# Patient Record
Sex: MALE | Race: WHITE | NOT HISPANIC OR LATINO | Employment: OTHER | URBAN - METROPOLITAN AREA
[De-identification: names, ages, dates, MRNs, and addresses within clinical notes are randomized per-mention and may not be internally consistent; named-entity substitution may affect disease eponyms.]

---

## 2017-01-28 DIAGNOSIS — I10 BENIGN ESSENTIAL HTN: ICD-10-CM

## 2017-01-30 RX ORDER — LOSARTAN POTASSIUM 50 MG/1
TABLET ORAL
Qty: 90 TABLET | Refills: 3 | Status: SHIPPED | OUTPATIENT
Start: 2017-01-30 | End: 2017-05-16 | Stop reason: SDUPTHER

## 2017-02-21 ENCOUNTER — OFFICE VISIT (OUTPATIENT)
Dept: INTERNAL MEDICINE | Facility: CLINIC | Age: 82
End: 2017-02-21
Payer: MEDICARE

## 2017-02-21 ENCOUNTER — TELEPHONE (OUTPATIENT)
Dept: PRIMARY CARE CLINIC | Facility: CLINIC | Age: 82
End: 2017-02-21

## 2017-02-21 VITALS
SYSTOLIC BLOOD PRESSURE: 141 MMHG | TEMPERATURE: 98 F | HEIGHT: 71 IN | BODY MASS INDEX: 31.02 KG/M2 | OXYGEN SATURATION: 95 % | WEIGHT: 221.56 LBS | DIASTOLIC BLOOD PRESSURE: 81 MMHG | HEART RATE: 83 BPM

## 2017-02-21 DIAGNOSIS — I50.22 CHRONIC SYSTOLIC CHF (CONGESTIVE HEART FAILURE): ICD-10-CM

## 2017-02-21 DIAGNOSIS — J45.40 ASTHMA IN ADULT, MODERATE PERSISTENT, UNCOMPLICATED: Primary | ICD-10-CM

## 2017-02-21 PROCEDURE — 99214 OFFICE O/P EST MOD 30 MIN: CPT | Mod: PBBFAC | Performed by: NURSE PRACTITIONER

## 2017-02-21 PROCEDURE — 99213 OFFICE O/P EST LOW 20 MIN: CPT | Mod: S$PBB,,, | Performed by: NURSE PRACTITIONER

## 2017-02-21 PROCEDURE — 99999 PR PBB SHADOW E&M-EST. PATIENT-LVL IV: CPT | Mod: PBBFAC,,, | Performed by: NURSE PRACTITIONER

## 2017-02-21 RX ORDER — LORATADINE 10 MG/1
10 TABLET ORAL DAILY
COMMUNITY
End: 2018-05-03

## 2017-02-21 NOTE — TELEPHONE ENCOUNTER
""Patient is wheezing and his grandson has the flu, his wife called. He has asthma, as of 4-5 years ago, from acid reflux. Yesterday evening he is coughing, with wheezing at night.  Taking Qvar inhaler, proair, Claritin, No Mucinex, no temperature, Offered her an appt time for her  on today.  "

## 2017-02-21 NOTE — MR AVS SNAPSHOT
[facility]  [address]  [facility] [address]  Phone: [phone]  Fax: [phone]                  Dennis Rees   2017 5:00 PM   Office Visit    Description:  Male : 1935   Provider:  Clarisse Metcalf NP   Department:  [facility]           Reason for Visit     Nasal Congestion           Diagnoses this Visit        Comments    Asthma in adult, moderate persistent, uncomplicated    -  Primary     Chronic systolic CHF (congestive heart failure)                To Do List           Future Appointments        Provider Department Dept Phone    3/16/2017 9:30 AM ROSINA Collado MD [place] [facility]mology [phone]      Goals (5 Years of Data)              Today    16    Blood Pressure < 140/90   141/81  161/101  173/85    HDL > 40           LDL CHOLESTEROL < 130             [facility]     [facility] On Call Nurse Care Line - 24/7 Assistance  Registered nurses in the [facility] On Call Center provide clinical advisement, health education, appointment booking, and other advisory services.  Call for this free service at [phone].             Medications           Message regarding Medications     Verify the changes and/or additions to your medication regime listed below are the same as discussed with your clinician today.  If any of these changes or additions are incorrect, please notify your healthcare provider.        STOP taking these medications     predniSONE (DELTASONE) 20 MG tablet Take 3 tabs daily for 3 days, then 2 tabs daily for 3 days, then 1 tabs daily for 3 days    nystatin (MYCOSTATIN) powder Apply topically 3 (three) times daily.           Verify that the below list of medications is an accurate representation of the medications you are currently taking.  If none reported, the list may be blank. If incorrect, please contact your healthcare provider. Carry this list with you in case of emergency.          "  Current Medications     albuterol 90 mcg/actuation inhaler Inhale 2 puffs into the lungs every 6 (six) hours as needed for Wheezing.    aspirin (ECOTRIN) 81 MG EC tablet Take 81 mg by mouth once daily.     atorvastatin (LIPITOR) 40 MG tablet Take 1 tablet (40 mg total) by mouth once daily.    beclomethasone (QVAR) 40 mcg/actuation Aero Inhale 2 puffs into the lungs 2 (two) times daily.    carvedilol (COREG) 3.125 MG tablet Take 1 tablet (3.125 mg total) by mouth 2 (two) times daily with meals.    diphenhydrAMINE (BENADRYL) 25 mg capsule Take 25 mg by mouth every 6 (six) hours as needed.    dutasteride (AVODART) 0.5 mg capsule Take 1 capsule (0.5 mg total) by mouth once daily.    fluticasone (FLONASE) 50 mcg/actuation nasal spray 1 spray by Each Nare route once daily.    FLUZONE HIGH-DOSE 2016-17, PF, 180 mcg/0.5 mL Syrg Use as directed    loratadine (CLARITIN) 10 mg tablet Take 10 mg by mouth once daily.    losartan (COZAAR) 50 MG tablet Take 1 tablet (50 mg total) by mouth once daily.    omeprazole (PRILOSEC) 40 MG capsule Take 1 capsule (40 mg total) by mouth 2 (two) times daily before meals.           Clinical Reference Information           Your Vitals Were     BP Pulse Temp Height Weight SpO2    141/81 83 97.8 °F (36.6 °C) (Oral) 5' 11" (1.803 m) 100.5 kg (221 lb 9 oz) 95%    BMI                30.9 kg/m2          Blood Pressure          Most Recent Value    BP  (!)  141/81      Allergies as of 2/21/2017     Pcn [Penicillins]    Sulfa (Sulfonamide Antibiotics)      Immunizations Administered on Date of Encounter - 2/21/2017     None      Language Assistance Services     ATTENTION: Language assistance services are available, free of charge. Please call [phone].      ATENCIÓN: Si habla milaisaiah, tiene a small disposición servicios gratuitos de asistencia lingüística. Llame al [phone].     KOKO Ý: N?u b?n nói Ti?ng Vi?t, có các d?ch v? h? tr? ngôn ng? mi?n phí dành cho b?n. G?i s? [phone].      "    [facility] complies with applicable Federal civil rights laws and does not discriminate on the basis of race, color, national origin, age, disability, or sex.

## 2017-02-21 NOTE — TELEPHONE ENCOUNTER
"Aliya,  Thanks. I see he has an appt today at 5PM  Agree the best thing is same day UC  hsarri    ====  "Patient is wheezing and his grandson has the flu, his wife called. He has asthma, as of 4-5 years ago, from acid reflux. Yesterday evening he is coughing, with wheezing at night. Taking Qvar inhaler, proair, Claritin, No Mucinex, no temperature, Offered her an appt time for her  on today.      "

## 2017-02-22 NOTE — PROGRESS NOTES
Subjective:       Patient ID: Dennis Rees is a 82 y.o. male.    Chief Complaint: Nasal Congestion (cough)  Mr. Rees presents today for cold like symptoms and night time wheezing.  He is accompanied by his wife, who gave most of the history.  Mrs. Rees reports that Mr. Rees has had a cold for 3 day and yesterday his cough got quite bad.  Today, he is doing much better, but they came in to be checked out.   URI    This is a new problem. The current episode started in the past 7 days. The problem has been gradually improving. There has been no fever. Associated symptoms include congestion, coughing, rhinorrhea, a sore throat and wheezing. Pertinent negatives include no abdominal pain, chest pain, diarrhea, dysuria, ear pain, headaches, joint swelling, nausea, plugged ear sensation, rash, sinus pain, sneezing, swollen glands or vomiting. He has tried antihistamine (restarted QVAR Saturday, once daily, increased to BID on Monday. ) for the symptoms. The treatment provided significant relief.     Review of Systems   Constitutional: Negative for fever.   HENT: Positive for congestion, rhinorrhea and sore throat. Negative for ear pain and sneezing.    Eyes: Negative for visual disturbance.   Respiratory: Positive for cough and wheezing.    Cardiovascular: Negative for chest pain.   Gastrointestinal: Negative for abdominal pain, diarrhea, nausea and vomiting.   Genitourinary: Negative for dysuria.   Musculoskeletal: Negative for myalgias.   Skin: Negative for rash.   Neurological: Negative for headaches.   Psychiatric/Behavioral: Negative for confusion.       Objective:      Physical Exam   Constitutional: He appears well-developed and well-nourished. No distress.   HENT:   Head: Atraumatic.   Right Ear: Tympanic membrane and ear canal normal.   Left Ear: Tympanic membrane and ear canal normal.   Nose: Mucosal edema and rhinorrhea present. No sinus tenderness. Right sinus exhibits no maxillary sinus tenderness and no  frontal sinus tenderness. Left sinus exhibits no maxillary sinus tenderness and no frontal sinus tenderness.   Mouth/Throat: Oropharyngeal exudate present. No posterior oropharyngeal edema, posterior oropharyngeal erythema or tonsillar abscesses.   Eyes: No scleral icterus.   Neck: Neck supple. No hepatojugular reflux and no JVD present.   Cardiovascular: Normal rate, regular rhythm and normal heart sounds.    Abdominal: Soft. Bowel sounds are normal. He exhibits no distension and no ascites.   Musculoskeletal:   Slow, shuffling gait.  Mild, non-pitting dependent edema   Lymphadenopathy:     He has no cervical adenopathy.   Neurological: He is alert.   Skin: Skin is warm and dry. He is not diaphoretic.   Psychiatric: His behavior is normal.   Nursing note and vitals reviewed.      Assessment:       1. Asthma in adult, moderate persistent, uncomplicated    2. Chronic systolic CHF (congestive heart failure)        Plan:   1. Asthma in adult, moderate persistent, uncomplicated  - Continue QVAR BUD, flonase, and zyrtec for at least 1 week     2. Chronic systolic CHF (congestive heart failure)  - No evidence of acute exaccerbation      Pt has been given instructions populated from [employer] database and has verbalized understanding of the after visit summary and information contained wherein.    Follow up with a primary care provider. May go to ER for acute shortness of breath, lightheadedness, fever, or any other emergent complaints or changes in condition.

## 2017-03-30 ENCOUNTER — OFFICE VISIT (OUTPATIENT)
Dept: OPHTHALMOLOGY | Facility: CLINIC | Age: 82
End: 2017-03-30
Payer: MEDICARE

## 2017-03-30 DIAGNOSIS — H34.8110 CENTRAL RETINAL VEIN OCCLUSION, RIGHT EYE, WITH MACULAR EDEMA: Primary | ICD-10-CM

## 2017-03-30 DIAGNOSIS — H35.033 HYPERTENSIVE RETINOPATHY OF BOTH EYES: ICD-10-CM

## 2017-03-30 DIAGNOSIS — H35.351 CYSTOID MACULAR EDEMA, RIGHT EYE: ICD-10-CM

## 2017-03-30 PROCEDURE — 92226 PR SPECIAL EYE EXAM, SUBSEQUENT: CPT | Mod: 50,S$PBB,, | Performed by: OPHTHALMOLOGY

## 2017-03-30 PROCEDURE — 99999 PR PBB SHADOW E&M-EST. PATIENT-LVL III: CPT | Mod: PBBFAC,,, | Performed by: OPHTHALMOLOGY

## 2017-03-30 PROCEDURE — 92014 COMPRE OPH EXAM EST PT 1/>: CPT | Mod: S$PBB,,, | Performed by: OPHTHALMOLOGY

## 2017-03-30 PROCEDURE — 99213 OFFICE O/P EST LOW 20 MIN: CPT | Mod: PBBFAC,PO | Performed by: OPHTHALMOLOGY

## 2017-03-30 PROCEDURE — 92226 PR SPECIAL EYE EXAM, SUBSEQUENT: CPT | Mod: 50,PBBFAC,PO | Performed by: OPHTHALMOLOGY

## 2017-03-30 PROCEDURE — 92134 CPTRZ OPH DX IMG PST SGM RTA: CPT | Mod: PBBFAC,PO | Performed by: OPHTHALMOLOGY

## 2017-03-30 NOTE — PROGRESS NOTES
Prior FP - OD - swollen ONH with heme  OS - ONH normal - with small cup    OCT - CME OD with VMT - minimal  OS No ME      A/P    1. CRVO OD with CME  VMT component  S/p Avastin OD x 11    12/15 - increase fluid at 8 weeks - resumed 5-6 week tx    8/16 - no CME, try observation  11/16 only small CME - continue to watch    2.. AION OD  HTN with disc at risk  May get some vision improvement as disc swelling subsides, but pt aware of poor prognosis      3. HTN Ret OU  BP control    4. NS OU  Pt to see Dr. Baez for MRx update, if NI, then send for CE      12 months OCT

## 2017-03-30 NOTE — MR AVS SNAPSHOT
[place] [facility]   [facility]  [place] [address]  Phone: [phone]  Fax: [phone]                  Dennis Rees   3/30/2017 10:30 AM   Office Visit    Description:  Male : 1935   Provider:  ROSINA Collado MD   Department:  [place] [facility]           Reason for Visit     Concerns About Ocular Health           Diagnoses this Visit        Comments    Central retinal vein occlusion, right eye, with macular edema    -  Primary     Cystoid macular edema, right eye         Hypertensive retinopathy of both eyes                To Do List           Goals (5 Years of Data)              16    Blood Pressure < 140/90   141/81  141/81  141/81    HDL > 40           LDL CHOLESTEROL < 130             Follow-Up and Disposition     Return in about 1 year (around 3/30/2018).      [facility] On Call     [facility] On Call Nurse Care Line -  Assistance  Unless otherwise directed by your provider, please contact [facility], our nurse care line that is available for  assistance.     Registered nurses in the [facility] On Call Center provide: appointment scheduling, clinical advisement, health education, and other advisory services.  Call: [phone] (toll free)               Medications           Message regarding Medications     Verify the changes and/or additions to your medication regime listed below are the same as discussed with your clinician today.  If any of these changes or additions are incorrect, please notify your healthcare provider.             Verify that the below list of medications is an accurate representation of the medications you are currently taking.  If none reported, the list may be blank. If incorrect, please contact your healthcare provider. Carry this list with you in case of emergency.           Current Medications     albuterol 90 mcg/actuation inhaler Inhale 2 puffs into the lungs every 6 (six) hours as needed  for Wheezing.    aspirin (ECOTRIN) 81 MG EC tablet Take 81 mg by mouth once daily.     atorvastatin (LIPITOR) 40 MG tablet Take 1 tablet (40 mg total) by mouth once daily.    beclomethasone (QVAR) 40 mcg/actuation Aero Inhale 2 puffs into the lungs 2 (two) times daily.    carvedilol (COREG) 3.125 MG tablet Take 1 tablet (3.125 mg total) by mouth 2 (two) times daily with meals.    dutasteride (AVODART) 0.5 mg capsule Take 1 capsule (0.5 mg total) by mouth once daily.    fluticasone (FLONASE) 50 mcg/actuation nasal spray 1 spray by Each Nare route once daily.    FLUZONE HIGH-DOSE 2016-17, PF, 180 mcg/0.5 mL Syrg Use as directed    loratadine (CLARITIN) 10 mg tablet Take 10 mg by mouth once daily.    losartan (COZAAR) 50 MG tablet Take 1 tablet (50 mg total) by mouth once daily.    omeprazole (PRILOSEC) 40 MG capsule Take 1 capsule (40 mg total) by mouth 2 (two) times daily before meals.    diphenhydrAMINE (BENADRYL) 25 mg capsule Take 25 mg by mouth every 6 (six) hours as needed.           Clinical Reference Information           Allergies as of 3/30/2017     Pcn [Penicillins]    Sulfa (Sulfonamide Antibiotics)      Immunizations Administered on Date of Encounter - 3/30/2017     None      Orders Placed During Today's Visit      Normal Orders This Visit    Posterior Segment OCT Retina-Both eyes       Language Assistance Services     ATTENTION: Language assistance services are available, free of charge. Please call [phone].      ATENCIÓN: Si habla milaañol, tiene a small disposición servicios gratuitos de asistencia lingüística. Llame al [phone].     [facility] Ý: N?u b?n nói Ti?ng Vi?t, có các d?ch v? h? tr? ngôn ng? mi?n phí dành cho b?n. G?i s? [phone].         [facility] complies with applicable Federal civil rights laws and does not discriminate on the basis of race, color, national origin, age, disability, or sex.

## 2017-03-31 ENCOUNTER — PATIENT MESSAGE (OUTPATIENT)
Dept: RESEARCH | Facility: HOSPITAL | Age: 82
End: 2017-03-31

## 2017-04-19 ENCOUNTER — OFFICE VISIT (OUTPATIENT)
Dept: OPTOMETRY | Facility: CLINIC | Age: 82
End: 2017-04-19
Payer: MEDICARE

## 2017-04-19 DIAGNOSIS — H35.351 CYSTOID MACULAR EDEMA, RIGHT EYE: ICD-10-CM

## 2017-04-19 DIAGNOSIS — H52.203 HYPEROPIA WITH ASTIGMATISM, BILATERAL: ICD-10-CM

## 2017-04-19 DIAGNOSIS — H52.4 PRESBYOPIA OU: ICD-10-CM

## 2017-04-19 DIAGNOSIS — H52.03 HYPEROPIA WITH ASTIGMATISM, BILATERAL: ICD-10-CM

## 2017-04-19 DIAGNOSIS — H25.13 NUCLEAR SCLEROSIS, BILATERAL: Primary | ICD-10-CM

## 2017-04-19 DIAGNOSIS — H34.8112 CENTRAL RETINAL VEIN OCCLUSION, RIGHT EYE: ICD-10-CM

## 2017-04-19 DIAGNOSIS — H53.8 BLURRED VISION, BILATERAL: ICD-10-CM

## 2017-04-19 PROCEDURE — 92014 COMPRE OPH EXAM EST PT 1/>: CPT | Mod: S$PBB,,, | Performed by: OPTOMETRIST

## 2017-04-19 PROCEDURE — 92015 DETERMINE REFRACTIVE STATE: CPT | Mod: ,,, | Performed by: OPTOMETRIST

## 2017-04-19 PROCEDURE — 99213 OFFICE O/P EST LOW 20 MIN: CPT | Mod: PBBFAC | Performed by: OPTOMETRIST

## 2017-04-19 PROCEDURE — 99999 PR PBB SHADOW E&M-EST. PATIENT-LVL III: CPT | Mod: PBBFAC,,, | Performed by: OPTOMETRIST

## 2017-04-19 NOTE — MR AVS SNAPSHOT
[facility]  [address]  [facility] [address]  Phone: [phone]  Fax: [phone]                  Dennis Rees   2017 2:30 PM   Office Visit    Description:  Male : 1935   Provider:  Jose Antonio Baez OD   Department:  [facility]           Reason for Visit     Concerns About Ocular Health                To Do List           Future Appointments        Provider Department Dept Phone    2017 3:00 PM Mandeep Ag MD [facility] - Cardiology [phone]    2017 11:20 AM Fiona Aguirre MD [facility] - Urology 4th Floor [phone]      Goals (5 Years of Data)              16    Blood Pressure < 140/90   141/81  141/81  141/81    HDL > 40           LDL CHOLESTEROL < 130             [facility]     [facility] On Call Nurse Care Line -  Assistance  Unless otherwise directed by your provider, please contact [facility], our nurse care line that is available for  assistance.     Registered nurses in the [facility] On Call Center provide: appointment scheduling, clinical advisement, health education, and other advisory services.  Call: [phone] (toll free)               Medications           Message regarding Medications     Verify the changes and/or additions to your medication regime listed below are the same as discussed with your clinician today.  If any of these changes or additions are incorrect, please notify your healthcare provider.             Verify that the below list of medications is an accurate representation of the medications you are currently taking.  If none reported, the list may be blank. If incorrect, please contact your healthcare provider. Carry this list with you in case of emergency.           Current Medications     albuterol 90 mcg/actuation inhaler Inhale 2 puffs into the lungs every 6 (six) hours as needed for Wheezing.    aspirin (ECOTRIN) 81 MG EC tablet Take 81 mg by mouth once  daily.     atorvastatin (LIPITOR) 40 MG tablet Take 1 tablet (40 mg total) by mouth once daily.    beclomethasone (QVAR) 40 mcg/actuation Aero Inhale 2 puffs into the lungs 2 (two) times daily.    carvedilol (COREG) 3.125 MG tablet Take 1 tablet (3.125 mg total) by mouth 2 (two) times daily with meals.    diphenhydrAMINE (BENADRYL) 25 mg capsule Take 25 mg by mouth every 6 (six) hours as needed.    dutasteride (AVODART) 0.5 mg capsule Take 1 capsule (0.5 mg total) by mouth once daily.    fluticasone (FLONASE) 50 mcg/actuation nasal spray 1 spray by Each Nare route once daily.    FLUZONE HIGH-DOSE 2016-17, PF, 180 mcg/0.5 mL Syrg Use as directed    loratadine (CLARITIN) 10 mg tablet Take 10 mg by mouth once daily.    losartan (COZAAR) 50 MG tablet Take 1 tablet (50 mg total) by mouth once daily.    omeprazole (PRILOSEC) 40 MG capsule Take 1 capsule (40 mg total) by mouth 2 (two) times daily before meals.           Clinical Reference Information           Allergies as of 4/19/2017     Pcn [Penicillins]    Sulfa (Sulfonamide Antibiotics)      Immunizations Administered on Date of Encounter - 4/19/2017     None      Instructions    History of central retinal vein occlusion in the right eye, with s/p multiple intravitreal injections in the right eye.  Followed by Dr. Collado in retina clinic.  Last visit with Dr. Collado on 03/31/2017.  Bilateral nuclear sclerosis.  No compelling need for cataract surgery in either eye at this time.  Monitor.  Hyperopia with astigmatism in each eye.  Minimal change (0.25 D) in refractive error in the right eye, and no change in refractive error in the left eye.  Presbyopia consistent with age.  New spectacle lens Rx issued for use as desired.  Follow up with Dr. Collado in retina clinic.   Recheck refraction and status of cataract development in one year - or prior if notes any problems or changes in [facility] in the interim.        Language Assistance Services     ATTENTION: Language  assistance services are available, free of charge. Please call [phone].      ATENCIÓN: Si habla shanna, tiene a small disposición servicios gratuitos de asistencia lingüística. Llame al [phone].     CHÚ Ý: N?u b?n nói Ti?ng Vi?t, có các d?ch v? h? tr? ngôn ng? mi?n phí dành cho b?n. G?i s? [phone].         [facility] complies with applicable Federal civil rights laws and does not discriminate on the basis of race, color, national origin, age, disability, or sex.

## 2017-04-19 NOTE — PROGRESS NOTES
"HPI     Concerns About Ocular Health    Additional comments: Eye exam            Comments   Approximate date of last eye examination: 11/30/2015  Name of last eye doctor seen:   Dr. Collado for follow up on central vein   occlusion in right eye, with macular edema.     Last visit with Dr. Baez in 11/2015  Wears glasses? yes     If yes, wears full-time or part-time?  Full time    Present glasses are bifocal / S V Distance / S V Reading:  progressives  Approximate age of present glasses:  3 yrs old   Got new glasses following last exam, or subsequently?:  no   Any problem with [facility] with glasses?  no  Wears CLs?:  no  Headaches? no  Eye pain/discomfort?  no                                                                                       Flashes?  no  Floaters?  no  Diplopia/Double vision?  no  Patient's Ocular History:         Any eye surgeries?  no         Any eye injury?  no         Any treatment for eye disease?  no  Family history of eye disease?  none  Significant patient medical history:         1. Diabetes?  No                   If yes, IDDM or NIDDM?  n/a   2. HBP?  yes              3. Other (describe):  Heart attack, high cholesterol, not sure   if he still has blockages   ! OTC eyedrops currently using:  no   ! Prescription eye meds currently using:  no   ! Any history of allergy/adverse reaction to any eye meds used   previously?  no    ! Any history of allergy/adverse reaction to eyedrops used during prior   eye exam(s)? no    ! Any history of allergy/adverse reaction to Novacaine or similar meds?   no   ! Any history of allergy/adverse reaction to Epinephrine or similar meds?   no    ! Patient okay with use of anesthetic eyedrops to check eye pressure? yes            ! Patient okay with use of eyedrops to dilate pupils today? yes    !  Allergies/Medications/Medical History/Family History reviewed today?    yes    PD =   61/57  Desired reading distance =  17.75"                                      "                               Last edited by Jose Antonio Baez, OD on 4/19/2017  3:30 PM. (History)            Assessment /Plan     For exam results, see Encounter Report.    1. Nuclear sclerosis, bilateral     2. Central retinal vein occlusion, right eye     3. Cystoid macular edema, right eye     4. Blurred vision, bilateral     5. Hyperopia with astigmatism, bilateral     6. Presbyopia OU                History of central retinal vein occlusion in the right eye, with s/p multiple intravitreal injections in the right eye.  Followed by Dr. Collado in retina clinic.  Last visit with Dr. Collado on 03/31/2017.  Bilateral nuclear sclerosis.  No compelling need for cataract surgery in either eye at this time.  Monitor.  Hyperopia with astigmatism in each eye.  Minimal change (0.25 D) in refractive error in the right eye, and no change in refractive error in the left eye.  Presbyopia consistent with age.  New spectacle lens Rx issued for use as desired.  Follow up with Dr. Collado in retina clinic.   Recheck refraction and status of cataract development in one year - or prior if notes any problems or changes in [facility] in the interim.

## 2017-04-19 NOTE — PATIENT INSTRUCTIONS
History of central retinal vein occlusion in the right eye, with s/p multiple intravitreal injections in the right eye.  Followed by Dr. Collado in retina clinic.  Last visit with Dr. Collado on 03/31/2017.  Bilateral nuclear sclerosis.  No compelling need for cataract surgery in either eye at this time.  Monitor.  Hyperopia with astigmatism in each eye.  Minimal change (0.25 D) in refractive error in the right eye, and no change in refractive error in the left eye.  Presbyopia consistent with age.  New spectacle lens Rx issued for use as desired.  Follow up with Dr. Collado in retina clinic.   Recheck refraction and status of cataract development in one year - or prior if notes any problems or changes in [facility] in the interim.

## 2017-04-21 DIAGNOSIS — N40.0 BENIGN NON-NODULAR PROSTATIC HYPERPLASIA WITHOUT LOWER URINARY TRACT SYMPTOMS: ICD-10-CM
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
## 2017-04-24 RX ORDER — DUTASTERIDE 0.5 MG/1
CAPSULE, LIQUID FILLED ORAL
Qty: 30 CAPSULE | Refills: 8 | Status: SHIPPED | OUTPATIENT
Start: 2017-04-24 | End: 2017-05-17 | Stop reason: SDUPTHER

## 2017-05-16 ENCOUNTER — OFFICE VISIT (OUTPATIENT)
Dept: CARDIOLOGY | Facility: CLINIC | Age: 82
End: 2017-05-16
Payer: MEDICARE

## 2017-05-16 VITALS
HEART RATE: 68 BPM | BODY MASS INDEX: 30.37 KG/M2 | WEIGHT: 216.94 LBS | SYSTOLIC BLOOD PRESSURE: 158 MMHG | DIASTOLIC BLOOD PRESSURE: 72 MMHG | HEIGHT: 71 IN

## 2017-05-16 DIAGNOSIS — I10 BENIGN ESSENTIAL HTN: ICD-10-CM

## 2017-05-16 DIAGNOSIS — J30.89 ENVIRONMENTAL AND SEASONAL ALLERGIES: ICD-10-CM

## 2017-05-16 DIAGNOSIS — N04.5 KIDNEY DISEASE (NEPHROTIC SYNDROME WITH MEMBRANOPROLIFERATIVE GLOMERULONEPHRITIS): ICD-10-CM

## 2017-05-16 DIAGNOSIS — E78.5 HYPERLIPIDEMIA, UNSPECIFIED HYPERLIPIDEMIA TYPE: ICD-10-CM

## 2017-05-16 DIAGNOSIS — I95.1 DELAYED ORTHOSTATIC HYPOTENSION: ICD-10-CM

## 2017-05-16 DIAGNOSIS — K21.9 GASTROESOPHAGEAL REFLUX DISEASE, ESOPHAGITIS PRESENCE NOT SPECIFIED: ICD-10-CM

## 2017-05-16 DIAGNOSIS — I25.10 CORONARY ARTERY DISEASE INVOLVING NATIVE CORONARY ARTERY OF NATIVE HEART WITHOUT ANGINA PECTORIS: Primary | ICD-10-CM

## 2017-05-16 DIAGNOSIS — J45.901 ASTHMA EXACERBATION: ICD-10-CM

## 2017-05-16 DIAGNOSIS — J45.40 ASTHMA IN ADULT, MODERATE PERSISTENT, UNCOMPLICATED: ICD-10-CM

## 2017-05-16 PROCEDURE — 99213 OFFICE O/P EST LOW 20 MIN: CPT | Mod: PBBFAC | Performed by: INTERNAL MEDICINE

## 2017-05-16 PROCEDURE — 99214 OFFICE O/P EST MOD 30 MIN: CPT | Mod: S$PBB,,, | Performed by: INTERNAL MEDICINE

## 2017-05-16 PROCEDURE — 99999 PR PBB SHADOW E&M-EST. PATIENT-LVL III: CPT | Mod: PBBFAC,,, | Performed by: INTERNAL MEDICINE

## 2017-05-16 RX ORDER — ATORVASTATIN CALCIUM 40 MG/1
40 TABLET, FILM COATED ORAL DAILY
Qty: 90 TABLET | Refills: 3 | Status: SHIPPED | OUTPATIENT
Start: 2017-05-16 | End: 2018-06-21 | Stop reason: SDUPTHER

## 2017-05-16 RX ORDER — OMEPRAZOLE 40 MG/1
40 CAPSULE, DELAYED RELEASE ORAL
Qty: 180 CAPSULE | Refills: 3 | Status: SHIPPED | OUTPATIENT
Start: 2017-05-16 | End: 2018-06-21 | Stop reason: SDUPTHER

## 2017-05-16 RX ORDER — FLUTICASONE PROPIONATE 50 MCG
1 SPRAY, SUSPENSION (ML) NASAL DAILY
Qty: 16 G | Refills: 5 | Status: SHIPPED | OUTPATIENT
Start: 2017-05-16 | End: 2018-06-21 | Stop reason: SDUPTHER

## 2017-05-16 RX ORDER — LOSARTAN POTASSIUM 50 MG/1
50 TABLET ORAL DAILY
Qty: 90 TABLET | Refills: 3 | Status: SHIPPED | OUTPATIENT
Start: 2017-05-16 | End: 2018-06-19 | Stop reason: SDUPTHER

## 2017-05-16 RX ORDER — CARVEDILOL 3.12 MG/1
TABLET ORAL
Qty: 180 TABLET | Refills: 3 | Status: SHIPPED | OUTPATIENT
Start: 2017-05-16 | End: 2018-06-21 | Stop reason: SDUPTHER

## 2017-05-16 RX ORDER — ALBUTEROL SULFATE 90 UG/1
2 AEROSOL, METERED RESPIRATORY (INHALATION) EVERY 6 HOURS PRN
Qty: 8.5 G | Refills: 12 | Status: SHIPPED | OUTPATIENT
Start: 2017-05-16 | End: 2018-06-01 | Stop reason: SDUPTHER

## 2017-05-16 NOTE — MR AVS SNAPSHOT
[facility] - Cardiology  [address]  [facility] [address]  Phone: [phone]                  Dennis Rees   2017 3:00 PM   Office Visit    Description:  Male : 1935   Provider:  Mandeep Ag MD   Department:  [facility]           Reason for Visit     Benign non-nodular prostatic hyperplasia without lower urina     Medication Refill     Chest Pain           Diagnoses this Visit        Comments    Coronary artery disease involving native coronary artery of native heart without angina pectoris    -  Primary     Asthma in adult, moderate persistent, uncomplicated         Asthma exacerbation         Hyperlipidemia, unspecified hyperlipidemia type         Delayed orthostatic hypotension         Environmental and seasonal allergies         Benign essential HTN         Gastroesophageal reflux disease, esophagitis presence not specified         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    Follow-up and Disposition History       These Medications        Disp Refills Start End    albuterol 90 mcg/actuation inhaler 8.5 g 12 2017     Inhale 2 puffs into the lungs every 6 (six) hours as needed for Wheezing. - Inhalation    Pharmacy: [facility] [address] [place] [address] [place] - [facility] [address] [facility] [address] Ph #: [phone]       atorvastatin (LIPITOR) 40 MG tablet 90  tablet 3 5/16/2017     Take 1 tablet (40 mg total) by mouth once daily. - Oral    Pharmacy: [address] [place] [address] [address] AT [facility] Ph #: [phone]       beclomethasone (QVAR) 40 mcg/actuation Aero 8.7 each 3 5/16/2017     Inhale 2 puffs into the lungs 2 (two) times daily. - Inhalation    Pharmacy: [facility] [employer] [address] [facility] [address] AT [facility] Ph #: [phone]       carvedilol (COREG) 3.125 MG tablet 180 tablet 3 5/16/2017     Take 1 tablet (3.125 mg total) by mouth 2 (two) times daily with meals.    Pharmacy: [facility] [employer] [address] [place] [address] [address] AT [facility] Ph #: [phone]       Notes to Pharmacy: This prescription was filled today(10/31/2016). Any refills authorized will be placed on file.    fluticasone (FLONASE) 50 mcg/actuation nasal spray 16 g 5 5/16/2017     1 spray by Each Nare route once daily. - Each Nare    Pharmacy: [facility] [employer] [address] [place] [facility] [address] AT [facility] Ph #: [phone]       losartan (COZAAR) 50 MG tablet 90 tablet 3 5/16/2017     Take 1 tablet (50 mg total) by mouth once daily. - Oral    Pharmacy: [facility] [employer] [address] [place] [address] [address] AT [facility] Ph #: [phone]       Notes to Pharmacy: This prescription was filled today(1/28/2017). Any refills authorized will be placed on file.    omeprazole (PRILOSEC) 40 MG capsule 180 capsule 3 5/16/2017 5/16/2018    Take 1 capsule (40 mg total) by mouth 2 (two) times daily before meals. - Oral    Pharmacy: [facility] [employer] [address] [address] [facility] [address] AT [facility] Ph #: [phone]       Notes to Pharmacy: This prescription was filled today. Any refills authorized will be placed on file.      [facility] On Call     [facility] Nurse Care Line - 24/7  Assistance  Unless otherwise directed by your provider, please contact [facility], our nurse care line that is available for 24/7 assistance.     Registered nurses in the [facility] provide: appointment scheduling, clinical advisement, health education, and other advisory services.  Call: [phone] (toll free)               Medications           Message regarding Medications     Verify the changes and/or additions to your medication regime listed below are the same as discussed with your clinician today.  If any of these changes or additions are incorrect, please notify your healthcare provider.        CHANGE how you are taking these medications     Start Taking Instead of    losartan (COZAAR) 50 MG tablet losartan (COZAAR) 50 MG tablet    Dosage:  Take 1 tablet (50 mg total) by mouth once daily. Dosage:  Take 1 tablet (50 mg total) by mouth once daily.    Reason for Change:  Reorder            Verify that the below list of medications is an accurate representation of the medications you are currently taking.  If none reported, the list may be blank. If incorrect, please contact your healthcare provider. Carry this list with you in case of emergency.           Current Medications     albuterol 90 mcg/actuation inhaler Inhale 2 puffs into the lungs every 6 (six) hours as needed for Wheezing.    aspirin (ECOTRIN) 81 MG EC tablet Take 81 mg by mouth once daily.     atorvastatin (LIPITOR) 40 MG tablet Take 1 tablet (40 mg total) by mouth once daily.    beclomethasone (QVAR) 40 mcg/actuation Aero Inhale 2 puffs into the lungs 2 (two) times daily.    carvedilol (COREG) 3.125 MG tablet Take 1 tablet (3.125 mg total) by mouth 2 (two) times daily with meals.    diphenhydrAMINE (BENADRYL) 25 mg capsule Take 25 mg by mouth every 6 (six) hours as needed.    dutasteride (AVODART) 0.5 mg capsule Take 1 capsule (0.5 mg total) by mouth once daily.    fluticasone (FLONASE) 50 mcg/actuation nasal spray 1 spray  "by Each Nare route once daily.    FLUZONE HIGH-DOSE 2016-17, PF, 180 mcg/0.5 mL Syrg Use as directed    loratadine (CLARITIN) 10 mg tablet Take 10 mg by mouth once daily.    losartan (COZAAR) 50 MG tablet Take 1 tablet (50 mg total) by mouth once daily.    omeprazole (PRILOSEC) 40 MG capsule Take 1 capsule (40 mg total) by mouth 2 (two) times daily before meals.           Clinical Reference Information           Your Vitals Were     BP Pulse Height Weight BMI    158/72 (BP Location: Left arm, Patient Position: Sitting, BP Method: Automatic) 68 5' 11" (1.803 m) 98.4 kg (216 lb 14.9 oz) 30.26 kg/m2      Blood Pressure          Most Recent Value    Right Arm BP - Sitting  143/62    Left Arm BP - Sitting  158/72    BP  (!)  158/72      Allergies as of 5/16/2017     Pcn [Penicillins]    Sulfa (Sulfonamide Antibiotics)      Immunizations Administered on Date of Encounter - 5/16/2017     None      Orders Placed During Today's Visit     Future Labs/Procedures Expected by Expires    CBC auto differential  5/16/2017 (Approximate) 5/16/2018    Comprehensive metabolic panel  5/16/2017 (Approximate) 5/16/2018    Lipid panel  5/16/2017 (Approximate) 5/16/2018      Language Assistance Services     ATTENTION: Language assistance services are available, free of charge. Please call [phone].      ATENCIÓN: Si habla español, tiene a small disposición servicios gratuitos de asistencia lingüística. Llame al [phone].     KOKO Ý: N?u b?n nói Ti?ng Vi?t, có các d?ch v? h? tr? ngôn ng? mi?n phí dành cho b?n. G?i s? [phone].         [facility] complies with applicable Federal civil rights laws and does not discriminate on the basis of race, color, national origin, age, disability, or sex.        "

## 2017-05-16 NOTE — PROGRESS NOTES
Subjective:    Patient ID:  Dennis Rees is a 82 y.o. male who presents for follow-up of CAD, hypertension and hyperlipidemia    HPI  82 year old male is followed with non critical CAD, hypertension , hyperlipidemia and orthostatic light headedness. His speech is slow and halting. He denies chest pain or increased SALDANA. He reports occasional palpitation. A prior Holter revealed frequent PACs. He has a slow gait is slow according  to his wife. 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 nose and back (NMSC), excised by     Squamous cell carcinoma 11/2014    right mid cheek, MOHS    Squamous cell carcinoma 11/2014    left mid cheek, MOHS     Current Outpatient Prescriptions   Medication Sig    albuterol 90 mcg/actuation inhaler Inhale 2 puffs into the lungs every 6 (six) hours as needed for Wheezing.    aspirin (ECOTRIN) 81 MG EC tablet Take 81 mg by mouth once daily.     atorvastatin (LIPITOR) 40 MG tablet Take 1 tablet (40 mg total) by mouth once daily.    beclomethasone (QVAR) 40 mcg/actuation Aero Inhale 2 puffs into the lungs 2 (two) times daily.    carvedilol (COREG) 3.125 MG tablet Take 1 tablet (3.125 mg total) by mouth 2 (two) times daily with meals.    diphenhydrAMINE (BENADRYL) 25 mg  capsule Take 25 mg by mouth every 6 (six) hours as needed.    dutasteride (AVODART) 0.5 mg capsule Take 1 capsule (0.5 mg total) by mouth once daily.    fluticasone (FLONASE) 50 mcg/actuation nasal spray 1 spray by Each Nare route once daily.    FLUZONE HIGH-DOSE 2016-17, PF, 180 mcg/0.5 mL Syrg Use as directed    loratadine (CLARITIN) 10 mg tablet Take 10 mg by mouth once daily.    losartan (COZAAR) 50 MG tablet Take 1 tablet (50 mg total) by mouth once daily.    omeprazole (PRILOSEC) 40 MG capsule Take 1 capsule (40 mg total) by mouth 2 (two) times daily before meals.     No current facility-administered medications for this visit.                Review of Systems   Constitution: Negative for decreased appetite, diaphoresis, fever, weakness, malaise/fatigue, weight gain and weight loss.   HENT: Negative for congestion, ear discharge, ear pain, headaches and nosebleeds.    Eyes: Negative for blurred vision, double vision and visual disturbance.   Cardiovascular: Positive for palpitations. Negative for chest pain, claudication, cyanosis, dyspnea on exertion, irregular heartbeat, leg swelling, near-syncope, orthopnea, paroxysmal nocturnal dyspnea and syncope.   Respiratory: Negative for cough, hemoptysis, shortness of breath, sleep disturbances due to breathing, snoring, sputum production and wheezing.    Endocrine: Negative for polydipsia, polyphagia and polyuria.   Hematologic/Lymphatic: Negative for adenopathy and bleeding problem. Does not bruise/bleed easily.   Skin: Negative for color change, nail changes, poor wound healing and rash.   Musculoskeletal: Negative for muscle cramps and muscle weakness.   Gastrointestinal: Negative for abdominal pain, anorexia, change in bowel habit, hematochezia, nausea and vomiting.   Genitourinary: Negative for dysuria, frequency and hematuria.   Neurological: Negative for brief paralysis, difficulty with concentration, excessive daytime sleepiness, dizziness, focal  "weakness, light-headedness, seizures and vertigo.        Bradykinesis   Psychiatric/Behavioral: Negative for altered mental status and depression.   Allergic/Immunologic: Negative for persistent infections.        Objective:BP (!) 158/72 (BP Location: Left arm, Patient Position: Sitting, BP Method: Automatic)  Pulse 68  Ht 5' 11" (1.803 m)  Wt 98.4 kg (216 lb 14.9 oz)  BMI 30.26 kg/m2    Physical Exam   Constitutional: He is oriented to person, place, and time. He appears well-developed and well-nourished.   HENT:   Head: Normocephalic.   Right Ear: External ear normal.   Left Ear: External ear normal.   Nose: Nose normal.   Inspection of lips, teeth and gums normal   Eyes: EOM are normal. Pupils are equal, round, and reactive to light. No scleral icterus.   Neck: Normal range of motion. Neck supple. No JVD present. No tracheal deviation present. No thyromegaly present.   Cardiovascular: Normal rate, regular rhythm and intact distal pulses.  Exam reveals no gallop and no friction rub.    No murmur heard.  Pulses:       Dorsalis pedis pulses are 2+ on the right side, and 2+ on the left side.        Posterior tibial pulses are 2+ on the right side, and 2+ on the left side.   Pulmonary/Chest: Effort normal and breath sounds normal.   Abdominal: Bowel sounds are normal. He exhibits no distension. There is no hepatosplenomegaly. There is no tenderness. There is no guarding.   Musculoskeletal: Normal range of motion. He exhibits no edema or tenderness.   Lymphadenopathy:   Palpation of neck and groin lymph nodes normal   Neurological: He is alert and oriented to person, place, and time. No cranial nerve deficit. He exhibits normal muscle tone. Coordination normal.   bradykinesis   Skin: Skin is dry.   Palpation of skin normal   Psychiatric: His behavior is normal. Judgment and thought content normal.         Assessment:       1. Coronary artery disease involving native coronary artery of native heart without angina " pectoris    2. Asthma in adult, moderate persistent, uncomplicated    3. Asthma exacerbation    4. Hyperlipidemia, unspecified hyperlipidemia type    5. Delayed orthostatic hypotension    6. Environmental and seasonal allergies    7. Benign essential HTN    8. Gastroesophageal reflux disease, esophagitis presence not specified    9. 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    Diagnoses and all orders for this visit:    Coronary artery disease involving native coronary artery of native heart without angina pectoris    Asthma in adult, moderate persistent, uncomplicated  -     albuterol 90 mcg/actuation inhaler; Inhale 2 puffs into the lungs every 6 (six) hours as needed for Wheezing.  -     beclomethasone (QVAR) 40 mcg/actuation Aero; Inhale 2 puffs into the lungs 2 (two) times daily.    Asthma exacerbation  -     albuterol 90 mcg/actuation inhaler; Inhale 2 puffs into the lungs every 6 (six) hours as needed for Wheezing.  -     beclomethasone (QVAR) 40 mcg/actuation Aero; Inhale 2 puffs into the lungs 2 (two) times daily.    Hyperlipidemia, unspecified hyperlipidemia type  -     atorvastatin (LIPITOR) 40 MG tablet; Take 1 tablet (40 mg total) by mouth once daily.    Delayed orthostatic hypotension  -     carvedilol (COREG) 3.125 MG tablet;     Environmental and seasonal allergies  -     fluticasone (FLONASE) 50 mcg/actuation nasal spray; 1 spray by Each Nare route once daily.    Benign essential HTN  -     losartan (COZAAR) 50 MG tablet; Take 1 tablet (50 mg total) by mouth once daily.    Gastroesophageal reflux disease, esophagitis presence not specified  -     omeprazole (PRILOSEC) 40 MG capsule; Take 1 capsule (40 mg total) by mouth 2 (two) times daily before meals.    Kidney disease (nephrotic syndrome with membranoproliferative  glomerulonephritis)

## 2017-05-17 ENCOUNTER — OFFICE VISIT (OUTPATIENT)
Dept: UROLOGY | Facility: CLINIC | Age: 82
End: 2017-05-17
Payer: MEDICARE

## 2017-05-17 ENCOUNTER — LAB VISIT (OUTPATIENT)
Dept: LAB | Facility: HOSPITAL | Age: 82
End: 2017-05-17
Attending: INTERNAL MEDICINE
Payer: MEDICARE

## 2017-05-17 VITALS
WEIGHT: 213 LBS | HEART RATE: 66 BPM | DIASTOLIC BLOOD PRESSURE: 85 MMHG | BODY MASS INDEX: 29.71 KG/M2 | SYSTOLIC BLOOD PRESSURE: 168 MMHG

## 2017-05-17 DIAGNOSIS — N04.5 KIDNEY DISEASE (NEPHROTIC SYNDROME WITH MEMBRANOPROLIFERATIVE GLOMERULONEPHRITIS): ICD-10-CM

## 2017-05-17 DIAGNOSIS — L28.0 LICHENOID DERMATITIS: ICD-10-CM

## 2017-05-17 DIAGNOSIS — E78.5 HYPERLIPIDEMIA, UNSPECIFIED HYPERLIPIDEMIA TYPE: ICD-10-CM

## 2017-05-17 DIAGNOSIS — I10 BENIGN ESSENTIAL HTN: ICD-10-CM

## 2017-05-17 DIAGNOSIS — N40.0 BENIGN NON-NODULAR PROSTATIC HYPERPLASIA WITHOUT LOWER URINARY TRACT SYMPTOMS: Primary | ICD-10-CM

## 2017-05-17 LAB
ALBUMIN SERPL BCP-MCNC: 3.6 G/DL
ALP SERPL-CCNC: 67 U/L
ALT SERPL W/O P-5'-P-CCNC: 12 U/L
ANION GAP SERPL CALC-SCNC: 4 MMOL/L
AST SERPL-CCNC: 16 U/L
BASOPHILS # BLD AUTO: 0.02 K/UL
BASOPHILS NFR BLD: 0.3 %
BILIRUB SERPL-MCNC: 0.8 MG/DL
BILIRUB SERPL-MCNC: NORMAL MG/DL
BLOOD, POC UA: NORMAL
BUN SERPL-MCNC: 16 MG/DL
CALCIUM SERPL-MCNC: 9.3 MG/DL
CHLORIDE SERPL-SCNC: 106 MMOL/L
CHOLEST/HDLC SERPL: 3 {RATIO}
CO2 SERPL-SCNC: 29 MMOL/L
CREAT SERPL-MCNC: 1 MG/DL
DIFFERENTIAL METHOD: ABNORMAL
EOSINOPHIL # BLD AUTO: 0.4 K/UL
EOSINOPHIL NFR BLD: 6.6 %
ERYTHROCYTE [DISTWIDTH] IN BLOOD BY AUTOMATED COUNT: 13.3 %
EST. GFR  (AFRICAN AMERICAN): >60 ML/MIN/1.73 M^2
EST. GFR  (NON AFRICAN AMERICAN): >60 ML/MIN/1.73 M^2
GLUCOSE SERPL-MCNC: 93 MG/DL
GLUCOSE UR QL STRIP: NORMAL
HCT VFR BLD AUTO: 38.9 %
HDL/CHOLESTEROL RATIO: 33.1 %
HDLC SERPL-MCNC: 127 MG/DL
HDLC SERPL-MCNC: 42 MG/DL
HGB BLD-MCNC: 14.1 G/DL
KETONES UR QL STRIP: NORMAL
LDLC SERPL CALC-MCNC: 73.2 MG/DL
LEUKOCYTE ESTERASE URINE, POC: NORMAL
LYMPHOCYTES # BLD AUTO: 2.3 K/UL
LYMPHOCYTES NFR BLD: 38.1 %
MCH RBC QN AUTO: 32.9 PG
MCHC RBC AUTO-ENTMCNC: 36.2 %
MCV RBC AUTO: 91 FL
MONOCYTES # BLD AUTO: 0.5 K/UL
MONOCYTES NFR BLD: 7.6 %
NEUTROPHILS # BLD AUTO: 2.8 K/UL
NEUTROPHILS NFR BLD: 47.2 %
NITRITE, POC UA: NORMAL
NONHDLC SERPL-MCNC: 85 MG/DL
PH, POC UA: 6
PLATELET # BLD AUTO: 154 K/UL
PMV BLD AUTO: 9.7 FL
POTASSIUM SERPL-SCNC: 4.6 MMOL/L
PROT SERPL-MCNC: 6.8 G/DL
PROTEIN, POC: NORMAL
RBC # BLD AUTO: 4.29 M/UL
SODIUM SERPL-SCNC: 139 MMOL/L
SPECIFIC GRAVITY, POC UA: 1
TRIGL SERPL-MCNC: 59 MG/DL
UROBILINOGEN, POC UA: NORMAL
WBC # BLD AUTO: 5.93 K/UL

## 2017-05-17 PROCEDURE — 99999 PR PBB SHADOW E&M-EST. PATIENT-LVL III: CPT | Mod: PBBFAC,,, | Performed by: UROLOGY

## 2017-05-17 PROCEDURE — 99213 OFFICE O/P EST LOW 20 MIN: CPT | Mod: S$PBB,,, | Performed by: UROLOGY

## 2017-05-17 PROCEDURE — 81000 URINALYSIS NONAUTO W/SCOPE: CPT | Mod: PBBFAC | Performed by: UROLOGY

## 2017-05-17 PROCEDURE — 99213 OFFICE O/P EST LOW 20 MIN: CPT | Mod: PBBFAC | Performed by: UROLOGY

## 2017-05-17 PROCEDURE — 81002 URINALYSIS NONAUTO W/O SCOPE: CPT | Mod: PBBFAC | Performed by: UROLOGY

## 2017-05-17 RX ORDER — DUTASTERIDE 0.5 MG/1
CAPSULE, LIQUID FILLED ORAL
Qty: 30 CAPSULE | Refills: 11 | Status: SHIPPED | OUTPATIENT
Start: 2017-05-17 | End: 2018-06-01 | Stop reason: SDUPTHER

## 2017-05-17 NOTE — MR AVS SNAPSHOT
[facility] - Urology 4th Floor  [address]  [facility] [address]  Phone: [phone]                  Dennis Rees   2017 11:20 AM   Office Visit    Description:  Male : 1935   Provider:  Fiona Aguirre MD   Department:  [facility] - Urology 4th Floor           Diagnoses this Visit        Comments    Benign non-nodular prostatic hyperplasia without lower urinary tract symptoms    -  Primary     Lichenoid dermatitis                To Do List           Future Appointments        Provider Department Dept Phone    7/10/2017 2:45 PM Jen Ware MD [facility] - Dermatology [phone]      Goals (5 Years of Data)              Today    17    Blood Pressure < 140/90   168/85  168/85  168/85    HDL > 40   42        LDL CHOLESTEROL < 130   73.2           These Medications        Disp Refills Start End    dutasteride (AVODART) 0.5 mg capsule 30 capsule 11 2017     Take 1 capsule (0.5 mg total) by mouth once daily.    Pharmacy: [email or web address] [address] [place] [address] [address] AT [facility] [place] [place] [facility] [address] Ph #: [phone]         [facility]     [facility] On Call Nurse Care Line -  Assistance  Unless otherwise directed by your provider, please contact [facility], our nurse care line that is available for  assistance.     Registered nurses in the [facility] provide: appointment scheduling, clinical advisement, health education, and other advisory services.  Call: [phone] (toll free)               Medications           Message regarding Medications     Verify the changes and/or additions to your medication regime listed below are the same as discussed with your clinician today.  If any of these changes or additions are incorrect, please notify your healthcare provider.             Verify that the below list of medications is an accurate representation of the medications you are currently taking.  If none reported,  the list may be blank. If incorrect, please contact your healthcare provider. Carry this list with you in case of emergency.           Current Medications     albuterol 90 mcg/actuation inhaler Inhale 2 puffs into the lungs every 6 (six) hours as needed for Wheezing.    aspirin (ECOTRIN) 81 MG EC tablet Take 81 mg by mouth once daily.     atorvastatin (LIPITOR) 40 MG tablet Take 1 tablet (40 mg total) by mouth once daily.    beclomethasone (QVAR) 40 mcg/actuation Aero Inhale 2 puffs into the lungs 2 (two) times daily.    carvedilol (COREG) 3.125 MG tablet Take 1 tablet (3.125 mg total) by mouth 2 (two) times daily with meals.    diphenhydrAMINE (BENADRYL) 25 mg capsule Take 25 mg by mouth every 6 (six) hours as needed.    dutasteride (AVODART) 0.5 mg capsule Take 1 capsule (0.5 mg total) by mouth once daily.    fluticasone (FLONASE) 50 mcg/actuation nasal spray 1 spray by Each Nare route once daily.    FLUZONE HIGH-DOSE 2016-17, PF, 180 mcg/0.5 mL Syrg Use as directed    loratadine (CLARITIN) 10 mg tablet Take 10 mg by mouth once daily.    losartan (COZAAR) 50 MG tablet Take 1 tablet (50 mg total) by mouth once daily.    omeprazole (PRILOSEC) 40 MG capsule Take 1 capsule (40 mg total) by mouth 2 (two) times daily before meals.           Clinical Reference Information           Your Vitals Were     BP Pulse Weight BMI       168/85 66 96.6 kg (213 lb) 29.71 kg/m2       Blood Pressure          Most Recent Value    BP  (!)  168/85      Allergies as of 5/17/2017     Pcn [Penicillins]    Sulfa (Sulfonamide Antibiotics)      Immunizations Administered on Date of Encounter - 5/17/2017     None      Language Assistance Services     ATTENTION: Language assistance services are available, free of charge. Please call [phone].      ATENCIÓN: Si habla milaañol, tiene a small disposición servicios gratuitos de asistencia lingüística. Llame al [phone].     CHÚ Ý: N?u b?n nói Ti?ng Vi?t, có các d?ch v? h? tr? ngjose a ng? mi?n  [facility] dành cho b?n. G?i s? [phone].         [facility] 4th Floor complies with applicable Federal civil rights laws and does not discriminate on the basis of race, color, national origin, age, disability, or sex.

## 2017-05-17 NOTE — PROGRESS NOTES
CHIEF COMPLAINT:    Mr. Rees is a 82 y.o. male presenting for a follow up on penile lesion.      PRESENTING ILLNESS:    Dennis Rees is a 82 y.o. male who returns for follow up.  He states he is doing well, however, he is bothered by the appearance of the penile lesion.  He saw his dermatologist but since it was not bothering him from discomfort she recommended observation.  With regards to how he urinates, he has nocturia x 1-2 most nights.  He feels like he empties to completion and has not had any UTIs.  He is accompanied by his wife who provides much of the history as the patient has increased word searching.     REVIEW OF SYSTEMS:    Review of Systems   Constitutional: Negative.    HENT: Negative.    Eyes: Negative.    Respiratory: Negative.    Cardiovascular: Negative.    Gastrointestinal: Negative for constipation.   Genitourinary:        Nocturia   Musculoskeletal: Negative.    Skin: Negative.    Neurological:        Increased word searching   Endo/Heme/Allergies: Negative.    Psychiatric/Behavioral: Positive for memory loss.     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 nose and back (NMSC), excised by     Squamous cell carcinoma 11/2014    right mid cheek, MOHS    Squamous cell carcinoma 11/2014     left mid cheek, MOHS       Past Surgical History:   Procedure Laterality Date    CARDIAC CATHETERIZATION  12/1/11    non obstructive    CIRCUMCISION, PRIMARY      HERNIA REPAIR      TONSILLECTOMY, ADENOIDECTOMY      TOTAL KNEE ARTHROPLASTY         Family History   Problem Relation Age of Onset    Stroke Mother     Diabetes Maternal Aunt      Social History    Marital status:      Social History Main Topics    Smoking status: Never Smoker    Smokeless tobacco: Never Used    Alcohol use No    Drug use: Not on file    Sexual activity: Not on file       Allergies:  Pcn [penicillins] and Sulfa (sulfonamide antibiotics)    Medications:  Outpatient Encounter Prescriptions as of 5/17/2017   Medication Sig Dispense Refill    albuterol 90 mcg/actuation inhaler Inhale 2 puffs into the lungs every 6 (six) hours as needed for Wheezing. 8.5 g 12    aspirin (ECOTRIN) 81 MG EC tablet Take 81 mg by mouth once daily.       atorvastatin (LIPITOR) 40 MG tablet Take 1 tablet (40 mg total) by mouth once daily. 90 tablet 3    beclomethasone (QVAR) 40 mcg/actuation Aero Inhale 2 puffs into the lungs 2 (two) times daily. 8.7 each 3    carvedilol (COREG) 3.125 MG tablet Take 1 tablet (3.125 mg total) by mouth 2 (two) times daily with meals. 180 tablet 3    diphenhydrAMINE (BENADRYL) 25 mg capsule Take 25 mg by mouth every 6 (six) hours as needed.      dutasteride (AVODART) 0.5 mg capsule Take 1 capsule (0.5 mg total) by mouth once daily. 30 capsule 11    fluticasone (FLONASE) 50 mcg/actuation nasal spray 1 spray by Each Nare route once daily. 16 g 5    FLUZONE HIGH-DOSE 2016-17, PF, 180 mcg/0.5 mL Syrg Use as directed  0    loratadine (CLARITIN) 10 mg tablet Take 10 mg by mouth once daily.      losartan (COZAAR) 50 MG tablet Take 1 tablet (50 mg total) by mouth once daily. 90 tablet 3    omeprazole (PRILOSEC) 40 MG capsule Take 1 capsule (40 mg total) by mouth 2 (two) times daily before meals. 180 capsule 3     [DISCONTINUED] dutasteride (AVODART) 0.5 mg capsule Take 1 capsule (0.5 mg total) by mouth once daily. 30 capsule 8     No facility-administered encounter medications on file as of 5/17/2017.          PHYSICAL EXAMINATION:    The patient generally appears in good health, is appropriately interactive, and is in no apparent distress.    Skin: No lesions.    Mental: Cooperative with normal affect.    Neuro: Grossly intact.    HEENT: Normal. No evidence of lymphadenopathy.    Chest: normal inspiratory effort.    Extremities: No clubbing, cyanosis, or edema    Phallus is circumcised, there is an area of erythema, dry at the 2 o'clock position of the glans, just above the coronal sulcus.    LABS:    UA 1.005, pH 5, otherwise, negative    IMPRESSION:    Encounter Diagnoses   Name Primary?    Benign non-nodular prostatic hyperplasia without lower urinary tract symptoms Yes    Lichenoid dermatitis        PLAN:    1. Spoke at length about the fact that the lesion is not infectious nor is it a neoplastic process.  I will defer to Dr. Ware for further therapy.  He expresses that the aspect that bothers him the most about it is the red appearance of the lesion on the penis.  Discussed that it is OK to use a corn starch based powder if he would like.    2.  Refilled the dutesteride.    3.  Follow up in 6 months

## 2017-07-10 ENCOUNTER — OFFICE VISIT (OUTPATIENT)
Dept: DERMATOLOGY | Facility: CLINIC | Age: 82
End: 2017-07-10
Payer: MEDICARE

## 2017-07-10 DIAGNOSIS — D18.00 ANGIOMA: ICD-10-CM

## 2017-07-10 DIAGNOSIS — L81.4 LENTIGINES: ICD-10-CM

## 2017-07-10 DIAGNOSIS — Z12.83 SKIN CANCER SCREENING: ICD-10-CM

## 2017-07-10 DIAGNOSIS — L57.0 AK (ACTINIC KERATOSIS): Primary | ICD-10-CM

## 2017-07-10 DIAGNOSIS — L82.1 SK (SEBORRHEIC KERATOSIS): ICD-10-CM

## 2017-07-10 PROCEDURE — 99212 OFFICE O/P EST SF 10 MIN: CPT | Mod: PBBFAC | Performed by: DERMATOLOGY

## 2017-07-10 PROCEDURE — 1159F MED LIST DOCD IN RCRD: CPT | Mod: ,,, | Performed by: DERMATOLOGY

## 2017-07-10 PROCEDURE — 99999 PR PBB SHADOW E&M-EST. PATIENT-LVL II: CPT | Mod: PBBFAC,,, | Performed by: DERMATOLOGY

## 2017-07-10 PROCEDURE — 17003 DESTRUCT PREMALG LES 2-14: CPT | Mod: PBBFAC | Performed by: DERMATOLOGY

## 2017-07-10 PROCEDURE — 17000 DESTRUCT PREMALG LESION: CPT | Mod: S$PBB,,, | Performed by: DERMATOLOGY

## 2017-07-10 PROCEDURE — 17000 DESTRUCT PREMALG LESION: CPT | Mod: PBBFAC | Performed by: DERMATOLOGY

## 2017-07-10 PROCEDURE — 17003 DESTRUCT PREMALG LES 2-14: CPT | Mod: S$PBB,,, | Performed by: DERMATOLOGY

## 2017-07-10 PROCEDURE — 99213 OFFICE O/P EST LOW 20 MIN: CPT | Mod: 25,S$PBB,, | Performed by: DERMATOLOGY

## 2017-07-10 NOTE — PROGRESS NOTES
Subjective:       Patient ID:  Dennis Rees is a 82 y.o. male who presents for   Chief Complaint   Patient presents with    Skin Check     UBSE     HPI  Pt has a hx of recurrent erythema on his glans penis which was biopsied by urology (11/14/16).  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  Has been prescribed many creams for this, including mycolog, lotrisone, clotrimazole, and nystatin powder.  Pt currently states it is asymptomatic and doesn't bother him at all. No similar lesions elsewhere on skin. No new medications prior to onset of rash. Not currently interested in any more treatment for it.     Interested in upper body skin check today.  Patient has a history of non-melanoma skin cancer and AK's treated with cryo and PDT.  He has noticed a few scaly, tender areas on the face and hands since last visit. No prior tx.    Review of Systems   Constitutional: Negative for fever, chills, weight loss, weight gain, fatigue, night sweats and malaise.   Skin: Negative for sun sensitivity and recent sunburn.   Hematologic/Lymphatic: Bruises/bleeds easily.        Objective:    Physical Exam   Constitutional: He appears well-developed and well-nourished. No distress.   Neurological: He is alert and oriented to person, place, and time. He is not disoriented.   Psychiatric: He has a normal mood and affect.   Skin:   Areas Examined (abnormalities noted in diagram):   Head / Face Inspection Performed  Neck Inspection Performed  Chest / Axilla Inspection Performed  Abdomen Inspection Performed  Genitals / Buttocks / Groin Inspection Performed  Back Inspection Performed  RUE Inspected  LUE Inspection Performed  RLE Inspected                   Diagram Legend     Erythematous scaling macule/papule c/w actinic keratosis       Vascular papule c/w angioma      Pigmented verrucoid papule/plaque c/w seborrheic keratosis      Yellow umbilicated papule c/w sebaceous hyperplasia      Irregularly shaped tan macule c/w  lentigo     1-2 mm smooth white papules consistent with Milia      Movable subcutaneous cyst with punctum c/w epidermal inclusion cyst      Subcutaneous movable cyst c/w pilar cyst      Firm pink to brown papule c/w dermatofibroma      Pedunculated fleshy papule(s) c/w skin tag(s)      Evenly pigmented macule c/w junctional nevus     Mildly variegated pigmented, slightly irregular-bordered macule c/w mildly atypical nevus      Flesh colored to evenly pigmented papule c/w intradermal nevus       Pink pearly papule/plaque c/w basal cell carcinoma      Erythematous hyperkeratotic cursted plaque c/w SCC      Surgical scar with no sign of skin cancer recurrence      Open and closed comedones      Inflammatory papules and pustules      Verrucoid papule consistent consistent with wart     Erythematous eczematous patches and plaques     Dystrophic onycholytic nail with subungual debris c/w onychomycosis     Umbilicated papule    Erythematous-base heme-crusted tan verrucoid plaque consistent with inflamed seborrheic keratosis     Erythematous Silvery Scaling Plaque c/w Psoriasis     See annotation      Assessment / Plan:        AK (actinic keratosis)  Cryosurgery Procedure Note    Verbal consent from the patient is obtained and the patient is aware of the precancerous quality and need for treatment of these lesions. Liquid nitrogen cryosurgery is applied to the 4 actinic keratoses, as detailed in the physical exam, to produce a freeze injury. The patient is aware that blisters may form and is instructed on wound care with gentle cleansing and use of vaseline ointment to keep moist until healed. The patient is supplied a handout on cryosurgery and is instructed to call if lesions do not completely resolve.    SK (seborrheic keratosis)  These are benign inherited growths without a malignant potential. Reassurance given to patient. No treatment is necessary.   Treatment of benign, asymptomatic lesions may be considered  cosmetic.  Warned about risk of hypo- or hyperpigmentation with treatment and risk of recurrence.    Lentigines  These are benign sun spots which should be monitored for changes. Patient instructed in importance of daily broad spectrum sunscreen use with spf at least 30. Sun avoidance and topical protection/protective clothing discussed.    Angioma  This is a benign vascular lesion. Reassurance given. No treatment required. Treatment of benign, asymptomatic lesions may be considered cosmetic.    Skin cancer screening  Upper body skin examination performed today including at least 6 points as noted in physical examination. No lesions suspicious for malignancy noted.  Patient instructed in importance of daily broad spectrum sunscreen use with spf at least 30. Sun avoidance and topical protection/protective clothing discussed.    Return in about 6 months (around 1/10/2018).

## 2017-07-10 NOTE — PATIENT INSTRUCTIONS
CRYOSURGERY      Your doctor has used a method called cryosurgery to treat your skin condition. Cryosurgery refers to the use of very cold substances to treat a variety of skin conditions such as warts, pre-skin cancers, molluscum contagiosum, sun spots, and several benign growths. The substance we use in cryosurgery is liquid nitrogen and is so cold (-195 degrees Celsius) that is burns when administered.     Following treatment in the office, the skin may immediately burn and become red. You may find the area around the lesion is affected as well. It is sometimes necessary to treat not only the lesion, but a small area of the surrounding normal skin to achieve a good response.     A blister, and even a blood filled blister, may form after treatment.   This is a normal response. If the blister is painful, it is acceptable to sterilize a needle and with rubbing alcohol and gently pop the blister. It is important that you gently wash the area with soap and warm water as the blister fluid may contain wart virus if a wart was treated. Do no remove the roof of the blister.     The area treated can take anywhere from 1-3 weeks to heal. Healing time depends on the kind skin lesion treated, the location, and how aggressively the lesion was treated. It is recommended that the areas treated are covered with Vaseline or bacitracin ointment and a band-aid. If a band-aid is not practical, just ointment applied several times per day will do. Keeping these areas moist will speed the healing time.    Treatment with liquid nitrogen can leave a scar. In dark skin, it may be a light or dark scar, in light skin it may be a white or pink scar. These will generally fade with time, but may never go away completely.     If you have any concerns after your treatment, please feel free to call the office.       [address] [facility]/[place] [address]/ [phone] FAX/ [email or web address]    SEBORRHEIC KERATOSES        What  causes seborrheic keratoses?    Seborrheic keratoses are harmless, common skin growths that first appear during adult life.  As time goes by, more growths appear.  Some persons have a very large number of them.  Seborrheic keratoses appear on both covered and uncovered parts of the body; they are not caused by sunlight.  The tendency to develop seborrheic keratoses is inherited.    Seborrheic keratoses are harmless and never become malignant.  They begin as slightly raised, light brown spots.  Gradually they thicken and take on a rough wartlike surface.  They slowly darken and may turn black.  These color changes are harmless.  Seborrheic keratoses are superficial and look as if they were stuck on the skin.  Persons who have had several seborrheic keratoses can usually recognize this type of benign growth.  However, if you are concerned or unsure about any growth, consult me.    Treatment    Seborrheic keratoses can easily be removed in the office.  The only reason for removing a seborrheic keratosis is your wish to get rid of it.

## 2017-07-20 ENCOUNTER — OFFICE VISIT (OUTPATIENT)
Dept: UROLOGY | Facility: CLINIC | Age: 82
End: 2017-07-20
Payer: MEDICARE

## 2017-07-20 VITALS
DIASTOLIC BLOOD PRESSURE: 88 MMHG | WEIGHT: 212.94 LBS | BODY MASS INDEX: 29.7 KG/M2 | HEART RATE: 67 BPM | SYSTOLIC BLOOD PRESSURE: 114 MMHG

## 2017-07-20 DIAGNOSIS — R39.198 DIFFICULTY URINATING: ICD-10-CM

## 2017-07-20 DIAGNOSIS — R35.0 FREQUENCY OF URINATION: ICD-10-CM

## 2017-07-20 DIAGNOSIS — R33.9 INCOMPLETE BLADDER EMPTYING: ICD-10-CM

## 2017-07-20 DIAGNOSIS — R39.15 URGENCY OF URINATION: ICD-10-CM

## 2017-07-20 DIAGNOSIS — N39.490 OVERFLOW INCONTINENCE OF URINE: ICD-10-CM

## 2017-07-20 PROCEDURE — 99213 OFFICE O/P EST LOW 20 MIN: CPT | Mod: PBBFAC | Performed by: PHYSICIAN ASSISTANT

## 2017-07-20 PROCEDURE — 51702 INSERT TEMP BLADDER CATH: CPT | Mod: S$PBB,,, | Performed by: PHYSICIAN ASSISTANT

## 2017-07-20 PROCEDURE — 51702 INSERT TEMP BLADDER CATH: CPT | Mod: PBBFAC | Performed by: PHYSICIAN ASSISTANT

## 2017-07-20 PROCEDURE — 99999 PR PBB SHADOW E&M-EST. PATIENT-LVL III: CPT | Mod: PBBFAC,,, | Performed by: PHYSICIAN ASSISTANT

## 2017-07-20 PROCEDURE — 99215 OFFICE O/P EST HI 40 MIN: CPT | Mod: S$PBB,25,, | Performed by: PHYSICIAN ASSISTANT

## 2017-07-20 PROCEDURE — 1159F MED LIST DOCD IN RCRD: CPT | Mod: ,,, | Performed by: PHYSICIAN ASSISTANT

## 2017-07-20 RX ORDER — CLOBETASOL PROPIONATE 0.5 MG/G
CREAM TOPICAL 2 TIMES DAILY
Qty: 15 G | Refills: 0 | Status: SHIPPED | OUTPATIENT
Start: 2017-07-20 | End: 2017-09-11 | Stop reason: SDUPTHER

## 2017-07-20 RX ORDER — LIDOCAINE HYDROCHLORIDE 10 MG/ML
1 INJECTION INFILTRATION; PERINEURAL
Status: DISCONTINUED | OUTPATIENT
Start: 2017-07-20 | End: 2018-06-01

## 2017-07-20 NOTE — PROGRESS NOTES
"CHIEF COMPLAINT:    Mr. Rees is a 82 y.o. male presenting for urinary frequency.   PRESENTING ILLNESS:    Dennis Rees is a 82 y.o. male with a PMH of penile lesion who presents for urinary frequency q 40 minutes.  The wife provides most of the imaging.   He has been experiencing "urine leaking".  He is now having to wear depends x 3 days.  His urine stream is weak.  On 7/15 she took him to urgent care for urinary frequency and incontinence.  He was told that he had a "borderline UTI" and was given a prescription for doxycycline.  He completes it tomorrow.  His urinary symptoms did not improve despite antibiotic treatment.  He reports difficulty urinating and urinary urgency.  He reports that his urine sprays to the side.     He denies dysuria, bladder pain/ pressure.      In the last day or two the wife states she has seen him walk out to the bathroom and turn around and go back in.   He is taking avodart.  He has a history of urinary retention.  He underwent a Laser TUR on 2/17/2012.    He has a history of a penile lesion on the glans penis.  Has been prescribed many creams for this, including mycolog, lotrisone, clotrimazole, and nystatin powder.  It was biopsied by Dr. Aguirre on 11/14/17.   FINAL PATHOLOGIC DIAGNOSIS  1. Skin, ventral aspect of the glans penis, shave biopsy:  - LICHENOID DERMATITIS (SEE COMMENT).  MICROSCOPIC DESCRIPTION: Sections show focal epidermal atrophy with orthohyperkeratosis. There are  extensive vacuolar-interface changes with dyskeratotic cells. A lichenoid lymphohistiocytic infiltrate obscuring the  dermoepidermal junction is present in the superficial dermis, along with scattered eosinophils.    He saw dermatology for the lesion shortly after his biopsy.        REVIEW OF SYSTEMS:    Constitutional: Negative for fever and chills.   HENT: Negative for hearing loss.   Eyes: Negative for visual disturbance.   Respiratory: Negative for shortness of breath.   Cardiovascular: Negative " for chest pain.   Gastrointestinal: Negative for vomiting, and constipation.   Genitourinary: Positive for urgency, frequency, incontinence, difficulty urinating  Negative for dysuria, hematuria, intermittency, hesitancy, and decreased urine volume.   Neurological: Negative for dizziness.   Hematological: Does not bruise/bleed easily.   Psychiatric/Behavioral: Positive for memory loss.       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 nose and back (NMSC), excised by     Squamous cell carcinoma 11/2014    right mid cheek, MOHS    Squamous cell carcinoma 11/2014    left mid cheek, MOHS       Past Surgical History:   Procedure Laterality Date    CARDIAC CATHETERIZATION  12/1/11    non obstructive    CIRCUMCISION, PRIMARY      HERNIA REPAIR      TONSILLECTOMY, ADENOIDECTOMY      TOTAL KNEE ARTHROPLASTY         Family History   Problem Relation Age of Onset    Stroke Mother     Diabetes Maternal Aunt     No Known Problems Father     No Known Problems Sister     No Known Problems Brother     No Known Problems Maternal Uncle     No Known Problems Paternal Aunt     No Known Problems Paternal Uncle     No Known Problems Maternal Grandmother     No Known Problems Maternal Grandfather     No Known Problems  Paternal Grandmother     No Known Problems Paternal Grandfather     No Known Problems Daughter     No Known Problems Son     Melanoma Neg Hx     Amblyopia Neg Hx     Blindness Neg Hx     Cancer Neg Hx     Cataracts Neg Hx     Glaucoma Neg Hx     Hypertension Neg Hx     Macular degeneration Neg Hx     Retinal detachment Neg Hx     Strabismus Neg Hx     Thyroid disease Neg Hx     Tremor Neg Hx     Parkinsonism Neg Hx        Social History     Social History    Marital status:      Spouse name: N/A    Number of children: N/A    Years of education: N/A     Occupational History     Other     Social History Main Topics    Smoking status: Never Smoker    Smokeless tobacco: Never Used    Alcohol use No    Drug use: Unknown    Sexual activity: Not on file     Other Topics Concern    Not on file     Social History Narrative    No narrative on file       Allergies:  Pcn [penicillins] and Sulfa (sulfonamide antibiotics)    Medications:    Current Outpatient Prescriptions:     albuterol 90 mcg/actuation inhaler, Inhale 2 puffs into the lungs every 6 (six) hours as needed for Wheezing., Disp: 8.5 g, Rfl: 12    aspirin (ECOTRIN) 81 MG EC tablet, Take 81 mg by mouth once daily. , Disp: , Rfl:     atorvastatin (LIPITOR) 40 MG tablet, Take 1 tablet (40 mg total) by mouth once daily., Disp: 90 tablet, Rfl: 3    beclomethasone (QVAR) 40 mcg/actuation Aero, Inhale 2 puffs into the lungs 2 (two) times daily., Disp: 8.7 each, Rfl: 3    carvedilol (COREG) 3.125 MG tablet, Take 1 tablet (3.125 mg total) by mouth 2 (two) times daily with meals., Disp: 180 tablet, Rfl: 3    clobetasol (TEMOVATE) 0.05 % cream, Apply topically 2 (two) times daily., Disp: 15 g, Rfl: 0    diphenhydrAMINE (BENADRYL) 25 mg capsule, Take 25 mg by mouth every 6 (six) hours as needed., Disp: , Rfl:     dutasteride (AVODART) 0.5 mg capsule, Take 1 capsule (0.5 mg total) by mouth once daily., Disp: 30 capsule, Rfl: 11     fluticasone (FLONASE) 50 mcg/actuation nasal spray, 1 spray by Each Nare route once daily., Disp: 16 g, Rfl: 5    FLUZONE HIGH-DOSE 2016-17, PF, 180 mcg/0.5 mL Syrg, Use as directed, Disp: , Rfl: 0    loratadine (CLARITIN) 10 mg tablet, Take 10 mg by mouth once daily., Disp: , Rfl:     losartan (COZAAR) 50 MG tablet, Take 1 tablet (50 mg total) by mouth once daily., Disp: 90 tablet, Rfl: 3    omeprazole (PRILOSEC) 40 MG capsule, Take 1 capsule (40 mg total) by mouth 2 (two) times daily before meals., Disp: 180 capsule, Rfl: 3    Current Facility-Administered Medications:     lidocaine HCL 10 mg/ml (1%) injection 1 mL, 1 mL, Other, 1 time in Clinic/HOD, Mary Daily PA-C    PHYSICAL EXAMINATION:    Constitutional: He appears well-developed and well-nourished.  He is in no apparent distress.    Eyes: No scleral icterus noted bilaterally.  No discharge noted bilaterally.      Cardiovascular: Normal rate.  No pitting edema noted in lower extremities bilaterally    Pulmonary/Chest: Effort normal. No respiratory distress.     Abdominal:  He exhibits no distension.  There is no CVA tenderness.     Lymphadenopathy:        Right: No supraclavicular adenopathy present.        Left: No supraclavicular adenopathy present.     Neurological: He is alert and oriented to person, place, and time.     Skin: Skin is warm and dry.     Psych: Cooperative with normal affect.    Genitourinary: The penis is circumcised. The urethral meatus is severely stenosed.      Physical Exam    Bladder scan performed by Nurse Coleman.  PVR 456ml.      LABS:    U/a: 1.015, pH 5, negative.    Lab Results   Component Value Date    PSA 1.4 05/20/2015    PSA 1.2 05/09/2014    PSA 1.29 03/20/2013       IMPRESSION:    Encounter Diagnoses   Name Primary?    Acquired stenosis of urethral meatus Yes    Frequency of urination     Incomplete bladder emptying     Overflow incontinence of urine     Urgency of urination     Difficulty urinating           PLAN:  I spent 40 minutes with the patient of which more than half was spent in direct consultation with the patient in regards to our treatment and plan.    Verbal consent was obtained.  Glans was prepped with betadine.  1cc of 1% lidocaine was injected into the glans.  The urethra meatus was opened using an 11 blade scalpel.  Minimum blood loss noted.  A 14 coude catheter was easily placed.  Procedure was done in office by Dr. Aguirre   450ml of clear yellow urine was drained from bladder.  Alcazar catheter was connected to leg bag and secured to leg.  Yellow urine noted draining into leg bag.    Discussed alcazar catheter care.   Complete doxycycline.  Continue avodart.   Will give clobetasol cream to apply to urethra meatus.  Patient will bring cream to follow up visit and be taught how to apply cream into urethra using a urethra dilator tool.      Follow up on 7/25 for alcazar catheter removal.      Mary Daily PA-C

## 2017-07-25 ENCOUNTER — OFFICE VISIT (OUTPATIENT)
Dept: UROLOGY | Facility: CLINIC | Age: 82
End: 2017-07-25
Payer: MEDICARE

## 2017-07-25 VITALS — SYSTOLIC BLOOD PRESSURE: 129 MMHG | DIASTOLIC BLOOD PRESSURE: 67 MMHG | HEART RATE: 74 BPM

## 2017-07-25 DIAGNOSIS — R33.9 URINARY RETENTION: ICD-10-CM

## 2017-07-25 DIAGNOSIS — Z46.6 ENCOUNTER FOR FOLEY CATHETER REMOVAL: Primary | ICD-10-CM

## 2017-07-25 PROCEDURE — 99999 PR PBB SHADOW E&M-EST. PATIENT-LVL III: CPT | Mod: PBBFAC,,, | Performed by: PHYSICIAN ASSISTANT

## 2017-07-25 PROCEDURE — 99213 OFFICE O/P EST LOW 20 MIN: CPT | Mod: PBBFAC | Performed by: PHYSICIAN ASSISTANT

## 2017-07-25 PROCEDURE — 1126F AMNT PAIN NOTED NONE PRSNT: CPT | Mod: ,,, | Performed by: PHYSICIAN ASSISTANT

## 2017-07-25 PROCEDURE — 1159F MED LIST DOCD IN RCRD: CPT | Mod: ,,, | Performed by: PHYSICIAN ASSISTANT

## 2017-07-25 PROCEDURE — 99213 OFFICE O/P EST LOW 20 MIN: CPT | Mod: S$PBB,,, | Performed by: PHYSICIAN ASSISTANT

## 2017-07-26 NOTE — PROGRESS NOTES
CHIEF COMPLAINT:    Mr. Rees is a 82 y.o. male presenting for catheter removal.  PRESENTING ILLNESS:    Dennis Rees is a 82 y.o. male who presents for catheter removal.  Patient was seen on 7/20 for urinary frequency and incontinence.  Bladder scan showed 456ml.   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  It was incised and a alcazar catheter was placed.  His catheter has been draining well.      He is taking avodart.  He has a history of urinary retention.  He underwent a Laser TUR on 2/17/2012.     He has a history of a penile lesion on the glans penis.  Has been prescribed many creams for this, including mycolog, lotrisone, clotrimazole, and nystatin powder.  It was biopsied by Dr. Aguirre on 11/14/17.   FINAL PATHOLOGIC DIAGNOSIS  1. Skin, ventral aspect of the glans penis, shave biopsy:  - LICHENOID DERMATITIS (SEE COMMENT).  MICROSCOPIC DESCRIPTION: Sections show focal epidermal atrophy with orthohyperkeratosis. There are  extensive vacuolar-interface changes with dyskeratotic cells. A lichenoid lymphohistiocytic infiltrate obscuring the  dermoepidermal junction is present in the superficial dermis, along with scattered eosinophils.     He saw dermatology for the lesion shortly after his biopsy.      REVIEW OF SYSTEMS:    Constitutional: Negative for fever and chills.   HENT: Negative for hearing loss.   Eyes: Negative for visual disturbance.   Respiratory: Negative for shortness of breath.   Cardiovascular: Negative for chest pain.   Gastrointestinal: Negative for nausea, vomiting, and constipation.   Genitourinary:  Positive for  incomplete bladder emptying.   Neurological: Negative for dizziness.   Hematological: Does not bruise/bleed easily.   Psychiatric/Behavioral:Positive for memory loss. .       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 nose and back (NMSC), excised by     Squamous cell carcinoma 11/2014    right mid cheek, MOHS    Squamous cell carcinoma 11/2014    left mid cheek, MOHS       Past Surgical History:   Procedure Laterality Date    CARDIAC CATHETERIZATION  12/1/11    non obstructive    CIRCUMCISION, PRIMARY      HERNIA REPAIR      TONSILLECTOMY, ADENOIDECTOMY      TOTAL KNEE ARTHROPLASTY         Family History   Problem Relation Age of Onset    Stroke Mother     Diabetes Maternal Aunt     No Known Problems Father     No Known Problems Sister     No Known Problems Brother     No Known Problems Maternal Uncle     No Known Problems Paternal Aunt     No Known Problems Paternal Uncle     No Known Problems Maternal Grandmother     No Known Problems Maternal Grandfather     No Known Problems Paternal Grandmother     No Known Problems Paternal Grandfather     No Known Problems Daughter     No Known Problems Son     Melanoma Neg Hx     Amblyopia Neg Hx     Blindness Neg Hx     Cancer Neg Hx     Cataracts Neg Hx     Glaucoma Neg Hx     Hypertension Neg Hx     Macular degeneration Neg Hx     Retinal detachment Neg Hx     Strabismus Neg Hx     Thyroid disease Neg Hx     Tremor Neg Hx     Parkinsonism Neg Hx        Social History     Social History    Marital status:      Spouse name: N/A    Number of children: N/A    Years of education:  N/A     Occupational History     Other     Social History Main Topics    Smoking status: Never Smoker    Smokeless tobacco: Never Used    Alcohol use No    Drug use: Unknown    Sexual activity: Not on file     Other Topics Concern    Not on file     Social History Narrative    No narrative on file       Allergies:  Pcn [penicillins] and Sulfa (sulfonamide antibiotics)    Medications:    Current Outpatient Prescriptions:     albuterol 90 mcg/actuation inhaler, Inhale 2 puffs into the lungs every 6 (six) hours as needed for Wheezing., Disp: 8.5 g, Rfl: 12    aspirin (ECOTRIN) 81 MG EC tablet, Take 81 mg by mouth once daily. , Disp: , Rfl:     atorvastatin (LIPITOR) 40 MG tablet, Take 1 tablet (40 mg total) by mouth once daily., Disp: 90 tablet, Rfl: 3    beclomethasone (QVAR) 40 mcg/actuation Aero, Inhale 2 puffs into the lungs 2 (two) times daily., Disp: 8.7 each, Rfl: 3    carvedilol (COREG) 3.125 MG tablet, Take 1 tablet (3.125 mg total) by mouth 2 (two) times daily with meals., Disp: 180 tablet, Rfl: 3    clobetasol (TEMOVATE) 0.05 % cream, Apply topically 2 (two) times daily., Disp: 15 g, Rfl: 0    diphenhydrAMINE (BENADRYL) 25 mg capsule, Take 25 mg by mouth every 6 (six) hours as needed., Disp: , Rfl:     dutasteride (AVODART) 0.5 mg capsule, Take 1 capsule (0.5 mg total) by mouth once daily., Disp: 30 capsule, Rfl: 11    fluticasone (FLONASE) 50 mcg/actuation nasal spray, 1 spray by Each Nare route once daily., Disp: 16 g, Rfl: 5    FLUZONE HIGH-DOSE 2016-17, PF, 180 mcg/0.5 mL Syrg, Use as directed, Disp: , Rfl: 0    loratadine (CLARITIN) 10 mg tablet, Take 10 mg by mouth once daily., Disp: , Rfl:     losartan (COZAAR) 50 MG tablet, Take 1 tablet (50 mg total) by mouth once daily., Disp: 90 tablet, Rfl: 3    omeprazole (PRILOSEC) 40 MG capsule, Take 1 capsule (40 mg total) by mouth 2 (two) times daily before meals., Disp: 180 capsule, Rfl: 3    Current Facility-Administered Medications:      lidocaine HCL 10 mg/ml (1%) injection 1 mL, 1 mL, Other, 1 time in Clinic/HOD, Mary Daily PA-C    PHYSICAL EXAMINATION:    Constitutional: He appears well-developed and well-nourished.  He is in no apparent distress.    Eyes: No scleral icterus noted bilaterally.  No discharge noted bilaterally.     Cardiovascular: Normal rate.  No pitting edema noted in lower extremities bilaterally    Pulmonary/Chest: Effort normal. No respiratory distress.     Abdominal:  He exhibits no distension.  There is no CVA tenderness.     Lymphadenopathy:        Right: No supraclavicular adenopathy present.        Left: No supraclavicular adenopathy present.     Neurological: He is alert and oriented to person, place, and time.     Skin: Skin is warm and dry.     Psych: Cooperative with normal affect.    Genitourinary: The penis is circumcised.      Physical Exam      LABS:      Lab Results   Component Value Date    PSA 1.4 05/20/2015    PSA 1.2 05/09/2014    PSA 1.29 03/20/2013       IMPRESSION:    Encounter Diagnoses   Name Primary?    Encounter for Alcazar catheter removal Yes    Urinary retention     Acquired stenosis of urethral meatus          PLAN:    10cc of sterile water was removed to deflate the balloon.  The alcazar catheter was removed by me.   Patient instructed to apply a small amount of clobetasol to the urethral dilator and insert dilator into the urethra meatus.  This is to be performed once daily indefinitely.  Patient brought in the cream today.  I demonstrated how to apply the cream to the dilator and insert into urethra meatus.  Dilator went in easily.  His wife successfully demonstrated the ability to insert dilator.    He will follow up in 1 month with pvr.        Mary Daily PA-C

## 2017-09-05 ENCOUNTER — OFFICE VISIT (OUTPATIENT)
Dept: UROLOGY | Facility: CLINIC | Age: 82
End: 2017-09-05
Payer: MEDICARE

## 2017-09-05 VITALS — HEART RATE: 63 BPM | DIASTOLIC BLOOD PRESSURE: 80 MMHG | SYSTOLIC BLOOD PRESSURE: 170 MMHG

## 2017-09-05 DIAGNOSIS — N40.1 BENIGN PROSTATIC HYPERPLASIA WITH INCOMPLETE BLADDER EMPTYING: ICD-10-CM

## 2017-09-05 DIAGNOSIS — R39.14 BENIGN PROSTATIC HYPERPLASIA WITH INCOMPLETE BLADDER EMPTYING: ICD-10-CM

## 2017-09-05 PROCEDURE — 99999 PR PBB SHADOW E&M-EST. PATIENT-LVL III: CPT | Mod: PBBFAC,,, | Performed by: PHYSICIAN ASSISTANT

## 2017-09-05 PROCEDURE — 1159F MED LIST DOCD IN RCRD: CPT | Mod: ,,, | Performed by: PHYSICIAN ASSISTANT

## 2017-09-05 PROCEDURE — 51798 US URINE CAPACITY MEASURE: CPT | Mod: PBBFAC | Performed by: PHYSICIAN ASSISTANT

## 2017-09-05 PROCEDURE — 99213 OFFICE O/P EST LOW 20 MIN: CPT | Mod: PBBFAC,25 | Performed by: PHYSICIAN ASSISTANT

## 2017-09-05 PROCEDURE — 99213 OFFICE O/P EST LOW 20 MIN: CPT | Mod: S$PBB,,, | Performed by: PHYSICIAN ASSISTANT

## 2017-09-05 PROCEDURE — 3077F SYST BP >= 140 MM HG: CPT | Mod: ,,, | Performed by: PHYSICIAN ASSISTANT

## 2017-09-05 PROCEDURE — 3079F DIAST BP 80-89 MM HG: CPT | Mod: ,,, | Performed by: PHYSICIAN ASSISTANT

## 2017-09-05 NOTE — PROGRESS NOTES
CHIEF COMPLAINT:    Mr. Rees is a 82 y.o. male presenting for urinary retention.    PRESENTING ILLNESS:    Dennis Rees is a 82 y.o. male with a PMH of penile lesion and urethra meatus stenosis who presents for urinary retention.  He denies difficulty urinating.  He reports a good stream.  He does not urinate as frequently now.  He reports nocturia x 1.      Patient was seen on 7/20 for urinary frequency and incontinence.  Bladder scan showed 456ml.   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  It was incised and a alcazar catheter was placed. His catheter was removed on 7/25.  He was instructed to apply clobetasol cream to his urethra using a urethra dilator once a day.   He has a history of urinary retention.  He underwent a Laser TUR on 2/17/2012.  He takes avodart.        He has a history of a penile lesion on the glans penis.  Has been prescribed many creams for this, including mycolog, lotrisone, clotrimazole, and nystatin powder.  It was biopsied by Dr. Aguirre on 11/14/17.   FINAL PATHOLOGIC DIAGNOSIS  1. Skin, ventral aspect of the glans penis, shave biopsy:  - LICHENOID DERMATITIS (SEE COMMENT).  MICROSCOPIC DESCRIPTION: Sections show focal epidermal atrophy with orthohyperkeratosis. There are  extensive vacuolar-interface changes with dyskeratotic cells. A lichenoid lymphohistiocytic infiltrate obscuring the  dermoepidermal junction is present in the superficial dermis, along with scattered eosinophils.     He saw dermatology for the lesion shortly after his biopsy.      REVIEW OF SYSTEMS:    Constitutional: Negative for fever and chills.   HENT: Negative for hearing loss.   Eyes: Negative for visual disturbance.   Respiratory: Negative for shortness of breath.   Cardiovascular: Negative for chest pain.   Gastrointestinal: Negative for nausea, vomiting, and constipation.   Genitourinary:  Negative for urgency, nocturia, incontinence, dysuria, hematuria, intermittency, hesitancy,  difficulty urinating, incomplete bladder emptying, and decreased urine volume.   Neurological: Negative for dizziness.   Hematological: Does not bruise/bleed easily.   Psychiatric/Behavioral: Negative for confusion.       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 nose and back (NMSC), excised by     Squamous cell carcinoma 11/2014    right mid cheek, MOHS    Squamous cell carcinoma 11/2014    left mid cheek, MOHS       Past Surgical History:   Procedure Laterality Date    CARDIAC CATHETERIZATION  12/1/11    non obstructive    CIRCUMCISION, PRIMARY      HERNIA REPAIR      TONSILLECTOMY, ADENOIDECTOMY      TOTAL KNEE ARTHROPLASTY         Family History   Problem Relation Age of Onset    Stroke Mother     Diabetes Maternal Aunt     No Known Problems Father     No Known Problems Sister     No Known Problems Brother     No Known Problems Maternal Uncle     No Known Problems Paternal Aunt     No Known Problems Paternal Uncle     No Known Problems Maternal Grandmother     No Known Problems Maternal Grandfather     No Known Problems Paternal Grandmother     No Known Problems Paternal Grandfather     No Known Problems Daughter     No Known Problems Son     Melanoma Neg Hx     Amblyopia Neg Hx      Blindness Neg Hx     Cancer Neg Hx     Cataracts Neg Hx     Glaucoma Neg Hx     Hypertension Neg Hx     Macular degeneration Neg Hx     Retinal detachment Neg Hx     Strabismus Neg Hx     Thyroid disease Neg Hx     Tremor Neg Hx     Parkinsonism Neg Hx        Social History     Social History    Marital status:      Spouse name: N/A    Number of children: N/A    Years of education: N/A     Occupational History     Other     Social History Main Topics    Smoking status: Never Smoker    Smokeless tobacco: Never Used    Alcohol use No    Drug use: Unknown    Sexual activity: Not on file     Other Topics Concern    Not on file     Social History Narrative    No narrative on file       Allergies:  Pcn [penicillins] and Sulfa (sulfonamide antibiotics)    Medications:    Current Outpatient Prescriptions:     albuterol 90 mcg/actuation inhaler, Inhale 2 puffs into the lungs every 6 (six) hours as needed for Wheezing., Disp: 8.5 g, Rfl: 12    aspirin (ECOTRIN) 81 MG EC tablet, Take 81 mg by mouth once daily. , Disp: , Rfl:     atorvastatin (LIPITOR) 40 MG tablet, Take 1 tablet (40 mg total) by mouth once daily., Disp: 90 tablet, Rfl: 3    beclomethasone (QVAR) 40 mcg/actuation Aero, Inhale 2 puffs into the lungs 2 (two) times daily., Disp: 8.7 each, Rfl: 3    carvedilol (COREG) 3.125 MG tablet, Take 1 tablet (3.125 mg total) by mouth 2 (two) times daily with meals., Disp: 180 tablet, Rfl: 3    clobetasol (TEMOVATE) 0.05 % cream, Apply topically 2 (two) times daily., Disp: 15 g, Rfl: 0    diphenhydrAMINE (BENADRYL) 25 mg capsule, Take 25 mg by mouth every 6 (six) hours as needed., Disp: , Rfl:     dutasteride (AVODART) 0.5 mg capsule, Take 1 capsule (0.5 mg total) by mouth once daily., Disp: 30 capsule, Rfl: 11    fluticasone (FLONASE) 50 mcg/actuation nasal spray, 1 spray by Each Nare route once daily., Disp: 16 g, Rfl: 5    FLUZONE HIGH-DOSE 2016-17, PF, 180 mcg/0.5 mL Syrg, Use as  directed, Disp: , Rfl: 0    loratadine (CLARITIN) 10 mg tablet, Take 10 mg by mouth once daily., Disp: , Rfl:     losartan (COZAAR) 50 MG tablet, Take 1 tablet (50 mg total) by mouth once daily., Disp: 90 tablet, Rfl: 3    omeprazole (PRILOSEC) 40 MG capsule, Take 1 capsule (40 mg total) by mouth 2 (two) times daily before meals., Disp: 180 capsule, Rfl: 3    Current Facility-Administered Medications:     lidocaine HCL 10 mg/ml (1%) injection 1 mL, 1 mL, Other, 1 time in Clinic/HOD, Mary Daily PA-C    PHYSICAL EXAMINATION:    Constitutional: He appears well-developed and well-nourished.  He is in no apparent distress.    Eyes: No scleral icterus noted bilaterally.  No discharge noted bilaterally.      Cardiovascular: Normal rate.  No pitting edema noted in lower extremities bilaterally    Pulmonary/Chest: Effort normal. No respiratory distress.     Abdominal:  He exhibits no distension.  There is no CVA tenderness.     Lymphadenopathy:        Right: No supraclavicular adenopathy present.        Left: No supraclavicular adenopathy present.     Neurological: He is alert and oriented to person, place, and time.     Skin: Skin is warm and dry.     Psych: Cooperative with normal affect.    Genitourinary: The penis is circumcised. The urethral meatus is open.     Physical Exam    Bladder scan performed by Nurse Coleman.  PVR 166ml.  LABS:    Lab Results   Component Value Date    PSA 1.4 05/20/2015    PSA 1.2 05/09/2014    PSA 1.29 03/20/2013       IMPRESSION:    Encounter Diagnoses   Name Primary?    Acquired stenosis of urethral meatus Yes    Benign prostatic hyperplasia with incomplete bladder emptying          PLAN:    Continue applying a small amount of clobetasol cream to his urethra using a urethra dilator once daily.  Continue avodart.   Patient would like to keep his 6 month appointment with Dr. Aguirre.  Will schedule follow up appointment.       Mary Daily PA-C

## 2017-09-12 RX ORDER — CLOBETASOL PROPIONATE 0.5 MG/G
CREAM TOPICAL ONCE
Qty: 30 G | Refills: 5 | Status: SHIPPED | OUTPATIENT
Start: 2017-09-12 | End: 2020-03-02

## 2017-10-27 ENCOUNTER — OFFICE VISIT (OUTPATIENT)
Dept: UROLOGY | Facility: CLINIC | Age: 82
End: 2017-10-27
Payer: MEDICARE

## 2017-10-27 VITALS
WEIGHT: 218.25 LBS | SYSTOLIC BLOOD PRESSURE: 152 MMHG | HEART RATE: 75 BPM | BODY MASS INDEX: 30.56 KG/M2 | DIASTOLIC BLOOD PRESSURE: 81 MMHG | HEIGHT: 71 IN

## 2017-10-27 DIAGNOSIS — R39.14 BENIGN PROSTATIC HYPERPLASIA WITH INCOMPLETE BLADDER EMPTYING: Primary | ICD-10-CM

## 2017-10-27 DIAGNOSIS — N40.1 BENIGN PROSTATIC HYPERPLASIA WITH INCOMPLETE BLADDER EMPTYING: Primary | ICD-10-CM

## 2017-10-27 PROCEDURE — 99213 OFFICE O/P EST LOW 20 MIN: CPT | Mod: S$PBB,,, | Performed by: UROLOGY

## 2017-10-27 PROCEDURE — 51798 US URINE CAPACITY MEASURE: CPT | Mod: PBBFAC | Performed by: UROLOGY

## 2017-10-27 PROCEDURE — 99213 OFFICE O/P EST LOW 20 MIN: CPT | Mod: PBBFAC | Performed by: UROLOGY

## 2017-10-27 PROCEDURE — 81002 URINALYSIS NONAUTO W/O SCOPE: CPT | Mod: PBBFAC | Performed by: UROLOGY

## 2017-10-27 PROCEDURE — 99999 PR PBB SHADOW E&M-EST. PATIENT-LVL III: CPT | Mod: PBBFAC,,, | Performed by: UROLOGY

## 2017-10-27 NOTE — PROGRESS NOTES
CHIEF COMPLAINT:    Mr. Rees is a 82 y.o. male presenting for a follow up on meatal stenosis    PRESENTING ILLNESS:    Dennis Rees is a 82 y.o. male who returns for follow up.  He is accompanied by his wife who gives much of the history.  He states that his stream is good.  She does the meatal dilation every night with the clobetasol cream.  She states that when he gave his sample today, the cup overflowed and he abruptly stopped urinating.  He stood a little longer and urinated a little bit more.  He has nocturia x 2    REVIEW OF SYSTEMS:    Review of Systems   Constitutional: Negative.    HENT: Negative.    Eyes: Negative.    Respiratory: Negative.    Cardiovascular: Negative.    Gastrointestinal: Negative.    Genitourinary: Positive for urgency.        Nocturia   Musculoskeletal: Positive for joint pain.   Skin: Negative.    Neurological: Negative.    Endo/Heme/Allergies: Negative.    Psychiatric/Behavioral: Positive for memory loss.     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 nose and back (NMSC), excised by     Squamous cell carcinoma 11/2014    right mid cheek, MOHS    Squamous cell carcinoma 11/2014    left mid cheek, MOHS       Past Surgical History:   Procedure Laterality Date    CARDIAC  CATHETERIZATION  12/1/11    non obstructive    CIRCUMCISION, PRIMARY      HERNIA REPAIR      TONSILLECTOMY, ADENOIDECTOMY      TOTAL KNEE ARTHROPLASTY         Family History   Problem Relation Age of Onset    Stroke Mother     Diabetes Maternal Aunt      Social History    Marital status:      Social History Main Topics    Smoking status: Never Smoker    Smokeless tobacco: Never Used    Alcohol use No    Drug use: Unknown    Sexual activity: Not on file       Allergies:  Pcn [penicillins] and Sulfa (sulfonamide antibiotics)    Medications:  Outpatient Encounter Prescriptions as of 10/27/2017   Medication Sig Dispense Refill    albuterol 90 mcg/actuation inhaler Inhale 2 puffs into the lungs every 6 (six) hours as needed for Wheezing. 8.5 g 12    aspirin (ECOTRIN) 81 MG EC tablet Take 81 mg by mouth once daily.       atorvastatin (LIPITOR) 40 MG tablet Take 1 tablet (40 mg total) by mouth once daily. 90 tablet 3    beclomethasone (QVAR) 40 mcg/actuation Aero Inhale 2 puffs into the lungs 2 (two) times daily. 8.7 each 3    carvedilol (COREG) 3.125 MG tablet Take 1 tablet (3.125 mg total) by mouth 2 (two) times daily with meals. 180 tablet 3    clobetasol (TEMOVATE) 0.05 % cream Apply topically once. Apply once a day. 30 g 5    dutasteride (AVODART) 0.5 mg capsule Take 1 capsule (0.5 mg total) by mouth once daily. 30 capsule 11    fluticasone (FLONASE) 50 mcg/actuation nasal spray 1 spray by Each Nare route once daily. 16 g 5    loratadine (CLARITIN) 10 mg tablet Take 10 mg by mouth once daily.      losartan (COZAAR) 50 MG tablet Take 1 tablet (50 mg total) by mouth once daily. 90 tablet 3    omeprazole (PRILOSEC) 40 MG capsule Take 1 capsule (40 mg total) by mouth 2 (two) times daily before meals. 180 capsule 3    diphenhydrAMINE (BENADRYL) 25 mg capsule Take 25 mg by mouth every 6 (six) hours as needed.      FLUZONE HIGH-DOSE 2016-17, PF, 180 mcg/0.5 mL Syrg Use as directed  0      Facility-Administered Encounter Medications as of 10/27/2017   Medication Dose Route Frequency Provider Last Rate Last Dose    lidocaine HCL 10 mg/ml (1%) injection 1 mL  1 mL Other 1 time in Clinic/HOD Mary Daily PA-C             PHYSICAL EXAMINATION:    The patient generally appears in good health, is appropriately interactive, and is in no apparent distress.    Skin: No lesions.    Mental: Cooperative with normal affect.    Neuro: Grossly intact.    HEENT: Normal. No evidence of lymphadenopathy.    Chest:  normal inspiratory effort.    Abdomen: Soft, non-tender. No masses or organomegaly. Bladder is not palpable. No evidence of flank discomfort. No evidence of inguinal hernia.    Extremities: No clubbing, cyanosis, or edema    Scrotum showed no rashes or lesions. Testicles showed no masses or tenderness.  Epididymis showed no masses or tenderness.  Penis was circumcised. No meatal stenosis. (looks normal) No penile discharge.  No inguinal hernias.  No inguinal lymphadenopathy.      PVR by bladder scan was 200 ml    LABS:    Lab Results   Component Value Date    BUN 16 05/17/2017    CREATININE 1.0 05/17/2017     Lab Results   Component Value Date    PSA 1.4 05/20/2015     UA 1.010, pH 6, otherwise, negative    IMPRESSION:    Encounter Diagnoses   Name Primary?    Benign prostatic hyperplasia with incomplete bladder emptying Yes    Acquired urinary meatal stenosis        PLAN:    1. Continue Avodart  2.  Follow up in 6 months

## 2017-11-09 ENCOUNTER — OFFICE VISIT (OUTPATIENT)
Dept: INTERNAL MEDICINE | Facility: CLINIC | Age: 82
End: 2017-11-09
Payer: MEDICARE

## 2017-11-09 VITALS
DIASTOLIC BLOOD PRESSURE: 76 MMHG | HEART RATE: 72 BPM | OXYGEN SATURATION: 95 % | HEIGHT: 71 IN | TEMPERATURE: 98 F | BODY MASS INDEX: 30.96 KG/M2 | WEIGHT: 221.13 LBS | SYSTOLIC BLOOD PRESSURE: 134 MMHG

## 2017-11-09 DIAGNOSIS — H61.23 BILATERAL IMPACTED CERUMEN: ICD-10-CM

## 2017-11-09 DIAGNOSIS — J45.30 MILD PERSISTENT ASTHMA IN ADULT WITHOUT COMPLICATION: Primary | ICD-10-CM

## 2017-11-09 DIAGNOSIS — H66.92 LEFT OTITIS MEDIA, UNSPECIFIED OTITIS MEDIA TYPE: ICD-10-CM

## 2017-11-09 PROCEDURE — 99214 OFFICE O/P EST MOD 30 MIN: CPT | Mod: S$PBB,,, | Performed by: NURSE PRACTITIONER

## 2017-11-09 PROCEDURE — 94640 AIRWAY INHALATION TREATMENT: CPT | Mod: PBBFAC

## 2017-11-09 PROCEDURE — 99215 OFFICE O/P EST HI 40 MIN: CPT | Mod: PBBFAC | Performed by: NURSE PRACTITIONER

## 2017-11-09 PROCEDURE — 99999 PR PBB SHADOW E&M-EST. PATIENT-LVL V: CPT | Mod: PBBFAC,,, | Performed by: NURSE PRACTITIONER

## 2017-11-09 RX ORDER — ALBUTEROL SULFATE 0.83 MG/ML
2.5 SOLUTION RESPIRATORY (INHALATION)
Status: COMPLETED | OUTPATIENT
Start: 2017-11-09 | End: 2017-11-09

## 2017-11-09 RX ORDER — CETIRIZINE HYDROCHLORIDE 10 MG/1
10 TABLET ORAL EVERY MORNING
COMMUNITY

## 2017-11-09 RX ORDER — AZITHROMYCIN 250 MG/1
TABLET, FILM COATED ORAL
Qty: 6 TABLET | Refills: 0 | Status: SHIPPED | OUTPATIENT
Start: 2017-11-09 | End: 2018-05-03

## 2017-11-09 RX ADMIN — ALBUTEROL SULFATE 2.5 MG: 2.5 SOLUTION RESPIRATORY (INHALATION) at 04:11

## 2017-11-09 NOTE — MEDICAL/APP STUDENT
Subjective:       Patient ID: Dennis Rees is a 82 y.o. male.    Chief Complaint: Nasal Congestion; Otalgia; and Sinus Problem    Pt presents with with his wife today for wheezing, rhinorrhea, productive cough x 5 days, and left ear discomfort x 3 days. He has asthma, and prior to this was using his qvar inhaler daily for maintenance, but has increased to BID since this exacerbation. He also switched from clairitin to zyrtec as his daily allergy medication. He is continuing to use flonase daily. His asthma is exacerbated during allergy season, with increased activity, and when his GERD is not well controlled. He has consistently been taking prilosec BID. His cough began 5 days ago, and has been productive of clear thick sputum almost every time he coughs for 4-5 days.       Otalgia    There is pain in the left ear. This is a new problem. The current episode started in the past 7 days. The problem occurs constantly. The problem has been unchanged. There has been no fever. Associated symptoms include coughing, diarrhea and rhinorrhea. Pertinent negatives include no ear discharge, sore throat or vomiting.   Sinus Problem   This is a recurrent problem. The current episode started in the past 7 days. The problem has been gradually improving since onset. There has been no fever. Associated symptoms include coughing and ear pain. Pertinent negatives include no chills, diaphoresis, sinus pressure or sore throat. Past treatments include oral decongestants and spray decongestants.     Review of Systems   Constitutional: Negative for chills, diaphoresis and fever.   HENT: Positive for ear pain and rhinorrhea. Negative for ear discharge, sinus pain, sinus pressure and sore throat.    Eyes: Positive for itching.        Watery itchy eyes   Respiratory: Positive for cough and wheezing. Negative for apnea.    Cardiovascular: Negative for chest pain and leg swelling.   Gastrointestinal: Positive for diarrhea. Negative for nausea  and vomiting.        Reports occasional loose BMs, not a recent change   Allergic/Immunologic: Positive for environmental allergies.       Objective:      Physical Exam   Constitutional: He appears well-developed and well-nourished. No distress.   HENT:   Head: Normocephalic and atraumatic.   Right Ear: External ear normal. A foreign body is present.   Left Ear: External ear normal. A foreign body is present. A middle ear effusion is present.   Nose: Rhinorrhea present. Right sinus exhibits no maxillary sinus tenderness and no frontal sinus tenderness. Left sinus exhibits no maxillary sinus tenderness and no frontal sinus tenderness.   Mouth/Throat: Uvula is midline. Oropharyngeal exudate present. No posterior oropharyngeal edema or tonsillar abscesses.   Large amount of impacted dry cerumen obscuring bilateral Tms  During cerumen removal, foreign body/ cotton tip of q tip was found in L ear canal, partially embedded in canal; minor blood loss from removal. After canal cleared, L TM appears cloudy  Foreign body/ cotton tip of q tip also found in R ear canal. Canal with redness/ erythema, flaking skin. After canal cleared of foreign body and cerumen, TM appears normal.    Cardiovascular: Normal rate, regular rhythm and intact distal pulses.    No murmur heard.  Pulmonary/Chest: Effort normal. No respiratory distress. He has wheezes. He has no rales.   Inspiratory and expiratory wheezing prior to albuterol neb tx.   Prior to tx, wheezing improved, coarse breath sounds noted bilaterally to auscultation.    Neurological: He is alert. Gait abnormal.   Shuffling gait, delayed speech, aphasia   Skin: Skin is warm and dry. He is not diaphoretic.       Assessment:       1. Mild persistent asthma in adult without complication    2. Left otitis media, unspecified otitis media type        Plan:       Mild persistent asthma in adult without complication  -     albuterol nebulizer solution 2.5 mg; Take 3 mLs (2.5 mg total) by  nebulization one time.    Left otitis media, unspecified otitis media type  -     azithromycin (Z-MACHELLE) 250 MG tablet; 2 tabs on day 1 then 1 tab on days 2-5  Dispense: 6 tablet; Refill: 0

## 2017-11-09 NOTE — PROGRESS NOTES
Name and   verified  initiated nebulizer albuterol treat per physicians orders patient tolerated welll

## 2017-11-10 NOTE — PROGRESS NOTES
I attest that this patient was seen and evaluated by JACKSON Scott, then presented to me. I then examined the patient independently and together we agreed on a diagnosis and treatment plan which was then presented to the patient. See her note dated today for full visit information.    Subjective:       Patient ID: Dennis Rees is a 82 y.o. male.     Chief Complaint: Nasal Congestion; Otalgia; and Sinus Problem     Pt presents with with his wife today for wheezing, rhinorrhea, productive cough x 5 days, and left ear discomfort x 3 days. He has asthma, and prior to this was using his qvar inhaler daily for maintenance, but has increased to BID since this exacerbation. He also switched from clairitin to zyrtec as his daily allergy medication. He is continuing to use flonase daily. His asthma is exacerbated during allergy season, with increased activity, and when his GERD is not well controlled. He has consistently been taking prilosec BID. His cough began 5 days ago, and has been productive of clear thick sputum almost every time he coughs for 4-5 days.      Otalgia    There is pain in the left ear. This is a new problem. The current episode started in the past 7 days. The problem occurs constantly. The problem has been unchanged. There has been no fever. Associated symptoms include coughing, diarrhea and rhinorrhea. Pertinent negatives include no ear discharge, sore throat or vomiting.   Sinus Problem   This is a recurrent problem. The current episode started in the past 7 days. The problem has been gradually improving since onset. There has been no fever. Associated symptoms include coughing and ear pain. Pertinent negatives include no chills, diaphoresis, sinus pressure or sore throat. Past treatments include oral decongestants and spray decongestants.      Review of Systems   Constitutional: Negative for chills, diaphoresis and fever.   HENT: Positive for ear pain and rhinorrhea. Negative for ear discharge,  sinus pain, sinus pressure and sore throat.    Eyes: Positive for itching.        Watery itchy eyes   Respiratory: Positive for cough and wheezing. Negative for apnea.    Cardiovascular: Negative for chest pain and leg swelling.   Gastrointestinal: Positive for diarrhea. Negative for nausea and vomiting.        Reports occasional loose BMs, not a recent change   Allergic/Immunologic: Positive for environmental allergies.       Objective:      Physical Exam   Constitutional: He appears well-developed and well-nourished. No distress.   HENT:   Head: Normocephalic and atraumatic.   Right Ear: External ear normal. A foreign body is present.   Left Ear: External ear normal. A foreign body is present. A middle ear effusion is present.   Nose: Rhinorrhea present. Right sinus exhibits no maxillary sinus tenderness and no frontal sinus tenderness. Left sinus exhibits no maxillary sinus tenderness and no frontal sinus tenderness.   Mouth/Throat: Uvula is midline. Oropharyngeal exudate present. No posterior oropharyngeal edema or tonsillar abscesses.   Large amount of impacted dry cerumen obscuring bilateral Tms  During cerumen removal, foreign body/ cotton tip of q tip was found in L ear canal, partially embedded in canal; minor blood loss from removal. After canal cleared, L TM appears cloudy  Foreign body/ cotton tip of q tip also found in R ear canal. Canal with redness/ erythema, flaking skin. After canal cleared of foreign body and cerumen, TM appears normal.    Cardiovascular: Normal rate, regular rhythm and intact distal pulses.    No murmur heard.  Pulmonary/Chest: Effort normal. No respiratory distress. He has wheezes. He has no rales.   Inspiratory and expiratory wheezing prior to albuterol neb tx.   Prior to tx, wheezing improved, coarse breath sounds noted bilaterally to auscultation.    Neurological: He is alert. Gait abnormal.   Shuffling gait, delayed speech, aphasia   Skin: Skin is warm and dry. He is not  diaphoretic.       Assessment:       1. Mild persistent asthma in adult without complication    2. Left otitis media, unspecified otitis media type      3. Cerumen impaction bilat  Plan:       Mild persistent asthma in adult without complication  -     albuterol nebulizer solution 2.5 mg; Take 3 mLs (2.5 mg total) by nebulization one time.     Left otitis media, unspecified otitis media type  -     azithromycin (Z-MACHELLE) 250 MG tablet; 2 tabs on day 1 then 1 tab on days 2-5  Dispense: 6 tablet; Refill: 0     Cerumen impaction, bilateral.  - cerumen removed by irrigation bilat.  + foreign bodies in both ears, removed.

## 2018-05-03 ENCOUNTER — OFFICE VISIT (OUTPATIENT)
Dept: OPHTHALMOLOGY | Facility: CLINIC | Age: 83
End: 2018-05-03
Payer: MEDICARE

## 2018-05-03 DIAGNOSIS — H35.033 HYPERTENSIVE RETINOPATHY OF BOTH EYES: ICD-10-CM

## 2018-05-03 DIAGNOSIS — H35.351 CYSTOID MACULAR EDEMA OF RIGHT EYE: ICD-10-CM

## 2018-05-03 DIAGNOSIS — H34.8110 CENTRAL RETINAL VEIN OCCLUSION WITH MACULAR EDEMA OF RIGHT EYE: Primary | ICD-10-CM

## 2018-05-03 PROCEDURE — 99212 OFFICE O/P EST SF 10 MIN: CPT | Mod: PBBFAC,PO,25 | Performed by: OPHTHALMOLOGY

## 2018-05-03 PROCEDURE — 99999 PR PBB SHADOW E&M-EST. PATIENT-LVL II: CPT | Mod: PBBFAC,,, | Performed by: OPHTHALMOLOGY

## 2018-05-03 PROCEDURE — 92014 COMPRE OPH EXAM EST PT 1/>: CPT | Mod: 25,S$PBB,, | Performed by: OPHTHALMOLOGY

## 2018-05-03 PROCEDURE — 67028 INJECTION EYE DRUG: CPT | Mod: S$PBB,RT,, | Performed by: OPHTHALMOLOGY

## 2018-05-03 PROCEDURE — 67028 INJECTION EYE DRUG: CPT | Mod: PBBFAC,PO | Performed by: OPHTHALMOLOGY

## 2018-05-03 PROCEDURE — 92134 CPTRZ OPH DX IMG PST SGM RTA: CPT | Mod: PBBFAC,PO | Performed by: OPHTHALMOLOGY

## 2018-05-03 PROCEDURE — C9257 BEVACIZUMAB INJECTION: HCPCS | Mod: PBBFAC,JG,PO | Performed by: OPHTHALMOLOGY

## 2018-05-03 RX ADMIN — BEVACIZUMAB 1.25 MG: 100 INJECTION, SOLUTION INTRAVENOUS at 11:05

## 2018-05-03 NOTE — PATIENT INSTRUCTIONS

## 2018-05-03 NOTE — PROGRESS NOTES
HPI     12 mo / OCT   DLS- 03/30/2017  Dr. Collado    Pt sts no change noticed. Denies pain   (-)Flashes (-)Floaters  (-)Photophobia  (-)Glare    No gtts       Prior FP - OD - swollen ONH with heme  OS - ONH normal - with small cup    OCT - CME OD increased  OS No ME      A/P    1. CRVO OD with CME  VMT component  S/p Avastin OD x 11    12/15 - increase fluid at 8 weeks - resumed 5-6 week tx  8/16 - no CME, try observation  11/16 only small CME - continue to watch  5/18 - increased CME    Avastin OD    2.. AION OD  HTN with disc at risk  May get some vision improvement as disc swelling subsides, but pt aware of poor prognosis      3. HTN Ret OU  BP control    4. NS OU  Consult Dr. RUSSELL      1 month OCT    Risks, benefits, and alternatives to treatment discussed in detail with the patient.  The patient voiced understanding and wished to proceed with the procedure    Injection Procedure Note:  Diagnosis: CRVO with CME OD    Topical Proparacaine and Betadine.  Inject Avastin OD at 6:00 @ 3.5-4mm posterior to limbus  Post Operative Dx: Same  Complications: None  Follow up as above.

## 2018-05-30 ENCOUNTER — OFFICE VISIT (OUTPATIENT)
Dept: OPHTHALMOLOGY | Facility: CLINIC | Age: 83
End: 2018-05-30
Payer: MEDICARE

## 2018-05-30 DIAGNOSIS — H25.13 NUCLEAR SCLEROSIS OF BOTH EYES: Primary | ICD-10-CM

## 2018-05-30 DIAGNOSIS — H52.7 REFRACTIVE ERROR: ICD-10-CM

## 2018-05-30 DIAGNOSIS — H34.8110 CENTRAL RETINAL VEIN OCCLUSION WITH MACULAR EDEMA OF RIGHT EYE: ICD-10-CM

## 2018-05-30 DIAGNOSIS — H35.033 HYPERTENSIVE RETINOPATHY OF BOTH EYES: ICD-10-CM

## 2018-05-30 PROCEDURE — 92136 OPHTHALMIC BIOMETRY: CPT | Mod: PBBFAC,PO | Performed by: OPHTHALMOLOGY

## 2018-05-30 PROCEDURE — 92014 COMPRE OPH EXAM EST PT 1/>: CPT | Mod: S$PBB,,, | Performed by: OPHTHALMOLOGY

## 2018-05-30 PROCEDURE — 99212 OFFICE O/P EST SF 10 MIN: CPT | Mod: PBBFAC,PO | Performed by: OPHTHALMOLOGY

## 2018-05-30 PROCEDURE — 99999 PR PBB SHADOW E&M-EST. PATIENT-LVL II: CPT | Mod: PBBFAC,,, | Performed by: OPHTHALMOLOGY

## 2018-05-30 RX ORDER — NEPAFENAC 3 MG/ML
1 SUSPENSION/ DROPS OPHTHALMIC DAILY
Qty: 3 ML | Refills: 1 | Status: SHIPPED | OUTPATIENT
Start: 2018-06-09 | End: 2018-07-09

## 2018-05-30 RX ORDER — OFLOXACIN 3 MG/ML
1 SOLUTION/ DROPS OPHTHALMIC 4 TIMES DAILY
Qty: 5 ML | Refills: 1 | Status: SHIPPED | OUTPATIENT
Start: 2018-06-09 | End: 2018-06-19

## 2018-05-30 RX ORDER — DIFLUPREDNATE OPHTHALMIC 0.5 MG/ML
1 EMULSION OPHTHALMIC 4 TIMES DAILY
Qty: 5 ML | Refills: 1 | Status: SHIPPED | OUTPATIENT
Start: 2018-06-12 | End: 2018-07-12

## 2018-05-30 NOTE — PROGRESS NOTES
Subjective:       Patient ID: Dennis Rees is a 83 y.o. male.    Chief Complaint: Cataract (Cataract Eval per Dr. Collado)    HPI     Cataract    Additional comments: Cataract Eval per Dr. Collado           Comments   83 y.o. Male is here today for Cataract Eval per Dr. Collado. Denies eye   pain and f/f. No noticeable VA changes since last visit. Eyes tear often.   No burning or itching. No trouble with glare.     Meds: AT's prn OU        Last edited by ELIZABETH Hightower on 5/30/2018  9:48 AM. (History)             Assessment:       1. Nuclear sclerosis of both eyes    2. Central retinal vein occlusion with macular edema of right eye    3. Hypertensive retinopathy of both eyes    4. Refractive error        Plan:       Visually significant cataract OU -Pt. Wants Sx.     CRVO OD with CME-Being treated by Dr Collado.  HTN retinopathy OU-Stable.  RE        Cataract Surgery Consent: Patient with a visually significant cataract with difficulties of ADLs, reading, driving, night vision, glare (any and all).  Discussed with Patient/Family/Caregiver: options, risks and benefits, expectations of cataract surgery, utilized an eye model with questions and answers to facilitate discussion.  Discussed lens options and patient understands that glasses may be required for optimal vision for distance and/or near vision after cataract surgery.  The Patient/Family/Caregiver  voice good understanding and patient wishes to proceed with surgery.  The patient will likely benefit from surgery and patient signed consent for Left Eye.  CE OS 6/12/18 SN60WF 22.5,        OD 6/26 18 SN60WF 24.0.  Control  HTN.

## 2018-05-31 ENCOUNTER — TELEPHONE (OUTPATIENT)
Dept: OPHTHALMOLOGY | Facility: CLINIC | Age: 83
End: 2018-05-31

## 2018-05-31 DIAGNOSIS — H25.12 NUCLEAR SCLEROSIS, LEFT: Primary | ICD-10-CM

## 2018-06-01 ENCOUNTER — OFFICE VISIT (OUTPATIENT)
Dept: UROLOGY | Facility: CLINIC | Age: 83
End: 2018-06-01
Payer: MEDICARE

## 2018-06-01 ENCOUNTER — OFFICE VISIT (OUTPATIENT)
Dept: CARDIOLOGY | Facility: CLINIC | Age: 83
End: 2018-06-01
Payer: MEDICARE

## 2018-06-01 ENCOUNTER — LAB VISIT (OUTPATIENT)
Dept: LAB | Facility: HOSPITAL | Age: 83
End: 2018-06-01
Attending: INTERNAL MEDICINE
Payer: MEDICARE

## 2018-06-01 VITALS
SYSTOLIC BLOOD PRESSURE: 151 MMHG | HEART RATE: 72 BPM | HEIGHT: 71 IN | BODY MASS INDEX: 30.37 KG/M2 | WEIGHT: 216.94 LBS | DIASTOLIC BLOOD PRESSURE: 80 MMHG

## 2018-06-01 VITALS
BODY MASS INDEX: 30.31 KG/M2 | HEART RATE: 71 BPM | WEIGHT: 216.5 LBS | DIASTOLIC BLOOD PRESSURE: 69 MMHG | HEIGHT: 71 IN | SYSTOLIC BLOOD PRESSURE: 134 MMHG

## 2018-06-01 DIAGNOSIS — R42 ORTHOSTATIC LIGHTHEADEDNESS: ICD-10-CM

## 2018-06-01 DIAGNOSIS — J45.40 ASTHMA IN ADULT, MODERATE PERSISTENT, UNCOMPLICATED: ICD-10-CM

## 2018-06-01 DIAGNOSIS — L30.9 DERMATITIS: Primary | ICD-10-CM

## 2018-06-01 DIAGNOSIS — E78.5 HYPERLIPIDEMIA, UNSPECIFIED HYPERLIPIDEMIA TYPE: ICD-10-CM

## 2018-06-01 DIAGNOSIS — I10 BENIGN ESSENTIAL HTN: ICD-10-CM

## 2018-06-01 DIAGNOSIS — N40.0 BENIGN NON-NODULAR PROSTATIC HYPERPLASIA WITHOUT LOWER URINARY TRACT SYMPTOMS: ICD-10-CM

## 2018-06-01 DIAGNOSIS — I25.10 CORONARY ARTERY DISEASE INVOLVING NATIVE CORONARY ARTERY OF NATIVE HEART WITHOUT ANGINA PECTORIS: Primary | ICD-10-CM

## 2018-06-01 DIAGNOSIS — J45.901 ASTHMA EXACERBATION: ICD-10-CM

## 2018-06-01 DIAGNOSIS — I50.22 CHRONIC SYSTOLIC CHF (CONGESTIVE HEART FAILURE): ICD-10-CM

## 2018-06-01 LAB
ALBUMIN SERPL BCP-MCNC: 3.7 G/DL
ALP SERPL-CCNC: 75 U/L
ALT SERPL W/O P-5'-P-CCNC: 14 U/L
ANION GAP SERPL CALC-SCNC: 6 MMOL/L
AST SERPL-CCNC: 17 U/L
BASOPHILS # BLD AUTO: 0.04 K/UL
BASOPHILS NFR BLD: 0.6 %
BILIRUB SERPL-MCNC: 1.3 MG/DL
BUN SERPL-MCNC: 14 MG/DL
CALCIUM SERPL-MCNC: 9.5 MG/DL
CHLORIDE SERPL-SCNC: 105 MMOL/L
CHOLEST SERPL-MCNC: 121 MG/DL
CHOLEST/HDLC SERPL: 3 {RATIO}
CO2 SERPL-SCNC: 28 MMOL/L
CREAT SERPL-MCNC: 1.3 MG/DL
DIFFERENTIAL METHOD: ABNORMAL
EOSINOPHIL # BLD AUTO: 0.3 K/UL
EOSINOPHIL NFR BLD: 4.9 %
ERYTHROCYTE [DISTWIDTH] IN BLOOD BY AUTOMATED COUNT: 12.6 %
EST. GFR  (AFRICAN AMERICAN): 58.7 ML/MIN/1.73 M^2
EST. GFR  (NON AFRICAN AMERICAN): 50.8 ML/MIN/1.73 M^2
GLUCOSE SERPL-MCNC: 96 MG/DL
HCT VFR BLD AUTO: 41.2 %
HDLC SERPL-MCNC: 40 MG/DL
HDLC SERPL: 33.1 %
HGB BLD-MCNC: 14.1 G/DL
IMM GRANULOCYTES # BLD AUTO: 0.03 K/UL
IMM GRANULOCYTES NFR BLD AUTO: 0.4 %
LDLC SERPL CALC-MCNC: 61.8 MG/DL
LYMPHOCYTES # BLD AUTO: 2.2 K/UL
LYMPHOCYTES NFR BLD: 32.2 %
MCH RBC QN AUTO: 32.9 PG
MCHC RBC AUTO-ENTMCNC: 34.2 G/DL
MCV RBC AUTO: 96 FL
MONOCYTES # BLD AUTO: 0.5 K/UL
MONOCYTES NFR BLD: 7.3 %
NEUTROPHILS # BLD AUTO: 3.8 K/UL
NEUTROPHILS NFR BLD: 54.6 %
NONHDLC SERPL-MCNC: 81 MG/DL
NRBC BLD-RTO: 0 /100 WBC
PLATELET # BLD AUTO: 151 K/UL
PMV BLD AUTO: 10 FL
POTASSIUM SERPL-SCNC: 4.5 MMOL/L
PROT SERPL-MCNC: 6.8 G/DL
RBC # BLD AUTO: 4.28 M/UL
SODIUM SERPL-SCNC: 139 MMOL/L
TRIGL SERPL-MCNC: 96 MG/DL
WBC # BLD AUTO: 6.96 K/UL

## 2018-06-01 PROCEDURE — 80053 COMPREHEN METABOLIC PANEL: CPT

## 2018-06-01 PROCEDURE — 99213 OFFICE O/P EST LOW 20 MIN: CPT | Mod: PBBFAC | Performed by: INTERNAL MEDICINE

## 2018-06-01 PROCEDURE — 99213 OFFICE O/P EST LOW 20 MIN: CPT | Mod: PBBFAC,27 | Performed by: UROLOGY

## 2018-06-01 PROCEDURE — 80061 LIPID PANEL: CPT

## 2018-06-01 PROCEDURE — 85025 COMPLETE CBC W/AUTO DIFF WBC: CPT

## 2018-06-01 PROCEDURE — 99214 OFFICE O/P EST MOD 30 MIN: CPT | Mod: S$PBB,,, | Performed by: INTERNAL MEDICINE

## 2018-06-01 PROCEDURE — 99999 PR PBB SHADOW E&M-EST. PATIENT-LVL III: CPT | Mod: PBBFAC,,, | Performed by: UROLOGY

## 2018-06-01 PROCEDURE — 99999 PR PBB SHADOW E&M-EST. PATIENT-LVL III: CPT | Mod: PBBFAC,,, | Performed by: INTERNAL MEDICINE

## 2018-06-01 PROCEDURE — 36415 COLL VENOUS BLD VENIPUNCTURE: CPT

## 2018-06-01 PROCEDURE — 99213 OFFICE O/P EST LOW 20 MIN: CPT | Mod: S$PBB,,, | Performed by: UROLOGY

## 2018-06-01 RX ORDER — DUTASTERIDE 0.5 MG/1
CAPSULE, LIQUID FILLED ORAL
Qty: 30 CAPSULE | Refills: 11 | Status: SHIPPED | OUTPATIENT
Start: 2018-06-01 | End: 2019-06-05 | Stop reason: SDUPTHER

## 2018-06-01 RX ORDER — ALBUTEROL SULFATE 90 UG/1
AEROSOL, METERED RESPIRATORY (INHALATION)
Qty: 8.5 G | Refills: 6 | Status: SHIPPED | OUTPATIENT
Start: 2018-06-01 | End: 2019-03-13 | Stop reason: SDUPTHER

## 2018-06-01 NOTE — PROGRESS NOTES
CHIEF COMPLAINT:    Mr. Rees is a 83 y.o. male presenting for a follow up on lower urinary symptoms    PRESENTING ILLNESS:    Dennis Rees is a 83 y.o. male who returns for follow up.  He is accompanied by his wife.  He states that his sister passed away within the last 2 weeks and he mourns her loss.  He has noticeable slowing of his speech and increased word searching (unclear if this is because of his state of mind.  He had 7 other sibblings, she was 3 years younger than him and he is the only one of 8 children who remains. His wife gave this history.)  He has nocturia x 2.  Continues on Avodart.  He states his penis is red today and had complained to Dr. Ag about it.  There is no associated pain or irritation.  He has seen Dr. Ware about it but was not bothered at the time.  He has undergone a shave biopsy which was consistent with Lichen planus or a lichenoid reaction.  The erythema comes and goes and is confimed to where the skin folds over the glans.  (he is circumcised but the skin folds over the base of the glans.)    He has a history of meatal stenosis and uses the clobetasol with a meatal dilator daily.  It has remained open.     REVIEW OF SYSTEMS:    Review of Systems   Constitutional: Negative.    HENT: Negative.    Eyes: Negative.    Respiratory: Negative.    Cardiovascular: Negative.    Gastrointestinal: Negative.    Genitourinary:        Nocturia   Musculoskeletal: Negative.    Skin: Negative.    Neurological: Positive for speech change (word searches).   Endo/Heme/Allergies: Negative.    Psychiatric/Behavioral: Positive for memory loss.     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 nose and back (NMSC), excised by     Squamous cell carcinoma 11/2014    right mid cheek, MOHS    Squamous cell carcinoma 11/2014    left mid cheek, MOHS       Past Surgical History:   Procedure Laterality Date    CARDIAC CATHETERIZATION  12/1/11    non obstructive    CIRCUMCISION, PRIMARY      HERNIA REPAIR      TONSILLECTOMY, ADENOIDECTOMY      TOTAL KNEE ARTHROPLASTY         Family History   Problem Relation Age of Onset    Stroke Mother     Diabetes Maternal Aunt      Social History    Marital status:      Social History Main Topics    Smoking status: Never Smoker    Smokeless tobacco: Never Used    Alcohol use No    Drug use: Unknown    Sexual activity: Not on file       Allergies:  Pcn [penicillins] and Sulfa (sulfonamide antibiotics)    Medications:  Outpatient Encounter Prescriptions as of 6/1/2018   Medication Sig Dispense Refill    aspirin (ECOTRIN) 81 MG EC tablet Take 81 mg by mouth once daily.       atorvastatin (LIPITOR) 40 MG tablet Take 1 tablet (40 mg total) by mouth once daily. 90 tablet 3    beclomethasone (QVAR) 40 mcg/actuation Aero Inhale 2 puffs into the lungs 2 (two) times daily. 8.7 each 3    carvedilol (COREG) 3.125 MG tablet Take 1 tablet (3.125 mg total) by mouth 2 (two) times daily with meals. 180 tablet 3    cetirizine (ZYRTEC) 10 MG tablet Take 10 mg by mouth once daily.      clobetasol (TEMOVATE) 0.05 % cream Apply topically once. Apply once a day. 30 g 5    [START ON 6/12/2018] difluprednate (DUREZOL) 0.05 % Drop ophthalmic solution Place 1 drop into the  left eye 4 (four) times daily. For 30 days 5 mL 1    dutasteride (AVODART) 0.5 mg capsule Take 1 capsule (0.5 mg total) by mouth once daily. 30 capsule 11    fluticasone (FLONASE) 50 mcg/actuation nasal spray 1 spray by Each Nare route once daily. 16 g 5    [START ON 6/9/2018] ILEVRO 0.3 % DrpS Place 1 drop into both eyes once daily. For 30 days 3 mL 1    losartan (COZAAR) 50 MG tablet Take 1 tablet (50 mg total) by mouth once daily. 90 tablet 3    [START ON 6/9/2018] ofloxacin (OCUFLOX) 0.3 % ophthalmic solution Place 1 drop into the left eye 4 (four) times daily. 5 mL 1    PROAIR HFA 90 mcg/actuation inhaler INHALE TWO PUFFS EVERY 6 HOURS AS NEEDED FOR WHEEZING 8.5 g 6    [DISCONTINUED] dutasteride (AVODART) 0.5 mg capsule Take 1 capsule (0.5 mg total) by mouth once daily. 30 capsule 11    omeprazole (PRILOSEC) 40 MG capsule Take 1 capsule (40 mg total) by mouth 2 (two) times daily before meals. 180 capsule 3    [DISCONTINUED] albuterol 90 mcg/actuation inhaler Inhale 2 puffs into the lungs every 6 (six) hours as needed for Wheezing. 8.5 g 12     Facility-Administered Encounter Medications as of 6/1/2018   Medication Dose Route Frequency Provider Last Rate Last Dose    [DISCONTINUED] lidocaine HCL 10 mg/ml (1%) injection 1 mL  1 mL Other 1 time in Clinic/HOD [place] PA-C             PHYSICAL EXAMINATION:    The patient generally appears in good health, is appropriately interactive, and is in no apparent distress.    Skin: No lesions.    Mental: Cooperative with normal affect.    Neuro: Grossly intact.    HEENT: Normal. No evidence of lymphadenopathy.    Chest:  normal inspiratory effort.    Abdomen: Soft, non-tender. No masses or organomegaly. Bladder is not palpable. No evidence of flank discomfort. No evidence of inguinal hernia.    Extremities: No clubbing, cyanosis, or edema    Scrotum showed no rashes or lesions. Testicles showed no masses or tenderness.  Epididymis showed no masses or  tenderness.  Penis was circumcised. No meatal stenosis. No penile discharge.  No inguinal hernias.  No inguinal lymphadenopathy.  The glans is red at the base, shiny and the area is flat.    REGINO:  40 g prostate without nodularity.  Normal rectal tone, no rectal masses  PVR by bladder scan performed by the nurse was 241 ml    LABS:    Lab Results   Component Value Date    BUN 14 06/01/2018    CREATININE 1.3 06/01/2018     UA 1.015, pH 6 otherwise, negative    IMPRESSION:    Encounter Diagnoses   Name Primary?    Dermatitis Yes    Benign non-nodular prostatic hyperplasia without lower urinary tract symptoms     Meatal stenosis        PLAN:    1.  Refilled the Avodart  2.  Will have him return on the same day as another appointment on main campus to recheck a PVR (he has a problem with stopping in the middle of the void to collect a specimen then cannot empty)  Will see how he does just voiding without collecting a specimen and obtaining a PVR  3.  They will see Dr. Ware at the end of the month.  Asked that she treat him.

## 2018-06-01 NOTE — PROGRESS NOTES
"Subjective:    Patient ID:  Dennis Rees is a 83 y.o. male who presents for follow-up of Coronary Artery Disease (1 yr f/u )      HPI   83 year old male is followed with non critical CAD, hypertension , hyperlipidemia and orthostatic light headedness. He report no chest pain or increase SALDANA. His wife volunteers that he is "slowibng down". His speech if more halting. 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 He has increased dementia since last seen.      Lab Results   Component Value Date     05/17/2017    K 4.6 05/17/2017     05/17/2017    CO2 29 05/17/2017    BUN 16 05/17/2017    CREATININE 1.0 05/17/2017    GLU 93 05/17/2017    MG 1.7 10/25/2011    AST 16 05/17/2017    ALT 12 05/17/2017    ALBUMIN 3.6 05/17/2017    PROT 6.8 05/17/2017    BILITOT 0.8 05/17/2017    WBC 5.93 05/17/2017    HGB 14.1 05/17/2017    HCT 38.9 (L) 05/17/2017    MCV 91 05/17/2017     05/17/2017    INR 1.0 11/30/2011    PSA 1.4 05/20/2015    TSH 2.360 10/17/2016         Lab Results   Component Value Date    CHOL 127 05/17/2017    HDL 42 05/17/2017    TRIG 59 05/17/2017       Lab Results   Component Value Date    LDLCALC 73.2 05/17/2017       Past Medical History:   Diagnosis Date    ALLERGIC RHINITIS     Asthma in adult     BPH (benign prostatic hypertrophy)     Cataract     Chronic systolic CHF (congestive heart failure) 10/11    resolved    Clostridium difficile colitis     Coronary artery disease 12/1/11    non obstructive    GERD (gastroesophageal reflux disease)     Heart attack     HTN (hypertension)     Hyperlipidemia     Hypertensive retinopathy of both eyes 3/5/2015    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 nose and back (NMSC), excised by     " Squamous cell carcinoma 11/2014    right mid cheek, [facility]    Squamous cell carcinoma 11/2014    left mid cheek, [facility]       Current Outpatient Prescriptions:     albuterol 90 mcg/actuation inhaler, Inhale 2 puffs into the lungs every 6 (six) hours as needed for Wheezing., Disp: 8.5 g, Rfl: 12    aspirin (ECOTRIN) 81 MG EC tablet, Take 81 mg by mouth once daily. , Disp: , Rfl:     atorvastatin (LIPITOR) 40 MG tablet, Take 1 tablet (40 mg total) by mouth once daily., Disp: 90 tablet, Rfl: 3    beclomethasone (QVAR) 40 mcg/actuation Aero, Inhale 2 puffs into the lungs 2 (two) times daily., Disp: 8.7 each, Rfl: 3    carvedilol (COREG) 3.125 MG tablet, Take 1 tablet (3.125 mg total) by mouth 2 (two) times daily with meals., Disp: 180 tablet, Rfl: 3    cetirizine (ZYRTEC) 10 MG tablet, Take 10 mg by mouth once daily., Disp: , Rfl:     clobetasol (TEMOVATE) 0.05 % cream, Apply topically once. Apply once a day., Disp: 30 g, Rfl: 5    dutasteride (AVODART) 0.5 mg capsule, Take 1 capsule (0.5 mg total) by mouth once daily., Disp: 30 capsule, Rfl: 11    fluticasone (FLONASE) 50 mcg/actuation nasal spray, 1 spray by Each Nare route once daily., Disp: 16 g, Rfl: 5    [START ON 6/9/2018] ILEVRO 0.3 % DrpS, Place 1 drop into both eyes once daily. For 30 days, Disp: 3 mL, Rfl: 1    losartan (COZAAR) 50 MG tablet, Take 1 tablet (50 mg total) by mouth once daily., Disp: 90 tablet, Rfl: 3    [START ON 6/12/2018] difluprednate (DUREZOL) 0.05 % Drop ophthalmic solution, Place 1 drop into the left eye 4 (four) times daily. For 30 days, Disp: 5 mL, Rfl: 1    [START ON 6/9/2018] ofloxacin (OCUFLOX) 0.3 % ophthalmic solution, Place 1 drop into the left eye 4 (four) times daily., Disp: 5 mL, Rfl: 1    omeprazole (PRILOSEC) 40 MG capsule, Take 1 capsule (40 mg total) by mouth 2 (two) times daily before meals., Disp: 180 capsule, Rfl: 3    Current Facility-Administered Medications:     lidocaine HCL 10 mg/ml (1%) injection 1 mL,  "1 mL, Other, 1 time in Clinic/HOD, Mary Daily PA-C        Review of Systems   Constitution: Positive for weakness and malaise/fatigue. Negative for decreased appetite, diaphoresis, fever, weight gain and weight loss.   HENT: Negative for congestion, ear discharge, ear pain and nosebleeds.    Eyes: Negative for blurred vision, double vision and visual disturbance.   Cardiovascular: Negative for chest pain, claudication, cyanosis, dyspnea on exertion, irregular heartbeat, leg swelling, near-syncope, orthopnea, palpitations, paroxysmal nocturnal dyspnea and syncope.   Respiratory: Negative for cough, hemoptysis, shortness of breath, sleep disturbances due to breathing, snoring, sputum production and wheezing.    Endocrine: Negative for polydipsia, polyphagia and polyuria.   Hematologic/Lymphatic: Negative for adenopathy and bleeding problem. Does not bruise/bleed easily.   Skin: Negative for color change, nail changes, poor wound healing and rash.   Musculoskeletal: Negative for muscle cramps and muscle weakness.   Gastrointestinal: Negative for abdominal pain, anorexia, change in bowel habit, hematochezia, nausea and vomiting.   Genitourinary: Negative for dysuria, frequency and hematuria.   Neurological: Positive for difficulty with concentration and disturbances in coordination. Negative for brief paralysis, excessive daytime sleepiness, dizziness, focal weakness, headaches, light-headedness, seizures and vertigo.   Psychiatric/Behavioral: Positive for memory loss. Negative for altered mental status and depression.   Allergic/Immunologic: Negative for persistent infections.        Objective:/69 (BP Location: Left arm, Patient Position: Sitting, BP Method: Large (Automatic))   Pulse 71   Ht 5' 11" (1.803 m)   Wt 98.2 kg (216 lb 7.9 oz)   BMI 30.19 kg/m²           Physical Exam   Constitutional: He is oriented to person, place, and time. He appears well-developed and well-nourished.   Mild obese "   HENT:   Head: Normocephalic.   Right Ear: External ear normal.   Left Ear: External ear normal.   Nose: Nose normal.   Inspection of lips, teeth and gums normal   Eyes: EOM are normal. Pupils are equal, round, and reactive to light. No scleral icterus.   Neck: Normal range of motion. Neck supple. No JVD present. No tracheal deviation present. No thyromegaly present.   Cardiovascular: Normal rate, regular rhythm and intact distal pulses.  Exam reveals no gallop and no friction rub.    No murmur heard.  Pulses:       Dorsalis pedis pulses are 2+ on the right side, and 2+ on the left side.        Posterior tibial pulses are 2+ on the right side, and 2+ on the left side.   Pulmonary/Chest: Effort normal and breath sounds normal.   Abdominal: Bowel sounds are normal. He exhibits no distension. There is no hepatosplenomegaly. There is no tenderness. There is no guarding.   Musculoskeletal: Normal range of motion. He exhibits no edema or tenderness.   Lymphadenopathy:   Palpation of neck and groin lymph nodes normal   Neurological: He is alert and oriented to person, place, and time. No cranial nerve deficit. He exhibits normal muscle tone. Coordination normal.   Skin: Skin is dry.   Palpation of skin normal   Psychiatric: His behavior is normal. Judgment and thought content normal.         Assessment:       1. Coronary artery disease involving native coronary artery of native heart without angina pectoris    2. Hyperlipidemia, unspecified hyperlipidemia type    3. Benign essential HTN    4. Orthostatic lightheadedness    5. Chronic systolic CHF (congestive heart failure)         Plan:       Dennis was seen today for coronary artery disease.    Diagnoses and all orders for this visit:    Coronary artery disease involving native coronary artery of native heart without angina pectoris    Hyperlipidemia, unspecified hyperlipidemia type  -     Lipid panel; Future; Expected date: 06/01/2018    Benign essential HTN  -     CBC  auto differential; Future; Expected date: 06/01/2018  -     Comprehensive metabolic panel; Future; Expected date: 06/01/2018    Orthostatic lightheadedness    Chronic systolic CHF (congestive heart failure)

## 2018-06-04 ENCOUNTER — TELEPHONE (OUTPATIENT)
Dept: OPHTHALMOLOGY | Facility: CLINIC | Age: 83
End: 2018-06-04

## 2018-06-04 DIAGNOSIS — H25.11 NS (NUCLEAR SCLEROSIS), RIGHT: Primary | ICD-10-CM

## 2018-06-06 ENCOUNTER — TELEPHONE (OUTPATIENT)
Dept: OPHTHALMOLOGY | Facility: CLINIC | Age: 83
End: 2018-06-06

## 2018-06-08 ENCOUNTER — TELEPHONE (OUTPATIENT)
Dept: OPHTHALMOLOGY | Facility: CLINIC | Age: 83
End: 2018-06-08

## 2018-06-10 NOTE — H&P
[facility]  History & Physical    Subjective:      Chief Complaint/Reason for Admission:     Dennis Rees is a 83 y.o. male.    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 nose and back (NMSC), excised by     Squamous cell carcinoma 11/2014    right mid cheek, [facility]    Squamous cell carcinoma 11/2014    left mid cheek, [facility]     Past Surgical History:   Procedure Laterality Date    CARDIAC CATHETERIZATION  12/1/11    non obstructive    CIRCUMCISION, PRIMARY      HERNIA REPAIR      TONSILLECTOMY, ADENOIDECTOMY      TOTAL KNEE ARTHROPLASTY       Family History   Problem Relation Age of Onset    Stroke Mother     Diabetes Maternal Aunt     No Known Problems Father     No Known Problems Sister     No Known Problems Brother     No Known Problems Maternal Uncle     No Known Problems Paternal Aunt     No Known Problems Paternal Uncle     No Known Problems Maternal Grandmother     No Known Problems Maternal Grandfather     No Known Problems Paternal Grandmother     No Known Problems Paternal Grandfather     No Known Problems Daughter     No Known Problems Son     Melanoma Neg Hx     Amblyopia Neg Hx     Blindness Neg Hx     Cancer Neg Hx     Cataracts Neg Hx     Glaucoma Neg Hx     Hypertension  Neg Hx     Macular degeneration Neg Hx     Retinal detachment Neg Hx     Strabismus Neg Hx     Thyroid disease Neg Hx     Tremor Neg Hx     Parkinsonism Neg Hx      Social History   Substance Use Topics    Smoking status: Never Smoker    Smokeless tobacco: Never Used    Alcohol use No       No prescriptions prior to admission.     Review of patient's allergies indicates:   Allergen Reactions    Pcn [penicillins] Other (See Comments)     Difficult breathing    Sulfa (sulfonamide antibiotics) Rash        Review of Systems   Eyes: Positive for blurred vision.   All other systems reviewed and are negative.      Objective:      Vital Signs (Most Recent)       Vital Signs Range (Last 24H):       Physical Exam   Constitutional: He is oriented to person, place, and time. He appears well-developed and well-nourished.   HENT:   Head: Normocephalic.   Eyes: Conjunctivae and EOM are normal. Pupils are equal, round, and reactive to light.   Neck: Normal range of motion. Neck supple.   Cardiovascular: Normal rate.    Pulmonary/Chest: Effort normal and breath sounds normal.   Abdominal: Soft. Bowel sounds are normal.   Musculoskeletal: Normal range of motion.   Neurological: He is alert and oriented to person, place, and time.   Skin: Skin is warm.   Psychiatric: He has a normal mood and affect.       Data Review:    ECG:     Assessment:      Cataract OS.    Plan:    CE OS.

## 2018-06-11 NOTE — PRE-PROCEDURE INSTRUCTIONS
"Spoke with Patient's Wife - Clarisse.  NPO, medication, and pre-op instructions reviewed.  Denies previous problems with Anesthesia.  Stated that his skin has become more sensitive as he ages.  Stated that a few years ago, he was diagnosed with the "start of Parkinson's."  He was not started on any medications for Parkinson's.  Stated that he has decreased thought process and may need to be told things more than once.  Wife verbalized understanding of instructions.    "

## 2018-06-12 ENCOUNTER — SURGERY (OUTPATIENT)
Age: 83
End: 2018-06-12

## 2018-06-12 ENCOUNTER — HOSPITAL ENCOUNTER (OUTPATIENT)
Facility: HOSPITAL | Age: 83
Discharge: HOME | End: 2018-06-12
Attending: OPHTHALMOLOGY | Admitting: OPHTHALMOLOGY
Payer: MEDICARE

## 2018-06-12 ENCOUNTER — ANESTHESIA EVENT (OUTPATIENT)
Dept: SURGERY | Facility: HOSPITAL | Age: 83
End: 2018-06-12
Payer: MEDICARE

## 2018-06-12 ENCOUNTER — ANESTHESIA (OUTPATIENT)
Dept: SURGERY | Facility: HOSPITAL | Age: 83
End: 2018-06-12
Payer: MEDICARE

## 2018-06-12 VITALS
TEMPERATURE: 98 F | WEIGHT: 215 LBS | HEIGHT: 71 IN | OXYGEN SATURATION: 99 % | SYSTOLIC BLOOD PRESSURE: 157 MMHG | BODY MASS INDEX: 30.1 KG/M2 | HEART RATE: 59 BPM | RESPIRATION RATE: 16 BRPM | DIASTOLIC BLOOD PRESSURE: 78 MMHG

## 2018-06-12 DIAGNOSIS — H25.10 SENILE NUCLEAR SCLEROSIS: ICD-10-CM

## 2018-06-12 PROCEDURE — 71000015 HC POSTOP RECOV 1ST HR: Performed by: OPHTHALMOLOGY

## 2018-06-12 PROCEDURE — 37000008 HC ANESTHESIA 1ST 15 MINUTES: Performed by: OPHTHALMOLOGY

## 2018-06-12 PROCEDURE — 36000707: Performed by: OPHTHALMOLOGY

## 2018-06-12 PROCEDURE — D9220A PRA ANESTHESIA: Mod: ANES,,, | Performed by: ANESTHESIOLOGY

## 2018-06-12 PROCEDURE — 66984 XCAPSL CTRC RMVL W/O ECP: CPT | Mod: LT,,, | Performed by: OPHTHALMOLOGY

## 2018-06-12 PROCEDURE — V2632 POST CHMBR INTRAOCULAR LENS: HCPCS | Performed by: OPHTHALMOLOGY

## 2018-06-12 PROCEDURE — 37000009 HC ANESTHESIA EA ADD 15 MINS: Performed by: OPHTHALMOLOGY

## 2018-06-12 PROCEDURE — 63600175 PHARM REV CODE 636 W HCPCS: Performed by: OPHTHALMOLOGY

## 2018-06-12 PROCEDURE — 36000706: Performed by: OPHTHALMOLOGY

## 2018-06-12 PROCEDURE — D9220A PRA ANESTHESIA: Mod: CRNA,,, | Performed by: NURSE ANESTHETIST, CERTIFIED REGISTERED

## 2018-06-12 PROCEDURE — 25000003 PHARM REV CODE 250: Performed by: OPHTHALMOLOGY

## 2018-06-12 DEVICE — LENS 22.5 ACRYSOF: Type: IMPLANTABLE DEVICE | Site: EYE | Status: FUNCTIONAL

## 2018-06-12 RX ORDER — CYCLOPENTOLATE HYDROCHLORIDE 10 MG/ML
1 SOLUTION/ DROPS OPHTHALMIC
Status: DISCONTINUED | OUTPATIENT
Start: 2018-06-12 | End: 2018-06-12 | Stop reason: HOSPADM

## 2018-06-12 RX ORDER — HYDROCODONE BITARTRATE AND ACETAMINOPHEN 5; 325 MG/1; MG/1
1 TABLET ORAL EVERY 4 HOURS PRN
Status: DISCONTINUED | OUTPATIENT
Start: 2018-06-12 | End: 2018-06-12 | Stop reason: HOSPADM

## 2018-06-12 RX ORDER — OFLOXACIN 3 MG/ML
1 SOLUTION/ DROPS OPHTHALMIC
Status: COMPLETED | OUTPATIENT
Start: 2018-06-12 | End: 2018-06-12

## 2018-06-12 RX ORDER — PREDNISOLONE ACETATE 10 MG/ML
SUSPENSION/ DROPS OPHTHALMIC
Status: DISCONTINUED
Start: 2018-06-12 | End: 2018-06-12 | Stop reason: HOSPADM

## 2018-06-12 RX ORDER — PROPARACAINE HYDROCHLORIDE 5 MG/ML
1 SOLUTION/ DROPS OPHTHALMIC
Status: DISCONTINUED | OUTPATIENT
Start: 2018-06-12 | End: 2018-06-12 | Stop reason: HOSPADM

## 2018-06-12 RX ORDER — EPINEPHRINE 1 MG/ML
INJECTION, SOLUTION INTRACARDIAC; INTRAMUSCULAR; INTRAVENOUS; SUBCUTANEOUS
Status: DISCONTINUED | OUTPATIENT
Start: 2018-06-12 | End: 2018-06-12 | Stop reason: HOSPADM

## 2018-06-12 RX ORDER — SODIUM CHLORIDE 9 MG/ML
INJECTION, SOLUTION INTRAVENOUS CONTINUOUS
Status: DISCONTINUED | OUTPATIENT
Start: 2018-06-12 | End: 2018-06-12 | Stop reason: HOSPADM

## 2018-06-12 RX ORDER — PHENYLEPHRINE HYDROCHLORIDE 25 MG/ML
1 SOLUTION/ DROPS OPHTHALMIC
Status: DISCONTINUED | OUTPATIENT
Start: 2018-06-12 | End: 2018-06-12 | Stop reason: HOSPADM

## 2018-06-12 RX ORDER — OFLOXACIN 3 MG/ML
SOLUTION/ DROPS OPHTHALMIC
Status: DISCONTINUED | OUTPATIENT
Start: 2018-06-12 | End: 2018-06-12 | Stop reason: HOSPADM

## 2018-06-12 RX ORDER — PREDNISOLONE ACETATE 10 MG/ML
SUSPENSION/ DROPS OPHTHALMIC
Status: DISCONTINUED | OUTPATIENT
Start: 2018-06-12 | End: 2018-06-12 | Stop reason: HOSPADM

## 2018-06-12 RX ORDER — TROPICAMIDE 10 MG/ML
1 SOLUTION/ DROPS OPHTHALMIC
Status: DISCONTINUED | OUTPATIENT
Start: 2018-06-12 | End: 2018-06-12 | Stop reason: HOSPADM

## 2018-06-12 RX ORDER — EPINEPHRINE 1 MG/ML
INJECTION, SOLUTION INTRACARDIAC; INTRAMUSCULAR; INTRAVENOUS; SUBCUTANEOUS
Status: DISCONTINUED
Start: 2018-06-12 | End: 2018-06-12 | Stop reason: HOSPADM

## 2018-06-12 RX ORDER — ACETAMINOPHEN 325 MG/1
650 TABLET ORAL EVERY 4 HOURS PRN
Status: DISCONTINUED | OUTPATIENT
Start: 2018-06-12 | End: 2018-06-12 | Stop reason: HOSPADM

## 2018-06-12 RX ORDER — LIDOCAINE HYDROCHLORIDE 40 MG/ML
INJECTION, SOLUTION RETROBULBAR
Status: DISCONTINUED
Start: 2018-06-12 | End: 2018-06-12 | Stop reason: HOSPADM

## 2018-06-12 RX ORDER — LIDOCAINE HYDROCHLORIDE 40 MG/ML
INJECTION, SOLUTION RETROBULBAR
Status: DISCONTINUED | OUTPATIENT
Start: 2018-06-12 | End: 2018-06-12 | Stop reason: HOSPADM

## 2018-06-12 RX ADMIN — CYCLOPENTOLATE HYDROCHLORIDE 1 DROP: 10 SOLUTION/ DROPS OPHTHALMIC at 09:06

## 2018-06-12 RX ADMIN — PROPARACAINE HYDROCHLORIDE 1 DROP: 5 SOLUTION/ DROPS OPHTHALMIC at 09:06

## 2018-06-12 RX ADMIN — OFLOXACIN 1 DROP: 3 SOLUTION OPHTHALMIC at 09:06

## 2018-06-12 RX ADMIN — PREDNISOLONE ACETATE 2 DROP: 10 SUSPENSION/ DROPS OPHTHALMIC at 10:06

## 2018-06-12 RX ADMIN — TROPICAMIDE 1 DROP: 10 SOLUTION/ DROPS OPHTHALMIC at 09:06

## 2018-06-12 RX ADMIN — EPINEPHRINE 0.3 MG: 1 INJECTION, SOLUTION INTRAMUSCULAR; SUBCUTANEOUS at 10:06

## 2018-06-12 RX ADMIN — SODIUM CHONDROITIN SULFATE / SODIUM HYALURONATE 1.05 ML: 0.55-0.5 INJECTION INTRAOCULAR at 10:06

## 2018-06-12 RX ADMIN — OFLOXACIN 2 DROP: 3 SOLUTION/ DROPS OPHTHALMIC at 10:06

## 2018-06-12 RX ADMIN — LIDOCAINE HYDROCHLORIDE 5 ML: 40 INJECTION, SOLUTION RETROBULBAR; TOPICAL at 10:06

## 2018-06-12 RX ADMIN — PHENYLEPHRINE HYDROCHLORIDE 1 DROP: 25 SOLUTION/ DROPS OPHTHALMIC at 09:06

## 2018-06-12 RX ADMIN — SODIUM CHLORIDE 1000 ML: 0.9 INJECTION, SOLUTION INTRAVENOUS at 09:06

## 2018-06-12 NOTE — ANESTHESIA POSTPROCEDURE EVALUATION
"Anesthesia Post Evaluation    Patient: Dennis Rees    Procedure(s) Performed: Procedure(s) (LRB):  INSERTION, INTRAOCULAR LENS (Left)  PHACOEMULSIFICATION, CATARACT (Left)    Final Anesthesia Type: MAC  Patient location during evaluation: PACU  Patient participation: Yes- Able to Participate  Level of consciousness: awake and alert  Post-procedure vital signs: reviewed and stable  Pain management: adequate  Airway patency: patent  PONV status at discharge: No PONV  Anesthetic complications: no      Cardiovascular status: blood pressure returned to baseline  Respiratory status: unassisted  Hydration status: euvolemic  Follow-up not needed.        Visit Vitals  BP (!) 157/78 (BP Location: Right arm, Patient Position: Lying)   Pulse (!) 59   Temp 36.4 °C (97.5 °F) (Temporal)   Resp 16   Ht 5' 11" (1.803 m)   Wt 97.5 kg (215 lb)   SpO2 99%   BMI 29.99 kg/m²       Pain/Ashlie Score: Pain Assessment Performed: Yes (6/12/2018 12:07 PM)  Presence of Pain: denies (6/12/2018 12:07 PM)  Ashlie Score: 10 (6/12/2018 12:07 PM)      "

## 2018-06-12 NOTE — BRIEF OP NOTE
Operative Note     SUMMARY     Surgery Date: 6/12/2018    Surgeon(s) and Role:      Jose Richmond MD - Primary    Pre-op Diagnosis:  Nuclear sclerosis     Post-op/ Diagnosis:  Same    Final Diagnosis: Cataract    Procedure(s) (LRB):  PHACOEMULSIFICATION-ASPIRATION-CATARACT   INSERTION-INTRAOCULAR LENS (IOL)     Anesthesia: Monitored Anesthesia Care    Findings/Key Components:  Cataract    Outcome: Tolerated procedure well    Estimated Blood Loss: None         Specimens     None          Follow-up:  Tomorrow in clinic      Discharge Note      SUMMARY     Admit Date: 6/12/2018    Attending Physician: Jose Richmond MD    Discharge Physician: Jose Richmond MD    Discharge Date: 6/12/2018    Final Diagnosis: Cataract    Outcome: Tolerated procedure well    Disposition: Discharge to Home.    Medications:       Dennis Rees   Home Medication Instructions MATT:[id number]    Printed on:06/12/18 1124   Medication Information                      aspirin (ECOTRIN) 81 MG EC tablet  Take 81 mg by mouth nightly.              atorvastatin (LIPITOR) 40 MG tablet  Take 1 tablet (40 mg total) by mouth once daily.             beclomethasone (QVAR) 40 mcg/actuation Aero  Inhale 2 puffs into the lungs 2 (two) times daily.             carvedilol (COREG) 3.125 MG tablet  Take 1 tablet (3.125 mg total) by mouth 2 (two) times daily with meals.             cetirizine (ZYRTEC) 10 MG tablet  Take 10 mg by mouth every morning.              clobetasol (TEMOVATE) 0.05 % cream  Apply topically once. Apply once a day.             difluprednate (DUREZOL) 0.05 % Drop ophthalmic solution  Place 1 drop into the left eye 4 (four) times daily. For 30 days             dutasteride (AVODART) 0.5 mg capsule  Take 1 capsule (0.5 mg total) by mouth once daily.             fluticasone (FLONASE) 50 mcg/actuation nasal spray  1 spray by Each Nare route once daily.             ILEVRO 0.3 % DrpS  Place 1 drop into both eyes once daily.  For 30 days             losartan (COZAAR) 50 MG tablet  Take 1 tablet (50 mg total) by mouth once daily.             ofloxacin (OCUFLOX) 0.3 % ophthalmic solution  Place 1 drop into the left eye 4 (four) times daily.             omeprazole (PRILOSEC) 40 MG capsule  Take 1 capsule (40 mg total) by mouth 2 (two) times daily before meals.             PROAIR HFA 90 mcg/actuation inhaler  INHALE TWO PUFFS EVERY 6 HOURS AS NEEDED FOR WHEEZING                   Patient Discharge Instructions:     Keep Kang Shield over operated eye when not using drops.     DIET:  Regular    Activity: No heavy lifting or bending X 1 week.    Follow-up:  Tomorrow in clinic

## 2018-06-12 NOTE — PROGRESS NOTES
Patient resting on stretcher with HOB 45 degrees. No acute distress noted. Updated on Plan of care. Call bell in reach. SR up x 2.Instructed patient and spouse to call for needs.

## 2018-06-12 NOTE — PLAN OF CARE
Patient and wife given discharge and follow up instructions. No questions or concerns expressed at this time. Patient denies pain and nausea

## 2018-06-12 NOTE — DISCHARGE INSTRUCTIONS
Discharge Instructions for Cataract Surgery  A surgeon removed the cloudy lens in your eye and replaced it with a clear man-made lens. Be sure to have an adult family member or friend drive you home after surgery. Heres what you can expect following surgery and tips for a healthy recovery.  It is normal to have the following:  · Bruised or bloodshot eye for 7 days  · Itching and mild discomfort for several days  · Some fluid discharge  · Sensitivity to light  · Scratchy, sandlike feeling in the eye for 2 weeks  · Feeling tired, especially during the first 24 hours  Activity level  · Do not drive for 2 days or as instructed by your doctor.  · Do not drink alcohol for at least 24 hours.  · Avoid bending at the waist to  objects or lifting anything heavy for 2 days.  · Relax for the first 24 hours after surgery. Watching TV and reading are OK and wont harm your eye.  Eye protection  · Do not rub or press on your eye.  · Sleep on your back or on your unoperated side for 2 nights.  · If instructed, wear a bandage over your eye for 2 days and 2 nights.  · If instructed, wear a shield to protect your eye for 2 days and 2 nights.  Using eyedrops  You may be given special eyedrops or ointment. Here is one way to use eyedrops:  · Tilt your head back.  · Pull your bottom eyelid down.  · Squeeze one drop into your eye. Do not touch your eye with the bottle tip.  · Close your eyes for a few seconds.  · If you need more than one drop, wait a few minutes before adding the next one.   Call your doctor right away if you have any of the following:  · Bleeding or discharge from the eye  · Your vision suddenly becomes worse  · Pain medicine you are told to take does not ease your pain  · Nausea or vomiting  · Chills or fever over 100.4°F (39.1°C)   Date Last Reviewed: 5/30/2015  © 9551-4749 [employer]. [address] [place] [address], [place] [address]. All rights reserved. This information is not intended as a  substitute for professional medical care. Always follow your healthcare professional's instructions.

## 2018-06-12 NOTE — PLAN OF CARE
Discharge instructions reviewed with pt and spouse, verbalized understanding, questions answered.  VSS, no c/o pain or nausea, consents in chart.  IV to be removed prior to discharge, pt has all belongings.

## 2018-06-12 NOTE — TRANSFER OF CARE
"Anesthesia Transfer of Care Note    Patient: Dennis Rees    Procedure(s) Performed: Procedure(s) (LRB):  INSERTION, INTRAOCULAR LENS (Left)  PHACOEMULSIFICATION, CATARACT (Left)    Patient location: PACU    Anesthesia Type: MAC    Transport from OR: Transported from OR on room air with adequate spontaneous ventilation    Post pain: adequate analgesia    Post assessment: no apparent anesthetic complications    Post vital signs: stable    Level of consciousness: awake    Nausea/Vomiting: no nausea/vomiting    Complications: none    Transfer of care protocol was followed      Last vitals:   Visit Vitals  BP (!) 144/74 (BP Location: Right arm, Patient Position: Lying)   Pulse (!) 59   Temp 36.5 °C (97.7 °F) (Temporal)   Resp 20   Ht 5' 11" (1.803 m)   Wt 97.5 kg (215 lb)   SpO2 98%   BMI 29.99 kg/m²     "

## 2018-06-12 NOTE — ANESTHESIA PREPROCEDURE EVALUATION
06/12/2018  Dennis Rees is a 83 y.o., male.    Anesthesia Evaluation         Review of Systems  Anesthesia Hx:  No problems with previous Anesthesia   Social:  Non-Smoker, No Alcohol Use    Hematology/Oncology:        Oncology Comments: Hx of Squamous cell carcinoma   Cardiovascular:   Hypertension Past MI CAD  Dysrhythmias (V-tach)  CHF hyperlipidemia EF= 43%   Pulmonary:   Asthma    Renal/:   BPH    Hepatic/GI:   GERD    Musculoskeletal:   Arthritis         Physical Exam  General:  Obesity    Airway/Jaw/Neck:  Airway Findings: Mouth Opening: Normal Tongue: Normal  General Airway Assessment: Adult            Mental Status:  Mental Status Findings:  Cooperative, Alert and Oriented         Anesthesia Plan  Type of Anesthesia, risks & benefits discussed:  Anesthesia Type:  MAC  Patient's Preference:   Intra-op Monitoring Plan: standard ASA monitors  Intra-op Monitoring Plan Comments:   Post Op Pain Control Plan:   Post Op Pain Control Plan Comments:   Induction:   IV  Beta Blocker:  Patient is on a Beta-Blocker and has received one dose within the past 24 hours (No further documentation required).       Informed Consent: Patient understands risks and agrees with Anesthesia plan.  Questions answered. Anesthesia consent signed with patient.  ASA Score: 3     Day of Surgery Review of History & Physical:    H&P update referred to the surgeon.         Ready For Surgery From Anesthesia Perspective.

## 2018-06-13 ENCOUNTER — OFFICE VISIT (OUTPATIENT)
Dept: OPHTHALMOLOGY | Facility: CLINIC | Age: 83
End: 2018-06-13
Payer: MEDICARE

## 2018-06-13 VITALS — HEART RATE: 59 BPM | DIASTOLIC BLOOD PRESSURE: 75 MMHG | SYSTOLIC BLOOD PRESSURE: 157 MMHG

## 2018-06-13 DIAGNOSIS — Z98.890 POST-OPERATIVE STATE: Primary | ICD-10-CM

## 2018-06-13 PROCEDURE — 99212 OFFICE O/P EST SF 10 MIN: CPT | Mod: PBBFAC,PO | Performed by: OPHTHALMOLOGY

## 2018-06-13 PROCEDURE — 99999 PR PBB SHADOW E&M-EST. PATIENT-LVL II: CPT | Mod: PBBFAC,,, | Performed by: OPHTHALMOLOGY

## 2018-06-13 PROCEDURE — 99024 POSTOP FOLLOW-UP VISIT: CPT | Mod: POP,,, | Performed by: OPHTHALMOLOGY

## 2018-06-13 NOTE — OP NOTE
DATE OF PROCEDURE:  06/12/2018.    SURGEON:  Jose Richmond M.D.    PREOPERATIVE DIAGNOSIS:  Nuclear sclerotic cataract, left eye.    POSTOPERATIVE DIAGNOSIS:  Nuclear sclerotic cataract, left eye.     PROCEDURE:  Clear corneal phacoemulsification with posterior chamber intraocular   lens implant, left eye.     ANESTHESIA:  Monitored anesthesia care with 2% Xylocaine jelly topically, 1%   free lidocaine topically and intrachamberly.     PROCEDURE IN DETAIL:  After the appropriate preoperative consent was obtained,   the patient had the 2% Xylocaine jelly applied to the left cornea.  The patient   was then brought to the operating room and placed into the supine position.  The   left periorbit was then prepped and draped in the usual sterile ophthalmic   fashion.  A lid speculum was then inserted into the left eye.  Several drops of   the 1% lidocaine were placed onto the left cornea.  The 1% lidocaine was also   applied to the perilimbal region with the Weck-sade sponge.  Using the 0.12-mm   forceps and the Super Sharp blade, a paracentesis site was made at the 6 o'clock   position.  Approximately 0.5 mL of the 1% lidocaine was injected into the   anterior chamber.  Next, Viscoat was injected into the anterior chamber through   the paracentesis site.  The 2.75-mm keratome and the cyclodialysis spatula were   used to create a 2.75-mm clear corneal temporal groove.  The cystitomy needle   and Utrata forceps were then used to create a continuous tear 360-degree   capsulorrhexis.  BSS in a cannula was then used for hydrodissection.    Phacoemulsification then proceeded in a stop-and-chop fashion without any   complications.  Another 0.5 mL of the 1% lidocaine was injected into the   anterior chamber.  The curved tip irrigation aspiration handpiece was then used   to remove the residual cortical material from the capsular bag.  Again, the 1%   lidocaine was applied to the perilimbal region with the Weck-sade sponge.   Healon   GV was then injected into the anterior chamber and capsular bag.  An [employer] intraocular lens model SN60WF, 22.5 diopters in power, and serial   number [id number] was injected into the capsular bag with the lens injector.    The Sinskey hook was used to position the lens into its proper place.  The   residual viscoelastic material was removed from the anterior chamber and   capsular bag with the curved tip irrigation aspiration handpiece.  Both wounds   were hydrated with BSS on a cannula.  Both wounds were checked and found to be   watertight.  The lid speculum was then removed from the left eye.  The patient   then had 1 drop of Vigamox ophthalmic drop and 1 drop of Econopred +1%   ophthalmic drop instilled onto the left cornea.  The eye was then shielded.  The   patient tolerated the procedure well and was then brought to the recovery room   in good condition.      EROS  dd: 06/12/2018 16:29:04 (CDT)  td: 06/12/2018 19:04:27 (CDT)  Doc ID   #[id number]  Job ID #[id number]    CC:

## 2018-06-13 NOTE — PROGRESS NOTES
Subjective:       Patient ID: Dennis Rees is a 83 y.o. male.    Chief Complaint: Post-op Evaluation (1 day po os)    HPI     Post-op Evaluation    Additional comments: 1 day po os           Comments   S/P Phaco w/IOL OS- 6/12/18    1 day po os- Pt states sx went well. Pt denies pain and discomfort.     Eyemeds  Ofloxacin QID OS  Durezol QID OS  Ilevro QD OS        Last edited by Mabel Barton on 6/13/2018  8:21 AM. (History)             Assessment:       1. Post-operative state        Plan:       S/p CE OS- Doing well.           CPM OS.  RTC 1 wk.

## 2018-06-14 ENCOUNTER — PROCEDURE VISIT (OUTPATIENT)
Dept: OPHTHALMOLOGY | Facility: CLINIC | Age: 83
End: 2018-06-14
Payer: MEDICARE

## 2018-06-14 DIAGNOSIS — H35.033 HYPERTENSIVE RETINOPATHY OF BOTH EYES: ICD-10-CM

## 2018-06-14 DIAGNOSIS — H34.8110 CENTRAL RETINAL VEIN OCCLUSION WITH MACULAR EDEMA OF RIGHT EYE: Primary | ICD-10-CM

## 2018-06-14 DIAGNOSIS — H35.351 CYSTOID MACULAR EDEMA, RIGHT EYE: ICD-10-CM

## 2018-06-14 PROCEDURE — 99499 UNLISTED E&M SERVICE: CPT | Mod: S$PBB,,, | Performed by: OPHTHALMOLOGY

## 2018-06-14 PROCEDURE — 67028 INJECTION EYE DRUG: CPT | Mod: S$PBB,79,RT, | Performed by: OPHTHALMOLOGY

## 2018-06-14 PROCEDURE — 92134 CPTRZ OPH DX IMG PST SGM RTA: CPT | Mod: PBBFAC,PO | Performed by: OPHTHALMOLOGY

## 2018-06-14 PROCEDURE — C9257 BEVACIZUMAB INJECTION: HCPCS | Mod: PBBFAC,JG,PO | Performed by: OPHTHALMOLOGY

## 2018-06-14 PROCEDURE — 67028 INJECTION EYE DRUG: CPT | Mod: PBBFAC,PO,RT | Performed by: OPHTHALMOLOGY

## 2018-06-14 RX ADMIN — BEVACIZUMAB 1.25 MG: 100 INJECTION, SOLUTION INTRAVENOUS at 12:06

## 2018-06-14 NOTE — PROGRESS NOTES
HPI     Eye Problem    Additional comments: 1 month check           Comments   DLS 5/3/18- patient is s/p phaco OS with Guilbrockette 6/12/18. He feels   vision a little clearer    Ofloxacin x 4 OS  durezol x 4 OS   ilevro daily OS      Prior FP - OD - swollen ONH with heme  OS - ONH normal - with small cup    OCT - CME OD increased  OS No ME      A/P    1. CRVO OD with CME  VMT component  S/p Avastin OD x 12    12/15 - increase fluid at 8 weeks - resumed 5-6 week tx  8/16 - no CME, try observation  11/16 only small CME - continue to watch  5/18 - increased CME  6/18 - CME improving    Avastin OD    2.. AION OD  HTN with disc at risk  May get some vision improvement as disc swelling subsides, but pt aware of poor prognosis      3. HTN Ret OU  BP control    4. NS OD/PCIOL OS   Dr. RUSSELL      5-6 weeks OCT    Risks, benefits, and alternatives to treatment discussed in detail with the patient.  The patient voiced understanding and wished to proceed with the procedure    Injection Procedure Note:  Diagnosis: CRVO with CME OD    Topical Proparacaine and Betadine.  Inject Avastin OD at 6:00 @ 3.5-4mm posterior to limbus  Post Operative Dx: Same  Complications: None  Follow up as above.

## 2018-06-14 NOTE — PATIENT INSTRUCTIONS
POST INTRAVITREAL INJECTION PATIENT INSTRUCTIONS   Below are some guidelines for what to expect after your treatment and additional care instructions.   * you may experience mild discomfort in your eye after the treatment. Your eye usually feels better within 24 to 48 hours after the procedure.   * you have been given drops that numb the surface of your eye.   DO NOT rub or touch your eye, DO NOT wear contacts until numbness goes away.   * you may experience small spots that appear in your field of vision, these are usually caused by an air bubble or from the medication. It taked a few hours or days for these to go away.   * use of sunglasses will help reduce light sensitivity and glare.   * DO NOT swim or put sink water ( tap water ) or swin for at least 24 hours after the injection   * If you have a gritty, burning, irritating or stinging feeling in the injected eye. This may be a result of the antiseptic used. Use artifical tears or eye lubricant ( from over- the- counter from your local pharmacy ). If you have some at home already please check the expiration date, so not to use anything contaminated in your eye. A cool pack over your eye brow above the injected eye may also relieve discomfort.   Call us right away or go to the Emergency Department if you have a dramatic decrease in vision or extreme pain in the eye.   [facility] [phone]

## 2018-06-19 DIAGNOSIS — I10 BENIGN ESSENTIAL HTN: ICD-10-CM

## 2018-06-19 RX ORDER — LOSARTAN POTASSIUM 50 MG/1
TABLET ORAL
Qty: 90 TABLET | Refills: 3 | Status: SHIPPED | OUTPATIENT
Start: 2018-06-19 | End: 2018-06-21 | Stop reason: SDUPTHER

## 2018-06-19 RX ORDER — LOSARTAN POTASSIUM 50 MG/1
50 TABLET ORAL DAILY
Qty: 90 TABLET | Refills: 3 | Status: SHIPPED | OUTPATIENT
Start: 2018-06-19 | End: 2018-06-21

## 2018-06-19 NOTE — TELEPHONE ENCOUNTER
----- Message from Dinora Hernandes sent at 6/19/2018  3:44 PM CDT -----  Contact: pt's wife  RX request - refill or new RX.  Is this a refill or new RX:  refill  RX name and strength: losartan (COZAAR) 50 MG tablet  Directions: Take 1 tablet (50 mg total) by mouth once daily  Is this a 30 day or 90 day RX:  90  Local pharmacy or mail order pharmacy:  [facility]  Comments: check additional directions    Dr. Ag  LOV 6/1/18

## 2018-06-20 ENCOUNTER — OFFICE VISIT (OUTPATIENT)
Dept: OPHTHALMOLOGY | Facility: CLINIC | Age: 83
End: 2018-06-20
Payer: MEDICARE

## 2018-06-20 ENCOUNTER — TELEPHONE (OUTPATIENT)
Dept: OPHTHALMOLOGY | Facility: CLINIC | Age: 83
End: 2018-06-20

## 2018-06-20 DIAGNOSIS — Z98.890 POST-OPERATIVE STATE: Primary | ICD-10-CM

## 2018-06-20 DIAGNOSIS — H25.11 NS (NUCLEAR SCLEROSIS), RIGHT: ICD-10-CM

## 2018-06-20 DIAGNOSIS — J30.89 ENVIRONMENTAL AND SEASONAL ALLERGIES: ICD-10-CM

## 2018-06-20 PROCEDURE — 92136 OPHTHALMIC BIOMETRY: CPT | Mod: 26,S$PBB,RT, | Performed by: OPHTHALMOLOGY

## 2018-06-20 PROCEDURE — 99999 PR PBB SHADOW E&M-EST. PATIENT-LVL II: CPT | Mod: PBBFAC,,, | Performed by: OPHTHALMOLOGY

## 2018-06-20 PROCEDURE — 99212 OFFICE O/P EST SF 10 MIN: CPT | Mod: PBBFAC,PO | Performed by: OPHTHALMOLOGY

## 2018-06-20 PROCEDURE — 99024 POSTOP FOLLOW-UP VISIT: CPT | Mod: POP,,, | Performed by: OPHTHALMOLOGY

## 2018-06-20 PROCEDURE — 92136 OPHTHALMIC BIOMETRY: CPT | Mod: PBBFAC,PO | Performed by: OPHTHALMOLOGY

## 2018-06-20 NOTE — PROGRESS NOTES
Subjective:       Patient ID: Dennis Rees is a 83 y.o. male.    Chief Complaint: Post-op Evaluation (1 week PO OS )    HPI     Post-op Evaluation    Additional comments: 1 week PO OS            Comments   83 y.o. Male is here for 1 week post op left eye. Denies eye pain f/f. No   discomfort.     Meds: Durezol BID OS             Ilevro qd OS       Last edited by ELIZABETH Hightower on 6/20/2018 11:28 AM. (History)             Assessment:       1. Post-operative state    2. NS (nuclear sclerosis), right        Plan:       S/p CE OS- Doing well.     Visually significant cataract OD -Pt. Wants Sx.        Taper gtts OS.  Cataract Surgery Consent: Patient with a visually significant cataract with difficulties of ADLs, reading, driving, night vision, glare (any and all).  Discussed with Patient/Family/Caregiver: options, risks and benefits, expectations of cataract surgery, utilized an eye model with questions and answers to facilitate discussion.  Discussed lens options and patient understands that glasses may be required for optimal vision for distance and/or near vision after cataract surgery.  The Patient/Family/Caregiver  voice good understanding and patient wishes to proceed with surgery.  The patient will likely benefit from surgery and patient signed consent for Right Eye.  CE OD 6/26/18.

## 2018-06-21 DIAGNOSIS — I95.1 DELAYED ORTHOSTATIC HYPOTENSION: ICD-10-CM

## 2018-06-21 DIAGNOSIS — K21.9 GASTROESOPHAGEAL REFLUX DISEASE, ESOPHAGITIS PRESENCE NOT SPECIFIED: ICD-10-CM

## 2018-06-21 DIAGNOSIS — E78.5 HYPERLIPIDEMIA, UNSPECIFIED HYPERLIPIDEMIA TYPE: ICD-10-CM

## 2018-06-21 DIAGNOSIS — J30.89 ENVIRONMENTAL AND SEASONAL ALLERGIES: ICD-10-CM

## 2018-06-21 DIAGNOSIS — I10 BENIGN ESSENTIAL HTN: ICD-10-CM

## 2018-06-21 DIAGNOSIS — J45.40 ASTHMA IN ADULT, MODERATE PERSISTENT, UNCOMPLICATED: ICD-10-CM

## 2018-06-21 DIAGNOSIS — J45.901 EXACERBATION OF ASTHMA, UNSPECIFIED ASTHMA SEVERITY, UNSPECIFIED WHETHER PERSISTENT: ICD-10-CM

## 2018-06-21 DIAGNOSIS — J45.901 ASTHMA EXACERBATION: ICD-10-CM

## 2018-06-21 RX ORDER — CARVEDILOL 3.12 MG/1
TABLET ORAL
Qty: 180 TABLET | Status: CANCELLED | OUTPATIENT
Start: 2018-06-21

## 2018-06-21 RX ORDER — OMEPRAZOLE 40 MG/1
CAPSULE, DELAYED RELEASE ORAL
Qty: 180 CAPSULE | Status: CANCELLED | OUTPATIENT
Start: 2018-06-21

## 2018-06-21 RX ORDER — FLUTICASONE PROPIONATE 50 MCG
SPRAY, SUSPENSION (ML) NASAL
Qty: 16 G | Refills: 6 | Status: SHIPPED | OUTPATIENT
Start: 2018-06-21 | End: 2018-06-21 | Stop reason: SDUPTHER

## 2018-06-21 RX ORDER — ATORVASTATIN CALCIUM 40 MG/1
TABLET, FILM COATED ORAL
Qty: 90 TABLET | Status: CANCELLED | OUTPATIENT
Start: 2018-06-21

## 2018-06-21 NOTE — TELEPHONE ENCOUNTER
----- Message from Yokasta Martines sent at 6/21/2018 11:48 AM CDT -----  Contact: Loreto with [employer]  Pt need all of his maintenance meds refilled. She does not know which ones they are.   LOV 6/1/18 Dr. Ag     Thanks

## 2018-06-22 RX ORDER — LOSARTAN POTASSIUM 50 MG/1
TABLET ORAL
Qty: 90 TABLET | Refills: 3 | Status: SHIPPED | OUTPATIENT
Start: 2018-06-22 | End: 2019-07-18 | Stop reason: RX

## 2018-06-22 RX ORDER — OMEPRAZOLE 40 MG/1
40 CAPSULE, DELAYED RELEASE ORAL
Qty: 180 CAPSULE | Refills: 3 | Status: SHIPPED | OUTPATIENT
Start: 2018-06-22 | End: 2019-07-09 | Stop reason: SDUPTHER

## 2018-06-22 RX ORDER — ATORVASTATIN CALCIUM 40 MG/1
40 TABLET, FILM COATED ORAL NIGHTLY
Qty: 90 TABLET | Refills: 3 | Status: SHIPPED | OUTPATIENT
Start: 2018-06-22 | End: 2019-12-09 | Stop reason: SDUPTHER

## 2018-06-22 RX ORDER — FLUTICASONE PROPIONATE 50 MCG
SPRAY, SUSPENSION (ML) NASAL
Qty: 16 G | Refills: 3 | Status: SHIPPED | OUTPATIENT
Start: 2018-06-22 | End: 2019-07-08 | Stop reason: SDUPTHER

## 2018-06-22 RX ORDER — LOSARTAN POTASSIUM 50 MG/1
50 TABLET ORAL DAILY
Qty: 90 TABLET | Refills: 3 | Status: SHIPPED | OUTPATIENT
Start: 2018-06-22 | End: 2019-07-18 | Stop reason: RX

## 2018-06-22 RX ORDER — BECLOMETHASONE DIPROPIONATE HFA 40 UG/1
AEROSOL, METERED RESPIRATORY (INHALATION)
Qty: 10.6 G | Refills: 3 | Status: SHIPPED | OUTPATIENT
Start: 2018-06-22 | End: 2019-10-03 | Stop reason: SDUPTHER

## 2018-06-22 RX ORDER — CARVEDILOL 3.12 MG/1
TABLET ORAL
Qty: 180 TABLET | Refills: 3 | Status: SHIPPED | OUTPATIENT
Start: 2018-06-22 | End: 2019-07-08 | Stop reason: SDUPTHER

## 2018-06-24 NOTE — H&P
[facility]  History & Physical    Subjective:      Chief Complaint/Reason for Admission:     Dennis Rees is a 83 y.o. male.    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 nose and back (NMSC), excised by     Squamous cell carcinoma 11/2014    right mid cheek, MOHS    Squamous cell carcinoma 11/2014    left mid cheek, MOHS     Past Surgical History:   Procedure Laterality Date    CARDIAC CATHETERIZATION  12/1/11    non obstructive    CATARACT EXTRACTION W/  INTRAOCULAR LENS IMPLANT Left 06/12/2018    Dr. Richmond    CIRCUMCISION, PRIMARY      HERNIA REPAIR      INTRAOCULAR PROSTHESES INSERTION Left 6/12/2018    Procedure: INSERTION, INTRAOCULAR LENS;  Surgeon: Jose Richmond MD;  [facility] [facility] [place] [address] [place] [address];  Service: Ophthalmology;  Laterality: Left;    PHACOEMULSIFICATION OF CATARACT Left 6/12/2018    Procedure: PHACOEMULSIFICATION, CATARACT;  Surgeon: Jose Richmond MD;  [place] [facility] [place] [address] [place] [address];  Service: Ophthalmology;  Laterality: Left;    TONSILLECTOMY, ADENOIDECTOMY      TOTAL KNEE ARTHROPLASTY       Family History   Problem Relation Age of Onset    Stroke Mother     Diabetes Maternal Aunt     No Known Problems Father     No Known Problems  Sister     No Known Problems Brother     No Known Problems Maternal Uncle     No Known Problems Paternal Aunt     No Known Problems Paternal Uncle     No Known Problems Maternal Grandmother     No Known Problems Maternal Grandfather     No Known Problems Paternal Grandmother     No Known Problems Paternal Grandfather     No Known Problems Daughter     No Known Problems Son     Melanoma Neg Hx     Amblyopia Neg Hx     Blindness Neg Hx     Cancer Neg Hx     Cataracts Neg Hx     Glaucoma Neg Hx     Hypertension Neg Hx     Macular degeneration Neg Hx     Retinal detachment Neg Hx     Strabismus Neg Hx     Thyroid disease Neg Hx     Tremor Neg Hx     Parkinsonism Neg Hx      Social History   Substance Use Topics    Smoking status: Never Smoker    Smokeless tobacco: Never Used    Alcohol use No       No prescriptions prior to admission.     Review of patient's allergies indicates:   Allergen Reactions    Pcn [penicillins] Shortness Of Breath     Difficult breathing, Happened years ago    Sulfa (sulfonamide antibiotics) Rash        Review of Systems   Eyes: Positive for blurred vision.   All other systems reviewed and are negative.      Objective:      Vital Signs (Most Recent)       Vital Signs Range (Last 24H):       Physical Exam   Constitutional: He is oriented to person, place, and time. He appears well-developed and well-nourished.   HENT:   Head: Normocephalic.   Eyes: Conjunctivae and EOM are normal. Pupils are equal, round, and reactive to light.   Neck: Normal range of motion. Neck supple.   Cardiovascular: Normal rate.    Pulmonary/Chest: Effort normal and breath sounds normal.   Abdominal: Soft. Bowel sounds are normal.   Musculoskeletal: Normal range of motion.   Neurological: He is alert and oriented to person, place, and time.   Skin: Skin is warm.   Psychiatric: He has a normal mood and affect.       Data Review:   ECG:     Assessment:      Cataract OD.    Plan:    CE OD.

## 2018-06-25 ENCOUNTER — ANESTHESIA EVENT (OUTPATIENT)
Dept: SURGERY | Facility: HOSPITAL | Age: 83
End: 2018-06-25
Payer: MEDICARE

## 2018-06-26 ENCOUNTER — HOSPITAL ENCOUNTER (OUTPATIENT)
Facility: HOSPITAL | Age: 83
Discharge: HOME | End: 2018-06-26
Attending: OPHTHALMOLOGY | Admitting: OPHTHALMOLOGY
Payer: MEDICARE

## 2018-06-26 ENCOUNTER — ANESTHESIA (OUTPATIENT)
Dept: SURGERY | Facility: HOSPITAL | Age: 83
End: 2018-06-26
Payer: MEDICARE

## 2018-06-26 VITALS
HEIGHT: 71 IN | BODY MASS INDEX: 30.1 KG/M2 | OXYGEN SATURATION: 98 % | RESPIRATION RATE: 18 BRPM | HEART RATE: 88 BPM | WEIGHT: 215 LBS | TEMPERATURE: 98 F | DIASTOLIC BLOOD PRESSURE: 80 MMHG | SYSTOLIC BLOOD PRESSURE: 158 MMHG

## 2018-06-26 DIAGNOSIS — H25.10 SENILE NUCLEAR SCLEROSIS: ICD-10-CM

## 2018-06-26 PROCEDURE — 94640 AIRWAY INHALATION TREATMENT: CPT

## 2018-06-26 PROCEDURE — 66984 XCAPSL CTRC RMVL W/O ECP: CPT | Mod: 79,RT,, | Performed by: OPHTHALMOLOGY

## 2018-06-26 PROCEDURE — 71000016 HC POSTOP RECOV ADDL HR: Performed by: OPHTHALMOLOGY

## 2018-06-26 PROCEDURE — V2632 POST CHMBR INTRAOCULAR LENS: HCPCS | Performed by: OPHTHALMOLOGY

## 2018-06-26 PROCEDURE — 25000242 PHARM REV CODE 250 ALT 637 W/ HCPCS: Performed by: OPHTHALMOLOGY

## 2018-06-26 PROCEDURE — 36000706: Performed by: OPHTHALMOLOGY

## 2018-06-26 PROCEDURE — 63600175 PHARM REV CODE 636 W HCPCS: Performed by: OPHTHALMOLOGY

## 2018-06-26 PROCEDURE — D9220A PRA ANESTHESIA: Mod: ANES,,, | Performed by: ANESTHESIOLOGY

## 2018-06-26 PROCEDURE — 25000003 PHARM REV CODE 250: Performed by: OPHTHALMOLOGY

## 2018-06-26 PROCEDURE — 71000015 HC POSTOP RECOV 1ST HR: Performed by: OPHTHALMOLOGY

## 2018-06-26 PROCEDURE — 27000221 HC OXYGEN, UP TO 24 HOURS

## 2018-06-26 PROCEDURE — 37000009 HC ANESTHESIA EA ADD 15 MINS: Performed by: OPHTHALMOLOGY

## 2018-06-26 PROCEDURE — 37000008 HC ANESTHESIA 1ST 15 MINUTES: Performed by: OPHTHALMOLOGY

## 2018-06-26 PROCEDURE — 94761 N-INVAS EAR/PLS OXIMETRY MLT: CPT

## 2018-06-26 PROCEDURE — 63600175 PHARM REV CODE 636 W HCPCS: Performed by: NURSE ANESTHETIST, CERTIFIED REGISTERED

## 2018-06-26 PROCEDURE — D9220A PRA ANESTHESIA: Mod: CRNA,,, | Performed by: NURSE ANESTHETIST, CERTIFIED REGISTERED

## 2018-06-26 PROCEDURE — 36000707: Performed by: OPHTHALMOLOGY

## 2018-06-26 DEVICE — LENS 24.0: Type: IMPLANTABLE DEVICE | Site: EYE | Status: FUNCTIONAL

## 2018-06-26 RX ORDER — LIDOCAINE HYDROCHLORIDE 40 MG/ML
INJECTION, SOLUTION RETROBULBAR
Status: DISCONTINUED
Start: 2018-06-26 | End: 2018-06-26 | Stop reason: HOSPADM

## 2018-06-26 RX ORDER — EPINEPHRINE 1 MG/ML
INJECTION, SOLUTION INTRACARDIAC; INTRAMUSCULAR; INTRAVENOUS; SUBCUTANEOUS
Status: DISCONTINUED
Start: 2018-06-26 | End: 2018-06-26 | Stop reason: HOSPADM

## 2018-06-26 RX ORDER — ALBUTEROL SULFATE 0.83 MG/ML
2.5 SOLUTION RESPIRATORY (INHALATION) EVERY 4 HOURS PRN
Status: DISCONTINUED | OUTPATIENT
Start: 2018-06-26 | End: 2018-06-26 | Stop reason: HOSPADM

## 2018-06-26 RX ORDER — CYCLOPENTOLATE HYDROCHLORIDE 10 MG/ML
1 SOLUTION/ DROPS OPHTHALMIC
Status: COMPLETED | OUTPATIENT
Start: 2018-06-26 | End: 2018-06-26

## 2018-06-26 RX ORDER — TROPICAMIDE 10 MG/ML
1 SOLUTION/ DROPS OPHTHALMIC
Status: DISCONTINUED | OUTPATIENT
Start: 2018-06-26 | End: 2018-06-26 | Stop reason: HOSPADM

## 2018-06-26 RX ORDER — EPINEPHRINE 1 MG/ML
INJECTION, SOLUTION INTRACARDIAC; INTRAMUSCULAR; INTRAVENOUS; SUBCUTANEOUS
Status: DISCONTINUED | OUTPATIENT
Start: 2018-06-26 | End: 2018-06-26 | Stop reason: HOSPADM

## 2018-06-26 RX ORDER — PROPARACAINE HYDROCHLORIDE 5 MG/ML
1 SOLUTION/ DROPS OPHTHALMIC
Status: DISCONTINUED | OUTPATIENT
Start: 2018-06-26 | End: 2018-06-26 | Stop reason: HOSPADM

## 2018-06-26 RX ORDER — ALBUTEROL SULFATE 0.83 MG/ML
SOLUTION RESPIRATORY (INHALATION)
Status: DISCONTINUED
Start: 2018-06-26 | End: 2018-06-26 | Stop reason: HOSPADM

## 2018-06-26 RX ORDER — LIDOCAINE HYDROCHLORIDE 40 MG/ML
INJECTION, SOLUTION RETROBULBAR
Status: DISCONTINUED | OUTPATIENT
Start: 2018-06-26 | End: 2018-06-26 | Stop reason: HOSPADM

## 2018-06-26 RX ORDER — OFLOXACIN 3 MG/ML
SOLUTION/ DROPS OPHTHALMIC
Status: DISCONTINUED | OUTPATIENT
Start: 2018-06-26 | End: 2018-06-26 | Stop reason: HOSPADM

## 2018-06-26 RX ORDER — SODIUM CHLORIDE 0.9 % (FLUSH) 0.9 %
3 SYRINGE (ML) INJECTION
Status: DISCONTINUED | OUTPATIENT
Start: 2018-06-26 | End: 2018-06-26 | Stop reason: HOSPADM

## 2018-06-26 RX ORDER — ONDANSETRON 2 MG/ML
INJECTION INTRAMUSCULAR; INTRAVENOUS
Status: DISCONTINUED | OUTPATIENT
Start: 2018-06-26 | End: 2018-06-26

## 2018-06-26 RX ORDER — HYDROCODONE BITARTRATE AND ACETAMINOPHEN 5; 325 MG/1; MG/1
1 TABLET ORAL EVERY 4 HOURS PRN
Status: DISCONTINUED | OUTPATIENT
Start: 2018-06-26 | End: 2018-06-26 | Stop reason: HOSPADM

## 2018-06-26 RX ORDER — OFLOXACIN 3 MG/ML
1 SOLUTION/ DROPS OPHTHALMIC
Status: COMPLETED | OUTPATIENT
Start: 2018-06-26 | End: 2018-06-26

## 2018-06-26 RX ORDER — PREDNISOLONE ACETATE 10 MG/ML
SUSPENSION/ DROPS OPHTHALMIC
Status: DISCONTINUED
Start: 2018-06-26 | End: 2018-06-26 | Stop reason: HOSPADM

## 2018-06-26 RX ORDER — SODIUM CHLORIDE 9 MG/ML
INJECTION, SOLUTION INTRAVENOUS CONTINUOUS
Status: DISCONTINUED | OUTPATIENT
Start: 2018-06-26 | End: 2018-06-26 | Stop reason: HOSPADM

## 2018-06-26 RX ORDER — ACETAMINOPHEN 325 MG/1
650 TABLET ORAL EVERY 4 HOURS PRN
Status: DISCONTINUED | OUTPATIENT
Start: 2018-06-26 | End: 2018-06-26 | Stop reason: HOSPADM

## 2018-06-26 RX ORDER — PHENYLEPHRINE HYDROCHLORIDE 25 MG/ML
1 SOLUTION/ DROPS OPHTHALMIC
Status: DISCONTINUED | OUTPATIENT
Start: 2018-06-26 | End: 2018-06-26 | Stop reason: HOSPADM

## 2018-06-26 RX ORDER — PREDNISOLONE ACETATE 10 MG/ML
SUSPENSION/ DROPS OPHTHALMIC
Status: DISCONTINUED | OUTPATIENT
Start: 2018-06-26 | End: 2018-06-26 | Stop reason: HOSPADM

## 2018-06-26 RX ADMIN — PHENYLEPHRINE HYDROCHLORIDE 1 DROP: 25 SOLUTION/ DROPS OPHTHALMIC at 12:06

## 2018-06-26 RX ADMIN — ALBUTEROL SULFATE 2.5 MG: 2.5 SOLUTION RESPIRATORY (INHALATION) at 02:06

## 2018-06-26 RX ADMIN — OFLOXACIN 1 DROP: 3 SOLUTION OPHTHALMIC at 12:06

## 2018-06-26 RX ADMIN — CYCLOPENTOLATE HYDROCHLORIDE 1 DROP: 10 SOLUTION/ DROPS OPHTHALMIC at 12:06

## 2018-06-26 RX ADMIN — TROPICAMIDE 1 DROP: 10 SOLUTION/ DROPS OPHTHALMIC at 12:06

## 2018-06-26 RX ADMIN — ONDANSETRON 4 MG: 2 INJECTION INTRAMUSCULAR; INTRAVENOUS at 01:06

## 2018-06-26 RX ADMIN — PROPARACAINE HYDROCHLORIDE 1 DROP: 5 SOLUTION/ DROPS OPHTHALMIC at 12:06

## 2018-06-26 RX ADMIN — SODIUM CHLORIDE 1000 ML: 0.9 INJECTION, SOLUTION INTRAVENOUS at 12:06

## 2018-06-26 NOTE — ANESTHESIA PREPROCEDURE EVALUATION
06/26/2018  Dennis Rees is a 83 y.o., male.    Pre-op Assessment         Review of Systems  Anesthesia Hx:  No problems with previous Anesthesia    Social:  Non-Smoker, No Alcohol Use    Hematology/Oncology:        Oncology Comments: Hx of Squamous cell carcinoma   Cardiovascular:   Hypertension Past MI CAD  Dysrhythmias (V-tach)  CHF hyperlipidemia EF= 43%   Pulmonary:   Asthma    Renal/:   BPH    Hepatic/GI:   GERD    Musculoskeletal:   Arthritis     Neurological:  Dementia moderate        Physical Exam  General:  Obesity    Airway/Jaw/Neck:  Airway Findings: Mouth Opening: Normal Tongue: Normal  General Airway Assessment: Adult            Mental Status:  Mental Status Findings:  Cooperative, Alert and Oriented         Anesthesia Plan  Type of Anesthesia, risks & benefits discussed:  Anesthesia Type:  MAC  Patient's Preference:   Intra-op Monitoring Plan: standard ASA monitors  Intra-op Monitoring Plan Comments:   Post Op Pain Control Plan:   Post Op Pain Control Plan Comments:   Induction:   IV  Beta Blocker:  Patient is on a Beta-Blocker and has received one dose within the past 24 hours (No further documentation required).       Informed Consent: Patient representative understands risks and agrees with Anesthesia plan.  Questions answered. Anesthesia consent signed with patient representative.  ASA Score: 3     Day of Surgery Review of History & Physical:    H&P update referred to the surgeon.         Ready For Surgery From Anesthesia Perspective.

## 2018-06-26 NOTE — DISCHARGE INSTRUCTIONS
PATIENT INSTRUCTIONS  POST-ANESTHESIA    IMMEDIATELY FOLLOWING SURGERY:  Do not drive or operate machinery for the first twenty four hours after surgery.  Do not make any important decisions for twenty four hours after surgery or while taking narcotic pain medications or sedatives.  If you develop intractable nausea and vomiting or a severe headache please notify your doctor immediately.    FOLLOW-UP:  Please make an appointment with your surgeon as instructed. You do not need to follow up with anesthesia unless specifically instructed to do so.    QUESTIONS?:  Please feel free to call your physician or the hospital  if you have any questions, and they will be happy to assist you.       [facility] Anesthesia Department  [address] [facility]  [phone]

## 2018-06-26 NOTE — TRANSFER OF CARE
"Anesthesia Transfer of Care Note    Patient: Dennis Rees    Procedure(s) Performed: Procedure(s) (LRB):  PHACOEMULSIFICATION, CATARACT (Right)  INSERTION, INTRAOCULAR LENS PROSTHESIS (Right)    Patient location: PACU    Anesthesia Type: MAC    Transport from OR: Transported from OR on room air with adequate spontaneous ventilation    Post pain: adequate analgesia    Post assessment: no apparent anesthetic complications and tolerated procedure well    Post vital signs: stable    Level of consciousness: awake    Nausea/Vomiting: no nausea/vomiting    Complications: none    Transfer of care protocol was followed      Last vitals:   Visit Vitals  BP (!) 154/81 (BP Location: Left arm, Patient Position: Lying)   Pulse 62   Temp 36.6 °C (97.9 °F) (Temporal)   Resp 17   Ht 5' 11" (1.803 m)   Wt 97.5 kg (215 lb)   SpO2 96%   BMI 29.99 kg/m²     "

## 2018-06-26 NOTE — PROGRESS NOTES
Patient arrived, nervous and anxious, grabbing at clothes, wires, wanting to get up, patient urinated per urinal after several attempts to understand patient due to dementia and speech. Patient stood at bedside to urinate, patient became short of breath and wheezing, patient sat back down and pulse ox read 84% on room air, romario silva called, nebulizer treatment ordered. Pt put on 2 lpm NC until nebulizer treatment, wife brought back, patient began to calm down once breathing was improved.

## 2018-06-26 NOTE — BRIEF OP NOTE
Operative Note     SUMMARY     Surgery Date: 6/26/2018    Surgeon(s) and Role:      Jose Richmond MD - Primary    Pre-op Diagnosis:  Nuclear sclerosis     Post-op/ Diagnosis:  Same    Final Diagnosis: Cataract    Procedure(s) (LRB):  PHACOEMULSIFICATION-ASPIRATION-CATARACT   INSERTION-INTRAOCULAR LENS (IOL)     Anesthesia: Monitored Anesthesia Care    Findings/Key Components:  Cataract    Outcome: Tolerated procedure well    Estimated Blood Loss: None         Specimens     None          Follow-up:  Tomorrow in clinic      Discharge Note      SUMMARY     Admit Date: 6/26/2018    Attending Physician: Jose Richmond MD    Discharge Physician: Jose Richmond MD    Discharge Date: 6/26/2018    Final Diagnosis: Cataract    Outcome: Tolerated procedure well    Disposition: Discharge to Home.    Medications:       Dennis Rees   Home Medication Instructions MATT:[id number]    Printed on:06/26/18 4935   Medication Information                      aspirin (ECOTRIN) 81 MG EC tablet  Take 81 mg by mouth nightly.              atorvastatin (LIPITOR) 40 MG tablet  Take 1 tablet (40 mg total) by mouth nightly.             carvedilol (COREG) 3.125 MG tablet  Take 1 tablet (3.125 mg total) by mouth 2 (two) times daily with meals.             cetirizine (ZYRTEC) 10 MG tablet  Take 10 mg by mouth every morning.              clobetasol (TEMOVATE) 0.05 % cream  Apply topically once. Apply once a day.             difluprednate (DUREZOL) 0.05 % Drop ophthalmic solution  Place 1 drop into the left eye 4 (four) times daily. For 30 days             dutasteride (AVODART) 0.5 mg capsule  Take 1 capsule (0.5 mg total) by mouth once daily.             fluticasone (FLONASE) 50 mcg/actuation nasal spray  INHALE ONE PUFF IN EACH NOSTRIL EVERY DAY             ILEVRO 0.3 % DrpS  Place 1 drop into both eyes once daily. For 30 days             losartan (COZAAR) 50 MG tablet  TAKE ONE TABLET BY MOUTH EVERY DAY              losartan (COZAAR) 50 MG tablet  Take 1 tablet (50 mg total) by mouth once daily.             omeprazole (PRILOSEC) 40 MG capsule  Take 1 capsule (40 mg total) by mouth 2 (two) times daily before meals.             PROAIR HFA 90 mcg/actuation inhaler  INHALE TWO PUFFS EVERY 6 HOURS AS NEEDED FOR WHEEZING             QVAR 40 mcg/actuation Aero  INHALE TWO PUFFS TWICE DAILY             QVAR REDIHALER 40 mcg/actuation HFAB  INHALE TWO puffs BY MOUTH TWICE DAILY                   Patient Discharge Instructions:     Keep Kang Shield over operated eye when not using drops.     DIET:  Regular    Activity: No heavy lifting or bending X 1 week.    Follow-up:  Tomorrow in clinic

## 2018-06-27 ENCOUNTER — OFFICE VISIT (OUTPATIENT)
Dept: OPHTHALMOLOGY | Facility: CLINIC | Age: 83
End: 2018-06-27
Payer: MEDICARE

## 2018-06-27 VITALS — HEART RATE: 66 BPM | SYSTOLIC BLOOD PRESSURE: 136 MMHG | DIASTOLIC BLOOD PRESSURE: 79 MMHG

## 2018-06-27 DIAGNOSIS — Z98.890 POST-OPERATIVE STATE: Primary | ICD-10-CM

## 2018-06-27 PROCEDURE — 99024 POSTOP FOLLOW-UP VISIT: CPT | Mod: POP,,, | Performed by: OPHTHALMOLOGY

## 2018-06-27 PROCEDURE — 99213 OFFICE O/P EST LOW 20 MIN: CPT | Mod: PBBFAC,PO | Performed by: OPHTHALMOLOGY

## 2018-06-27 PROCEDURE — 99999 PR PBB SHADOW E&M-EST. PATIENT-LVL III: CPT | Mod: PBBFAC,,, | Performed by: OPHTHALMOLOGY

## 2018-06-27 NOTE — PROGRESS NOTES
Subjective:       Patient ID: Dennis Rees is a 83 y.o. male.    Chief Complaint: Post-op Evaluation (1 day po od)    HPI     Post-op Evaluation    Additional comments: 1 day po od           Comments   S/p phaco w/IOL OD- 6/26/18    1 day po od    Pt states sx went well. Pt denies pain and discomfort.     Eyemeds  Ofloxacin QID OD  Ilevro QD OD  Durezol QID OD        Last edited by Mabel Barton on 6/27/2018  2:38 PM. (History)             Assessment:       1. Post-operative state        Plan:       S/p CE OU- Doing well.        CPM OD.  Taper gtts OS.  RTC 1 wk.

## 2018-06-27 NOTE — OP NOTE
DATE OF PROCEDURE:  06/26/2018    SURGEON:  Jose Richmond M.D.    PREOPERATIVE DIAGNOSIS:  Nuclear sclerotic cataract, right eye.    POSTOPERATIVE DIAGNOSIS:  Nuclear sclerotic cataract, right eye.    PROCEDURE:  Clear corneal phacoemulsification with posterior chamber intraocular   lens implant, right eye.    ANESTHESIA:  Monitored anesthesia care with 2% Xylocaine jelly topically, 1%   free lidocaine topically and intrachamberly.    PROCEDURE IN DETAIL:  After the appropriate preoperative consent was obtained,   the patient had the 2% Xylocaine jelly applied to the  cornea.  The patient was   then brought to the operating room and placed into the supine position.  The   right periorbit was then prepped and draped in the usual sterile ophthalmic   fashion.  A lid speculum was then inserted into the right  eye.  Several drops   of the 1% lidocaine were placed onto the right cornea.  The 1% lidocaine was   also applied to the perilimbal region with the Weck-sade sponge.  Using the   0.12-mm forceps and the Super Sharp blade, a paracentesis site was made at the   12 o'clock position.  Approximately 0.5 mL of the 1% lidocaine was injected into   the anterior chamber.  Next, Viscoat was injected into the anterior chamber   through the paracentesis site.  The 2.75-mm keratome and the cyclodialysis   spatula were used to create a 2.75-mm clear corneal temporal groove.  The   cystitomy needle and Utrata forceps were then used to create a continuous tear   360-degree capsulorrhexis.  BSS in a cannula was then used for hydrodissection.    Phacoemulsification then proceeded in a stop-and-chop fashion without any   complications.  Another 0.5 mL of the 1% lidocaine was injected into the   anterior chamber.  The curved tip irrigation aspiration handpiece was then used   to remove the residual cortical material from the capsular bag.  Again, the 1%   lidocaine was applied to the perilimbal region with the Weck-sade sponge.   Healon   GV was then injected into the anterior chamber and capsular bag.  An [employer] intraocular lens model SN60WF, 24.0 diopters in power, and serial   #[id number] was injected into the capsular bag with the lens injector.  The   Sinskey hook was used to position the lens into its proper place.  The residual   viscoelastic material was removed from the anterior chamber and capsular bag   with the curved tip irrigation aspiration handpiece.  Both wounds were hydrated   with BSS on a cannula.  Both wounds were checked and found to be watertight.    The lid speculum was then removed from the right eye.  The patient then had 1   drop of Vigamox ophthalmic drop and 1 drop of Econopred +1% ophthalmic drop   instilled onto the right cornea.  The eye was then shielded.  The patient   tolerated the procedure well and was then brought to the recovery room in good   condition.      EROS  dd: 06/26/2018 22:47:49 (CDT)  td: 06/26/2018 23:13:14 (CDT)  Doc ID   #[id number]  Job ID #[id number]    CC:

## 2018-06-28 ENCOUNTER — OFFICE VISIT (OUTPATIENT)
Dept: UROLOGY | Facility: CLINIC | Age: 83
End: 2018-06-28
Payer: MEDICARE

## 2018-06-28 ENCOUNTER — OFFICE VISIT (OUTPATIENT)
Dept: DERMATOLOGY | Facility: CLINIC | Age: 83
End: 2018-06-28
Payer: MEDICARE

## 2018-06-28 VITALS
WEIGHT: 217.81 LBS | DIASTOLIC BLOOD PRESSURE: 79 MMHG | SYSTOLIC BLOOD PRESSURE: 138 MMHG | HEART RATE: 68 BPM | HEIGHT: 71 IN | BODY MASS INDEX: 30.49 KG/M2

## 2018-06-28 DIAGNOSIS — L57.0 AK (ACTINIC KERATOSIS): ICD-10-CM

## 2018-06-28 DIAGNOSIS — L82.1 SK (SEBORRHEIC KERATOSIS): ICD-10-CM

## 2018-06-28 DIAGNOSIS — L28.0 LICHENOID DERMATITIS: ICD-10-CM

## 2018-06-28 DIAGNOSIS — L81.4 LENTIGINES: ICD-10-CM

## 2018-06-28 DIAGNOSIS — B35.1 ONYCHOMYCOSIS: ICD-10-CM

## 2018-06-28 DIAGNOSIS — N40.1 BPH WITH OBSTRUCTION/LOWER URINARY TRACT SYMPTOMS: Primary | ICD-10-CM

## 2018-06-28 DIAGNOSIS — D18.00 ANGIOMA: ICD-10-CM

## 2018-06-28 DIAGNOSIS — N13.8 BPH WITH OBSTRUCTION/LOWER URINARY TRACT SYMPTOMS: Primary | ICD-10-CM

## 2018-06-28 DIAGNOSIS — Z12.83 SKIN CANCER SCREENING: Primary | ICD-10-CM

## 2018-06-28 PROCEDURE — 17003 DESTRUCT PREMALG LES 2-14: CPT | Mod: PBBFAC | Performed by: DERMATOLOGY

## 2018-06-28 PROCEDURE — 51798 US URINE CAPACITY MEASURE: CPT | Mod: PBBFAC | Performed by: UROLOGY

## 2018-06-28 PROCEDURE — 17003 DESTRUCT PREMALG LES 2-14: CPT | Mod: S$PBB,,, | Performed by: DERMATOLOGY

## 2018-06-28 PROCEDURE — 99212 OFFICE O/P EST SF 10 MIN: CPT | Mod: PBBFAC,27 | Performed by: DERMATOLOGY

## 2018-06-28 PROCEDURE — 99213 OFFICE O/P EST LOW 20 MIN: CPT | Mod: S$PBB,,, | Performed by: UROLOGY

## 2018-06-28 PROCEDURE — 99999 PR PBB SHADOW E&M-EST. PATIENT-LVL II: CPT | Mod: PBBFAC,,, | Performed by: DERMATOLOGY

## 2018-06-28 PROCEDURE — 81002 URINALYSIS NONAUTO W/O SCOPE: CPT | Mod: PBBFAC | Performed by: UROLOGY

## 2018-06-28 PROCEDURE — 99999 PR PBB SHADOW E&M-EST. PATIENT-LVL III: CPT | Mod: PBBFAC,,, | Performed by: UROLOGY

## 2018-06-28 PROCEDURE — 17000 DESTRUCT PREMALG LESION: CPT | Mod: S$PBB,,, | Performed by: DERMATOLOGY

## 2018-06-28 PROCEDURE — 17000 DESTRUCT PREMALG LESION: CPT | Mod: PBBFAC | Performed by: DERMATOLOGY

## 2018-06-28 PROCEDURE — 99214 OFFICE O/P EST MOD 30 MIN: CPT | Mod: 25,S$PBB,, | Performed by: DERMATOLOGY

## 2018-06-28 PROCEDURE — 99213 OFFICE O/P EST LOW 20 MIN: CPT | Mod: PBBFAC,25 | Performed by: UROLOGY

## 2018-06-28 RX ORDER — ALCLOMETASONE DIPROPIONATE 0.5 MG/G
CREAM TOPICAL
Qty: 45 G | Refills: 1 | Status: SHIPPED | OUTPATIENT
Start: 2018-06-28

## 2018-06-28 RX ORDER — CICLOPIROX 80 MG/ML
SOLUTION TOPICAL
Qty: 1 BOTTLE | Refills: 5 | Status: SHIPPED | OUTPATIENT
Start: 2018-06-28

## 2018-06-28 RX ORDER — OFLOXACIN 3 MG/ML
1 SOLUTION/ DROPS OPHTHALMIC 4 TIMES DAILY
COMMUNITY
End: 2018-08-01

## 2018-06-28 NOTE — PROGRESS NOTES
Subjective:       Patient ID:  Dennis Rees is a 83 y.o. male who presents for   Chief Complaint   Patient presents with    Skin Check     UBSE     HPI  Pt has a hx of recurrent erythema on his glans penis which was biopsied by urology (11/14/16).  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  Has been prescribed many creams for this, including mycolog, lotrisone, clotrimazole, and nystatin powder.  Pt currently states it is asymptomatic. No similar lesions elsewhere on skin. No new medications prior to onset of rash. Pt's wife is interested in a cream he can use to treat it when it flares, because he does talk about it frequently.    Interested in upper body skin check today.  Patient has a history of non-melanoma skin cancer and AK's treated with cryo and PDT.  His wife has noticed some brown, rough lesions on his face since last visit. Asymptomatic and no prior treatment.    Pt's wife is also requesting a topical treatment she can use for his fungal toenails. Currently using OTC FungiNail with some improvement.    Review of Systems   Constitutional: Negative for fever, chills, weight loss, weight gain, fatigue, night sweats and malaise.   Skin: Positive for activity-related sunscreen use. Negative for daily sunscreen use and recent sunburn.   Hematologic/Lymphatic: Bruises/bleeds easily.        Objective:    Physical Exam   Constitutional: He appears well-developed and well-nourished. No distress.   Neurological: He is alert and oriented to person, place, and time. He is not disoriented.   Psychiatric: He has a normal mood and affect.   Skin:   Areas Examined (abnormalities noted in diagram):   Scalp / Hair Palpated and Inspected  Head / Face Inspection Performed  Neck Inspection Performed  Chest / Axilla Inspection Performed  Abdomen Inspection Performed  Genitals / Buttocks / Groin Inspection Performed  Back Inspection Performed  RUE Inspected  LUE Inspection Performed                       Diagram  Legend     Erythematous scaling macule/papule c/w actinic keratosis       Vascular papule c/w angioma      Pigmented verrucoid papule/plaque c/w seborrheic keratosis      Yellow umbilicated papule c/w sebaceous hyperplasia      Irregularly shaped tan macule c/w lentigo     1-2 mm smooth white papules consistent with Milia      Movable subcutaneous cyst with punctum c/w epidermal inclusion cyst      Subcutaneous movable cyst c/w pilar cyst      Firm pink to brown papule c/w dermatofibroma      Pedunculated fleshy papule(s) c/w skin tag(s)      Evenly pigmented macule c/w junctional nevus     Mildly variegated pigmented, slightly irregular-bordered macule c/w mildly atypical nevus      Flesh colored to evenly pigmented papule c/w intradermal nevus       Pink pearly papule/plaque c/w basal cell carcinoma      Erythematous hyperkeratotic cursted plaque c/w SCC      Surgical scar with no sign of skin cancer recurrence      Open and closed comedones      Inflammatory papules and pustules      Verrucoid papule consistent consistent with wart     Erythematous eczematous patches and plaques     Dystrophic onycholytic nail with subungual debris c/w onychomycosis     Umbilicated papule    Erythematous-base heme-crusted tan verrucoid plaque consistent with inflamed seborrheic keratosis     Erythematous Silvery Scaling Plaque c/w Psoriasis     See annotation      Assessment / Plan:        Skin cancer screening  Upper body skin examination performed today including at least 6 points as noted in physical examination. No lesions suspicious for malignancy noted.  Patient instructed in importance of daily broad spectrum sunscreen use with spf at least 30. Sun avoidance and topical protection/protective clothing discussed.    SK (seborrheic keratosis)  These are benign inherited growths without a malignant potential. Reassurance given to patient. No treatment is necessary.   Treatment of benign, asymptomatic lesions may be considered  cosmetic.  Warned about risk of hypo- or hyperpigmentation with treatment and risk of recurrence.    AK (actinic keratosis)  Cryosurgery Procedure Note    Verbal consent from the patient is obtained including, but not limited to, risk of hypopigmentation/hyperpigmentation, scar, recurrence of lesion. The patient is aware of the precancerous quality and need for treatment of these lesions. Liquid nitrogen cryosurgery is applied to the 6 actinic keratoses, as detailed in the physical exam, to produce a freeze injury. The patient is aware that blisters may form and is instructed on wound care with gentle cleansing and use of vaseline ointment to keep moist until healed. The patient is supplied a handout on cryosurgery and is instructed to call if lesions do not completely resolve.    Angioma  This is a benign vascular lesion. Reassurance given. No treatment required. Treatment of benign, asymptomatic lesions may be considered cosmetic.    Lichenoid dermatitis  -     alclomethasone (ACLOVATE) 0.05 % cream; AAA on penis BID as needed for redness. May use up to a week straight at a time.  Dispense: 45 g; Refill: 1    Onychomycosis  -     ciclopirox (PENLAC) 8 % Soln; Apply daily to affected nail. Must remove with rubbing alcohol once weekly and restart  Dispense: 1 Bottle; Refill: 5    Lentigines  These are benign sun spots which should be monitored for changes. Patient instructed in importance of daily broad spectrum sunscreen use with spf at least 30. Sun avoidance and topical protection/protective clothing discussed.      Follow-up in about 1 year (around 6/28/2019) for skin check or sooner for any concerns.

## 2018-06-28 NOTE — PATIENT INSTRUCTIONS
Summer Sun Protection      The [facility] would like to remind you of the importance of sun protection all year round and particularly during the summer when the suns rays are the strongest. It has been proven that both acute and chronic sun exposure damages our cells and leads to skin cancer. Beyond skin cancer, the sun causes 90% of the symptoms of pre-mature skin aging, including wrinkles, lentigines (brown spots), and thin, easily bruised skin. Proper sun protection can help prevent these unwanted conditions.    Many patients report that the dont go in the sun. It has been shown that the average person receives 18 hours of incidental sun exposure per week during activities such as walking through parking lots, driving, or sitting next to windows. This accumulates to several bad sunburns per year!    In choosing sunscreen, you want one that protects against both UVA and UVB rays. It is recommended that you use one of SPF 30 or higher. It is important to apply the sunscreen about 20 minutes prior to sun exposure. Most sunscreens are chemical sunscreens and a reaction must take place in the skin so that they are effective. If they are applied and then you are immediately exposed to the sun or start sweating, this reaction has not had time to take place and you are therefore unprotected. Sunscreen needs to be reapplied every 2 hours if you are participating in water sports or sweating. We recommend Elta MD or Neutrogena Ultra Sheer Dry Touch SPF 55 for daily use; however there are many options and it is most important for you to find one that you will use on a consistent basis.    If you have sensitive skin, you may do best with a sunscreen that contains only physical blockers such as titanium dioxide or zinc oxide. These are typically thicker and harder to apply, however they afford very good protection. Neutrogena Sensitive Skin, Blue Lizard Sensitive Skin (pink top) or Neutrogena Pure  and Free are popular ones.     Aside from sunscreen, clothes with UV protection, wide brimmed hats, and sunglasses are other means of sun protection that we recommend.                        [facility] - DERMATOLOGY  [address]  [place] [address]  Dept: [phone]  Dept Fax: [phone]                                                                               CRYOSURGERY      Your doctor has used a method called cryosurgery to treat your skin condition. Cryosurgery refers to the use of very cold substances to treat a variety of skin conditions such as warts, pre-skin cancers, molluscum contagiosum, sun spots, and several benign growths. The substance we use in cryosurgery is liquid nitrogen and is so cold (-195 degrees Celsius) that is burns when administered.     Following treatment in the office, the skin may immediately burn and become red. You may find the area around the lesion is affected as well. It is sometimes necessary to treat not only the lesion, but a small area of the surrounding normal skin to achieve a good response.     A blister, and even a blood filled blister, may form after treatment.   This is a normal response. If the blister is painful, it is acceptable to sterilize a needle and with rubbing alcohol and gently pop the blister. It is important that you gently wash the area with soap and warm water as the blister fluid may contain wart virus if a wart was treated. Do no remove the roof of the blister.     The area treated can take anywhere from 1-3 weeks to heal. Healing time depends on the kind skin lesion treated, the location, and how aggressively the lesion was treated. It is recommended that the areas treated are covered with Vaseline or bacitracin ointment and a band-aid. If a band-aid is not practical, just ointment applied several times per day will do. Keeping these areas moist will speed the healing time.    Treatment with liquid  nitrogen can leave a scar. In dark skin, it may be a light or dark scar, in light skin it may be a white or pink scar. These will generally fade with time, but may never go away completely.     If you have any concerns after your treatment, please feel free to call the office.       [facility] [place] [address]/ [phone] FAX/ [email or web address]

## 2018-06-28 NOTE — PROGRESS NOTES
CHIEF COMPLAINT:    Mr. Rees is a 83 y.o. male presenting for a follow up on LUTS.    PRESENTING ILLNESS:    Dennis Rees is a 83 y.o. male who returns for follow up.  He states he has nocturia x 3 but his wife states that he retires to bed around 10 pm and the first time he wakes is about 3 pm.  He states he can easily return to bed.  He recently had cataract surgery and wore a patch so she had to be cognizant of him getting in and out of bed.   He states he is not bothered by his symptoms.  He is status post Laser TURP in 2012.  He also has a history of meatal stenosis but uses clobetasol and a meatal dilator and this has remained open.  He denies any hesitancy.  Stream is strong.     REVIEW OF SYSTEMS:    Review of Systems   Constitutional: Negative.    HENT: Negative.    Eyes: Negative.    Respiratory: Negative.    Cardiovascular: Negative.    Gastrointestinal: Negative.    Genitourinary:        Nocturia   Musculoskeletal: Negative.    Skin: Negative.    Neurological: Positive for speech change (speech is slowed and patient word searches).   Endo/Heme/Allergies: Negative.    Psychiatric/Behavioral: Positive for memory loss.         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 nose and  back (NMSC), excised by     Squamous cell carcinoma 11/2014    right mid cheek, MOHS    Squamous cell carcinoma 11/2014    left mid cheek, MOHS       Past Surgical History:   Procedure Laterality Date    CARDIAC CATHETERIZATION  12/1/11    non obstructive    CATARACT EXTRACTION W/  INTRAOCULAR LENS IMPLANT Left 06/12/2018    Dr. Richmond    CIRCUMCISION, PRIMARY      HERNIA REPAIR      INTRAOCULAR PROSTHESES INSERTION Left 6/12/2018    Procedure: INSERTION, INTRAOCULAR LENS;  Surgeon: Jose Richmond MD;  Location: [facility] OR [address] [place] [address];  Service: Ophthalmology;  Laterality: Left;    INTRAOCULAR PROSTHESES INSERTION Right 6/26/2018    Procedure: INSERTION, INTRAOCULAR LENS PROSTHESIS;  Surgeon: Jose Richmond MD;  Location: [facility] OR [address] [place] [address];  Service: Ophthalmology;  Laterality: Right;    PHACOEMULSIFICATION OF CATARACT Left 6/12/2018    Procedure: PHACOEMULSIFICATION, CATARACT;  Surgeon: Jose Richmond MD;  Location: [facility] OR [address] [place] [address];  Service: Ophthalmology;  Laterality: Left;    PHACOEMULSIFICATION OF CATARACT Right 6/26/2018    Procedure: PHACOEMULSIFICATION, CATARACT;  Surgeon: Jose Richmond MD;  Location: [facility] OR [address] [place] [address];  Service: Ophthalmology;  Laterality: Right;    TONSILLECTOMY, ADENOIDECTOMY      TOTAL KNEE ARTHROPLASTY         Family History   Problem Relation Age of Onset    Stroke Mother     Diabetes Maternal Aunt      Social History    Marital status:      Social History Main Topics    Smoking status: Never Smoker    Smokeless tobacco: Never Used    Alcohol use No    Drug use: Unknown    Sexual activity: Not on file       Allergies:  Pcn [penicillins] and Sulfa (sulfonamide antibiotics)    Medications:  Outpatient Encounter Prescriptions as of 6/28/2018   Medication Sig Dispense Refill    aspirin (ECOTRIN) 81 MG EC tablet Take 81 mg by mouth nightly.       atorvastatin (LIPITOR) 40 MG tablet Take 1 tablet (40 mg total) by mouth nightly.  90 tablet 3    carvedilol (COREG) 3.125 MG tablet Take 1 tablet (3.125 mg total) by mouth 2 (two) times daily with meals. 180 tablet 3    cetirizine (ZYRTEC) 10 MG tablet Take 10 mg by mouth every morning.       clobetasol (TEMOVATE) 0.05 % cream Apply topically once. Apply once a day. (Patient taking differently: Apply topically twice a week. Apply to tip of Urethra 1-2 times per week) 30 g 5    difluprednate (DUREZOL) 0.05 % Drop ophthalmic solution Place 1 drop into the left eye 4 (four) times daily. For 30 days 5 mL 1    dutasteride (AVODART) 0.5 mg capsule Take 1 capsule (0.5 mg total) by mouth once daily. (Patient taking differently: Take 0.5 mg by mouth every morning. Take 1 capsule (0.5 mg total) by mouth once daily.) 30 capsule 11    fluticasone (FLONASE) 50 mcg/actuation nasal spray INHALE ONE PUFF IN EACH NOSTRIL EVERY DAY 16 g 3    ILEVRO 0.3 % DrpS Place 1 drop into both eyes once daily. For 30 days (Patient taking differently: Place 1 drop into both eyes nightly. For 30 days) 3 mL 1    losartan (COZAAR) 50 MG tablet TAKE ONE TABLET BY MOUTH EVERY DAY 90 tablet 3    losartan (COZAAR) 50 MG tablet Take 1 tablet (50 mg total) by mouth once daily. 90 tablet 3    ofloxacin (OCUFLOX) 0.3 % ophthalmic solution Place 1 drop into the right eye 4 (four) times daily.      omeprazole (PRILOSEC) 40 MG capsule Take 1 capsule (40 mg total) by mouth 2 (two) times daily before meals. 180 capsule 3    PROAIR HFA 90 mcg/actuation inhaler INHALE TWO PUFFS EVERY 6 HOURS AS NEEDED FOR WHEEZING 8.5 g 6    QVAR 40 mcg/actuation Aero INHALE TWO PUFFS TWICE DAILY 8.7 g 3    QVAR REDIHALER 40 mcg/actuation HFAB INHALE TWO puffs BY MOUTH TWICE DAILY 10.6 g 3    alclomethasone (ACLOVATE) 0.05 % cream AAA on penis BID as needed for redness. May use up to a week straight at a time. 45 g 1    ciclopirox (PENLAC) 8 % Soln Apply daily to affected nail. Must remove with rubbing alcohol once weekly and restart 1 Bottle 5      No facility-administered encounter medications on file as of 6/28/2018.          PHYSICAL EXAMINATION:    The patient generally appears in good health, is appropriately interactive, and is in no apparent distress.    Skin: No lesions.    Mental: Cooperative with normal affect.    Neuro: Grossly intact.    HEENT: Normal. No evidence of lymphadenopathy.    Chest:  normal inspiratory effort.    Abdomen: Soft, non-tender. No masses or organomegaly. Bladder is not palpable. No evidence of flank discomfort. No evidence of inguinal hernia.    Extremities: No clubbing, cyanosis, or edema    PVR by bladder scan was 328 ml.      LABS:    Lab Results   Component Value Date    BUN 14 06/01/2018    CREATININE 1.3 06/01/2018     UA 1.010, pH 6, otherwise, negative    IMPRESSION:    Encounter Diagnoses   Name Primary?    BPH with obstruction/lower urinary tract symptoms Yes       PLAN:    1.  The patient states that he is not bothered by his symptoms.  So will recheck the BMP in 3 months.    2.  AUA symptom score given for him to do at home (so he is not pressured by time constraints in the clinic)  And she will mail to me next week.  Will repeat another in 3 months and bring to the appointment so we have something to compare  3.  If he continues is more bothered by symptoms, then will plan SUDS/Cysto.  We discussed that if he becomes bothered or changes his mind, that they can call and I will schedule for the testing.   4.  Follow up in 3 months if he remains un bothered by his symptoms.

## 2018-07-05 ENCOUNTER — OFFICE VISIT (OUTPATIENT)
Dept: OPTOMETRY | Facility: CLINIC | Age: 83
End: 2018-07-05
Payer: MEDICARE

## 2018-07-05 DIAGNOSIS — Z98.890 POST-OPERATIVE STATE: Primary | ICD-10-CM

## 2018-07-05 PROCEDURE — 99024 POSTOP FOLLOW-UP VISIT: CPT | Mod: POP,,, | Performed by: OPTOMETRIST

## 2018-07-05 PROCEDURE — 99999 PR PBB SHADOW E&M-EST. PATIENT-LVL II: CPT | Mod: PBBFAC,,, | Performed by: OPTOMETRIST

## 2018-07-05 PROCEDURE — 99212 OFFICE O/P EST SF 10 MIN: CPT | Mod: PBBFAC,PO | Performed by: OPTOMETRIST

## 2018-07-05 NOTE — PROGRESS NOTES
HPI     Doing well with post op cataract surgery OD most recent  Doing drop taper as directed per caregiver  No red eye  No eye pain  No discharge    Last edited by Jose Menchaca, OD on 7/5/2018 10:05 AM. (History)            Assessment /Plan     For exam results, see Encounter Report.    Post-operative state      1. IOP fine, vision at 20/40, no sign of infection, cont drop taper as directed. [facility] villegast.

## 2018-07-09 ENCOUNTER — TELEPHONE (OUTPATIENT)
Dept: OPHTHALMOLOGY | Facility: CLINIC | Age: 83
End: 2018-07-09

## 2018-07-10 ENCOUNTER — OFFICE VISIT (OUTPATIENT)
Dept: OPTOMETRY | Facility: CLINIC | Age: 83
End: 2018-07-10
Payer: MEDICARE

## 2018-07-10 ENCOUNTER — TELEPHONE (OUTPATIENT)
Dept: OPTOMETRY | Facility: CLINIC | Age: 83
End: 2018-07-10

## 2018-07-10 DIAGNOSIS — Z96.1 PSEUDOPHAKIA OF BOTH EYES: ICD-10-CM

## 2018-07-10 DIAGNOSIS — H01.02A SQUAMOUS BLEPHARITIS OF UPPER AND LOWER EYELIDS OF BOTH EYES: Primary | ICD-10-CM

## 2018-07-10 DIAGNOSIS — H34.8112 STABLE CENTRAL RETINAL VEIN OCCLUSION OF RIGHT EYE: ICD-10-CM

## 2018-07-10 DIAGNOSIS — H04.123 BILATERAL DRY EYES: ICD-10-CM

## 2018-07-10 DIAGNOSIS — H01.02B SQUAMOUS BLEPHARITIS OF UPPER AND LOWER EYELIDS OF BOTH EYES: Primary | ICD-10-CM

## 2018-07-10 PROCEDURE — 92014 COMPRE OPH EXAM EST PT 1/>: CPT | Mod: S$PBB,,, | Performed by: OPTOMETRIST

## 2018-07-10 PROCEDURE — 99999 PR PBB SHADOW E&M-EST. PATIENT-LVL II: CPT | Mod: PBBFAC,,, | Performed by: OPTOMETRIST

## 2018-07-10 PROCEDURE — 99212 OFFICE O/P EST SF 10 MIN: CPT | Mod: PBBFAC,PO | Performed by: OPTOMETRIST

## 2018-07-10 NOTE — PROGRESS NOTES
HPI     YING 07/2018 OD blurred off and on since yesterday.  Sometimes clears with   blinking. Eyes water a lot. Using durezol and illevro once a day OD.   CE/IOL OD 06/26/2018.  Some buildup on eyelashes    Last edited by Jose Menchaca, OD on 7/10/2018  1:58 PM. (History)            Assessment /Plan     For exam results, see Encounter Report.    Squamous blepharitis of upper and lower eyelids of both eyes    Bilateral dry eyes    Pseudophakia of both eyes    Stable central retinal vein occlusion of right eye      1,2. Causing some blur with blink. Start Lid scrubs with baby shampoo and OK for art tears in between med drop taper. RTC with Jyoti as scheduled.   3. Monitor condition. Patient to report any changes. RTC 1 year recheck.  4. [facility] appt.

## 2018-07-19 ENCOUNTER — PROCEDURE VISIT (OUTPATIENT)
Dept: OPHTHALMOLOGY | Facility: CLINIC | Age: 83
End: 2018-07-19
Payer: MEDICARE

## 2018-07-19 VITALS — DIASTOLIC BLOOD PRESSURE: 79 MMHG | SYSTOLIC BLOOD PRESSURE: 142 MMHG | HEART RATE: 65 BPM

## 2018-07-19 DIAGNOSIS — H34.8110 CENTRAL RETINAL VEIN OCCLUSION WITH MACULAR EDEMA OF RIGHT EYE: Primary | ICD-10-CM

## 2018-07-19 DIAGNOSIS — H35.033 HYPERTENSIVE RETINOPATHY OF BOTH EYES: ICD-10-CM

## 2018-07-19 DIAGNOSIS — H35.351 CYSTOID MACULAR EDEMA, RIGHT EYE: ICD-10-CM

## 2018-07-19 PROCEDURE — C9257 BEVACIZUMAB INJECTION: HCPCS | Mod: PBBFAC,JG,PO | Performed by: OPHTHALMOLOGY

## 2018-07-19 PROCEDURE — 99499 UNLISTED E&M SERVICE: CPT | Mod: S$PBB,,, | Performed by: OPHTHALMOLOGY

## 2018-07-19 PROCEDURE — 67028 INJECTION EYE DRUG: CPT | Mod: PBBFAC,PO,RT | Performed by: OPHTHALMOLOGY

## 2018-07-19 PROCEDURE — 67028 INJECTION EYE DRUG: CPT | Mod: 79,S$PBB,RT, | Performed by: OPHTHALMOLOGY

## 2018-07-19 RX ORDER — NEPAFENAC 3 MG/ML
SUSPENSION/ DROPS OPHTHALMIC
COMMUNITY
Start: 2018-07-17 | End: 2018-08-01

## 2018-07-19 RX ADMIN — BEVACIZUMAB 1.25 MG: 100 INJECTION, SOLUTION INTRAVENOUS at 11:07

## 2018-07-19 NOTE — PATIENT INSTRUCTIONS
POST INTRAVITREAL INJECTION PATIENT INSTRUCTIONS   Below are some guidelines for what to expect after your treatment and additional care instructions.   * you may experience mild discomfort in your eye after the treatment. Your eye usually feels better within 24 to 48 hours after the procedure.   * you have been given drops that numb the surface of your eye.   DO NOT rub or touch your eye, DO NOT wear contacts until numbness goes away.   * you may experience small spots that appear in your field of vision, these are usually caused by an air bubble or from the medication. It taked a few hours or days for these to go away.   * use of sunglasses will help reduce light sensitivity and glare.   * DO NOT swim or put sink water ( tap water ) or swin for at least 24 hours after the injection   * If you have a gritty, burning, irritating or stinging feeling in the injected eye. This may be a result of the antiseptic used. Use artifical tears or eye lubricant ( from over- the- counter from your local pharmacy ). If you have some at home already please check the expiration date, so not to use anything contaminated in your eye. A cool pack over your eye brow above the injected eye may also relieve discomfort.   Call us right away or go to the Emergency Department if you have a dramatic decrease in vision or extreme pain in the eye.   [facility] [phone]

## 2018-07-19 NOTE — PROGRESS NOTES
HPI     Eye Problem    Additional comments: 6 week check           Comments   DLS 6/14/18- He was having trouble with eyes tearing a lot. Has improved   since doing lid scrubs as ATs. He had cataract sx OD since last visit    Ilevro daily OD  Durezol daily OD  ATs prn OU      Prior FP - OD - swollen ONH with heme  OS - ONH normal - with small cup    OCT - CME OD increased  OS No ME      A/P    1. CRVO OD with CME  VMT component  S/p Avastin OD x 13    12/15 - increase fluid at 8 weeks - resumed 5-6 week tx  8/16 - no CME, try observation  11/16 only small CME - continue to watch  5/18 - increased CME  6/18 - CME improving    Avastin OD    2.. AION OD  HTN with disc at risk  May get some vision improvement as disc swelling subsides, but pt aware of poor prognosis      3. HTN Ret OU  BP control    4. PCIOL OU  Great  result  Ok to stop Ilevro/durezol    5-6 weeks OCT    Risks, benefits, and alternatives to treatment discussed in detail with the patient.  The patient voiced understanding and wished to proceed with the procedure    Injection Procedure Note:  Diagnosis: CRVO with CME OD    Topical Proparacaine and Betadine.  Inject Avastin OD at 6:00 @ 3.5-4mm posterior to limbus  Post Operative Dx: Same  Complications: None  Follow up as above.

## 2018-08-01 ENCOUNTER — OFFICE VISIT (OUTPATIENT)
Dept: OPHTHALMOLOGY | Facility: CLINIC | Age: 83
End: 2018-08-01
Payer: MEDICARE

## 2018-08-01 DIAGNOSIS — Z98.890 POST-OPERATIVE STATE: Primary | ICD-10-CM

## 2018-08-01 DIAGNOSIS — H52.7 REFRACTIVE ERROR: ICD-10-CM

## 2018-08-01 DIAGNOSIS — H34.8110 CENTRAL RETINAL VEIN OCCLUSION WITH MACULAR EDEMA OF RIGHT EYE: ICD-10-CM

## 2018-08-01 PROCEDURE — 99212 OFFICE O/P EST SF 10 MIN: CPT | Mod: PBBFAC,PO | Performed by: OPHTHALMOLOGY

## 2018-08-01 PROCEDURE — 99024 POSTOP FOLLOW-UP VISIT: CPT | Mod: POP,,, | Performed by: OPHTHALMOLOGY

## 2018-08-01 PROCEDURE — 99999 PR PBB SHADOW E&M-EST. PATIENT-LVL II: CPT | Mod: PBBFAC,,, | Performed by: OPHTHALMOLOGY

## 2018-08-01 NOTE — PROGRESS NOTES
Subjective:       Patient ID: Dennis Rees is a 83 y.o. male.    Chief Complaint: Post-op Evaluation (3 weeks po ou)    HPI     Post-op Evaluation    Additional comments: 3 weeks po ou           Comments   DSL- 7/10/18     Pt is here for 3 weeks po. Pt has dry eyes and floaters. Pt denies pain   and discomfort.     Eyemeds  At's OU PRN        Last edited by Mabel Barton on 8/1/2018 11:22 AM. (History)             Assessment:       1. Post-operative state    2. Central retinal vein occlusion with macular edema of right eye    3. Refractive error        Plan:       S/p CE OU- Doing well.  CRVO with CME OD-Being treated by Dr Collado.  RE-Doing well with readers.        Readers.  RTC Dr Collado as scheduled in 4 wks.  RTC Dr Baez in 1 yr.

## 2018-08-17 ENCOUNTER — TELEPHONE (OUTPATIENT)
Dept: OPHTHALMOLOGY | Facility: CLINIC | Age: 83
End: 2018-08-17

## 2018-09-06 ENCOUNTER — PROCEDURE VISIT (OUTPATIENT)
Dept: OPHTHALMOLOGY | Facility: CLINIC | Age: 83
End: 2018-09-06
Payer: MEDICARE

## 2018-09-06 DIAGNOSIS — H34.8110 CENTRAL RETINAL VEIN OCCLUSION WITH MACULAR EDEMA OF RIGHT EYE: Primary | ICD-10-CM

## 2018-09-06 DIAGNOSIS — H35.033 HYPERTENSIVE RETINOPATHY OF BOTH EYES: ICD-10-CM

## 2018-09-06 PROCEDURE — 67028 INJECTION EYE DRUG: CPT | Mod: PBBFAC,PO,RT | Performed by: OPHTHALMOLOGY

## 2018-09-06 PROCEDURE — 92134 CPTRZ OPH DX IMG PST SGM RTA: CPT | Mod: PBBFAC,PO | Performed by: OPHTHALMOLOGY

## 2018-09-06 PROCEDURE — C9257 BEVACIZUMAB INJECTION: HCPCS | Mod: PBBFAC,JG,PO | Performed by: OPHTHALMOLOGY

## 2018-09-06 PROCEDURE — 67028 INJECTION EYE DRUG: CPT | Mod: S$PBB,79,RT, | Performed by: OPHTHALMOLOGY

## 2018-09-06 PROCEDURE — 92014 COMPRE OPH EXAM EST PT 1/>: CPT | Mod: 25,S$PBB,, | Performed by: OPHTHALMOLOGY

## 2018-09-06 RX ADMIN — BEVACIZUMAB 1.25 MG: 100 INJECTION, SOLUTION INTRAVENOUS at 11:09

## 2018-09-06 NOTE — PROGRESS NOTES
HPI     6 week / OCT / Avastin   DLS - 07/19/2018 Dr. Collado     Pt sts no change in va since last visit but eyes continue tearing.   (-)Flashes (+)Floaters  (-)Photophobia  (-)Glare    ATs prn OU      Prior FP - OD - swollen ONH with heme  OS - ONH normal - with small cup    OCT - CME OD increased  OS No ME      A/P    1. CRVO OD with CME  VMT component  S/p Avastin OD x 13    12/15 - increase fluid at 8 weeks - resumed 5-6 week tx  8/16 - no CME, try observation  11/16 only small CME - continue to watch  5/18 - increased CME  6/18 - CME improving  9/18 - slight increase at 7-8 weeks    Avastin OD  Get approved for ozurdex  Check FA for focal leakage    2.. AION OD  HTN with disc at risk  May get some vision improvement as disc swelling subsides, but pt aware of poor prognosis      3. HTN Ret OU  BP control    4. PCIOL OU  Great  result      5-6 weeks OCT    Risks, benefits, and alternatives to treatment discussed in detail with the patient.  The patient voiced understanding and wished to proceed with the procedure    Injection Procedure Note:  Diagnosis: CRVO with CME OD    Topical Proparacaine and Betadine.  Inject Avastin OD at 6:00 @ 3.5-4mm posterior to limbus  Post Operative Dx: Same  Complications: None  Follow up as above.

## 2018-09-06 NOTE — PATIENT INSTRUCTIONS
POST INTRAVITREAL INJECTION PATIENT INSTRUCTIONS   Below are some guidelines for what to expect after your treatment and additional care instructions.   * you may experience mild discomfort in your eye after the treatment. Your eye usually feels better within 24 to 48 hours after the procedure.   * you have been given drops that numb the surface of your eye.   DO NOT rub or touch your eye, DO NOT wear contacts until numbness goes away.   * you may experience small spots that appear in your field of vision, these are usually caused by an air bubble or from the medication. It taked a few hours or days for these to go away.   * use of sunglasses will help reduce light sensitivity and glare.   * DO NOT swim or put sink water ( tap water ) or swin for at least 24 hours after the injection   * If you have a gritty, burning, irritating or stinging feeling in the injected eye. This may be a result of the antiseptic used. Use artifical tears or eye lubricant ( from over- the- counter from your local pharmacy ). If you have some at home already please check the expiration date, so not to use anything contaminated in your eye. A cool pack over your eye brow above the injected eye may also relieve discomfort.   Call us right away or go to the Emergency Department if you have a dramatic decrease in vision or extreme pain in the eye.   [facility] [phone]

## 2018-09-24 ENCOUNTER — LAB VISIT (OUTPATIENT)
Dept: LAB | Facility: HOSPITAL | Age: 83
End: 2018-09-24
Attending: UROLOGY
Payer: MEDICARE

## 2018-09-24 DIAGNOSIS — N40.1 BPH WITH OBSTRUCTION/LOWER URINARY TRACT SYMPTOMS: ICD-10-CM

## 2018-09-24 DIAGNOSIS — N13.8 BPH WITH OBSTRUCTION/LOWER URINARY TRACT SYMPTOMS: ICD-10-CM

## 2018-09-24 LAB
ANION GAP SERPL CALC-SCNC: 6 MMOL/L
BUN SERPL-MCNC: 12 MG/DL
CALCIUM SERPL-MCNC: 9.6 MG/DL
CHLORIDE SERPL-SCNC: 106 MMOL/L
CO2 SERPL-SCNC: 27 MMOL/L
CREAT SERPL-MCNC: 1 MG/DL
EST. GFR  (AFRICAN AMERICAN): >60 ML/MIN/1.73 M^2
EST. GFR  (NON AFRICAN AMERICAN): >60 ML/MIN/1.73 M^2
GLUCOSE SERPL-MCNC: 84 MG/DL
POTASSIUM SERPL-SCNC: 4.7 MMOL/L
SODIUM SERPL-SCNC: 139 MMOL/L

## 2018-09-24 PROCEDURE — 80048 BASIC METABOLIC PNL TOTAL CA: CPT

## 2018-09-24 PROCEDURE — 36415 COLL VENOUS BLD VENIPUNCTURE: CPT

## 2018-09-28 ENCOUNTER — OFFICE VISIT (OUTPATIENT)
Dept: UROLOGY | Facility: CLINIC | Age: 83
End: 2018-09-28
Payer: MEDICARE

## 2018-09-28 VITALS
WEIGHT: 211 LBS | HEIGHT: 71 IN | BODY MASS INDEX: 29.54 KG/M2 | SYSTOLIC BLOOD PRESSURE: 148 MMHG | HEART RATE: 68 BPM | DIASTOLIC BLOOD PRESSURE: 80 MMHG

## 2018-09-28 DIAGNOSIS — R35.1 NOCTURIA: ICD-10-CM

## 2018-09-28 PROBLEM — Z98.890 POST-OPERATIVE STATE: Status: RESOLVED | Noted: 2018-06-13 | Resolved: 2018-09-28

## 2018-09-28 PROCEDURE — 99999 PR PBB SHADOW E&M-EST. PATIENT-LVL III: CPT | Mod: PBBFAC,,, | Performed by: UROLOGY

## 2018-09-28 PROCEDURE — 99213 OFFICE O/P EST LOW 20 MIN: CPT | Mod: S$PBB,,, | Performed by: UROLOGY

## 2018-09-28 PROCEDURE — 99213 OFFICE O/P EST LOW 20 MIN: CPT | Mod: PBBFAC | Performed by: UROLOGY

## 2018-09-28 NOTE — PROGRESS NOTES
CHIEF COMPLAINT:    Mr. Rees is a 83 y.o. male presenting for a follow up on nocturia    PRESENTING ILLNESS:    Dennis Rees is a 83 y.o. male who is accompanied by his wife.  His dementia has progressed.  He has increased difficulty with word searching.  He stated several times that he has a discharge from his eyes (he is being treated for dry eyes and has an appointment  Next week with a retina specialist.      His wife states that he goes to bed around 9-10 and watches TV and then goes to sleep around 10-11 then wakes about 3 am.  He starts his day between 7 and 7:30. His wife helped him with an AUA symptom score and it puts him at about 11-12 which would put him in the moderate range.      REVIEW OF SYSTEMS:    Review of Systems   Constitutional: Negative.    Eyes: Positive for discharge.   Respiratory: Negative.    Cardiovascular: Negative.    Gastrointestinal: Negative.    Genitourinary:        Nocturia   Musculoskeletal: Negative.    Skin: Negative.    Neurological: Negative.    Endo/Heme/Allergies: Negative.    Psychiatric/Behavioral: Positive for memory loss.     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 nose and back (NMSC), excised by     Squamous cell carcinoma  11/2014    right mid cheek, MOHS    Squamous cell carcinoma 11/2014    left mid cheek, MOHS       Past Surgical History:   Procedure Laterality Date    CARDIAC CATHETERIZATION  12/1/11    non obstructive    CATARACT EXTRACTION W/  INTRAOCULAR LENS IMPLANT Left 06/12/2018    Dr. Richmond    CIRCUMCISION, PRIMARY      COLONOSCOPY N/A 3/21/2014    Performed by Johnnie Madison MD at [facility] ENDO (4TH FLR)    HERNIA REPAIR      INSERTION, INTRAOCULAR LENS Left 6/12/2018    [facility]    INSERTION, INTRAOCULAR LENS PROSTHESIS Right 6/26/2018    Per[facility] by Jose [facility] at [facility] OR [facility]    INTRAOCULAR PROSTHESES INSERTION Left 6/12/2018    Procedure: INSERTION, INTRAOCULAR LENS;  Surgeon: Jose Richmond MD;  Location: [facility] OR [address] [place] [address];  Service: Ophthalmology;  Laterality: Left;    INTRAOCULAR PROSTHESES INSERTION Right 6/26/2018    Procedure: INSERTION, INTRAOCULAR LENS PROSTHESIS;  Surgeon: Jose Richmond MD;  Location: [facility] OR [address] [place] [address];  Service: Ophthalmology;  Laterality: Right;    PHACOEMULSIFICATION OF CATARACT Left 6/12/2018    Procedure: PHACOEMULSIFICATION, CATARACT;  Surgeon: Jose Richmond MD;  Location: [facility] OR [address] [place] [address];  Service: Ophthalmology;  Laterality: Left;    PHACOEMULSIFICATION OF CATARACT Right 6/26/2018    Procedure: PHACOEMULSIFICATION, CATARACT;  Surgeon: Jose Richmond MD;  Location: [facility] OR [address] [place] [address];  Service: Ophthalmology;  Laterality: Right;    PHACOEMULSIFICATION, CATARACT Right 6/26/2018    Performed by Jose Richmond MD at [facility] OR [address] [place] [address]    PHACOEMULSIFICATION, CATARACT Left 6/12/2018    Performed by Jose Richmond MD at [facility] OR [address] [place] [address]    TONSILLECTOMY, ADENOIDECTOMY      TOTAL KNEE ARTHROPLASTY         Family History   Problem Relation Age of Onset    Stroke Mother     Diabetes Maternal Aunt      Socioeconomic History    Marital status:    Occupational  History     Employer: OTHER   Tobacco Use    Smoking status: Never Smoker    Smokeless tobacco: Never Used   Substance and Sexual Activity    Alcohol use: No    Drug use: Not on file    Sexual activity: Not on file       Allergies:  Pcn [penicillins] and Sulfa (sulfonamide antibiotics)    Medications:  Outpatient Encounter Medications as of 9/28/2018   Medication Sig Dispense Refill    alclomethasone (ACLOVATE) 0.05 % cream AAA on penis BID as needed for redness. May use up to a week straight at a time. 45 g 1    aspirin (ECOTRIN) 81 MG EC tablet Take 81 mg by mouth nightly.       atorvastatin (LIPITOR) 40 MG tablet Take 1 tablet (40 mg total) by mouth nightly. 90 tablet 3    carvedilol (COREG) 3.125 MG tablet Take 1 tablet (3.125 mg total) by mouth 2 (two) times daily with meals. 180 tablet 3    cetirizine (ZYRTEC) 10 MG tablet Take 10 mg by mouth every morning.       ciclopirox (PENLAC) 8 % Soln Apply daily to affected nail. Must remove with rubbing alcohol once weekly and restart 1 Bottle 5    clobetasol (TEMOVATE) 0.05 % cream Apply topically once. Apply once a day. (Patient taking differently: Apply topically twice a week. Apply to tip of Urethra 1-2 times per week) 30 g 5    dutasteride (AVODART) 0.5 mg capsule Take 1 capsule (0.5 mg total) by mouth once daily. (Patient taking differently: Take 0.5 mg by mouth every morning. Take 1 capsule (0.5 mg total) by mouth once daily.) 30 capsule 11    fluticasone (FLONASE) 50 mcg/actuation nasal spray INHALE ONE PUFF IN EACH NOSTRIL EVERY DAY 16 g 3    losartan (COZAAR) 50 MG tablet TAKE ONE TABLET BY MOUTH EVERY DAY 90 tablet 3    losartan (COZAAR) 50 MG tablet Take 1 tablet (50 mg total) by mouth once daily. 90 tablet 3    omeprazole (PRILOSEC) 40 MG capsule Take 1 capsule (40 mg total) by mouth 2 (two) times daily before meals. 180 capsule 3    PROAIR HFA 90 mcg/actuation inhaler INHALE TWO PUFFS EVERY 6 HOURS AS NEEDED FOR WHEEZING 8.5 g 6     QVAR 40 mcg/actuation Aero INHALE TWO PUFFS TWICE DAILY 8.7 g 3    QVAR REDIHALER 40 mcg/actuation HFAB INHALE TWO puffs BY MOUTH TWICE DAILY 10.6 g 3     No facility-administered encounter medications on file as of 9/28/2018.          PHYSICAL EXAMINATION:    The patient generally appears in good health, is appropriately interactive, and is in no apparent distress.    Skin: No lesions.    Mental: Cooperative with normal affect.    Neuro: Grossly intact.    HEENT: Normal. No evidence of lymphadenopathy.    Chest:  normal inspiratory effort.    Abdomen: Soft, non-tender. No masses or organomegaly. Bladder is not palpable. No evidence of flank discomfort. No evidence of inguinal hernia.    Extremities: No clubbing, cyanosis, or edema      LABS:    Lab Results   Component Value Date    BUN 12 09/24/2018    CREATININE 1.0 09/24/2018       IMPRESSION:    Encounter Diagnoses   Name Primary?    Nocturia        PLAN:    1. As this is a quality of life issue and he is good with how he is doing, will monitor and follow up in 6 months.  Sooner, if needed.

## 2018-10-11 ENCOUNTER — PROCEDURE VISIT (OUTPATIENT)
Dept: OPHTHALMOLOGY | Facility: CLINIC | Age: 83
End: 2018-10-11
Payer: MEDICARE

## 2018-10-11 VITALS — SYSTOLIC BLOOD PRESSURE: 130 MMHG | DIASTOLIC BLOOD PRESSURE: 79 MMHG | HEART RATE: 69 BPM

## 2018-10-11 DIAGNOSIS — H34.8110 CENTRAL RETINAL VEIN OCCLUSION WITH MACULAR EDEMA OF RIGHT EYE: Primary | ICD-10-CM

## 2018-10-11 DIAGNOSIS — H25.11 NS (NUCLEAR SCLEROSIS), RIGHT: ICD-10-CM

## 2018-10-11 DIAGNOSIS — H35.033 HYPERTENSIVE RETINOPATHY OF BOTH EYES: ICD-10-CM

## 2018-10-11 PROCEDURE — 67028 INJECTION EYE DRUG: CPT | Mod: S$PBB,RT,, | Performed by: OPHTHALMOLOGY

## 2018-10-11 PROCEDURE — 92134 CPTRZ OPH DX IMG PST SGM RTA: CPT | Mod: PBBFAC,PO | Performed by: OPHTHALMOLOGY

## 2018-10-11 PROCEDURE — 67028 INJECTION EYE DRUG: CPT | Mod: PBBFAC,PO,RT | Performed by: OPHTHALMOLOGY

## 2018-10-11 PROCEDURE — C9257 BEVACIZUMAB INJECTION: HCPCS | Mod: PBBFAC,JG,PO | Performed by: OPHTHALMOLOGY

## 2018-10-11 PROCEDURE — 92235 FLUORESCEIN ANGRPH MLTIFRAME: CPT | Mod: 50,PBBFAC,PO | Performed by: OPHTHALMOLOGY

## 2018-10-11 PROCEDURE — 92014 COMPRE OPH EXAM EST PT 1/>: CPT | Mod: 25,S$PBB,, | Performed by: OPHTHALMOLOGY

## 2018-10-11 RX ADMIN — BEVACIZUMAB 1.25 MG: 100 INJECTION, SOLUTION INTRAVENOUS at 11:10

## 2018-10-11 NOTE — PROGRESS NOTES
HPI     Concerns About Ocular Health      Additional comments: 1 mon CRVO OD chk              Comments         ATs prn OU        Prior FP - OD - swollen ONH with heme  OS - ONH normal - with small cup    OCT - CME OD increased  OS No ME    FA - central MA's OD with leakage  Non leaking MA's OS      A/P    1. CRVO OD with CME  VMT component  S/p Avastin OD x 14    12/15 - increase fluid at 8 weeks - resumed 5-6 week tx  8/16 - no CME, try observation  11/16 only small CME - continue to watch  5/18 - increased CME  6/18 - CME improving  9/18 - slight increase at 7-8 weeks    Avastin OD  Get approved for ozurdex    Plan Focal OD soon    2.. AION OD  HTN with disc at risk  May get some vision improvement as disc swelling subsides, but pt aware of poor prognosis      3. HTN Ret OU  BP control    4. PCIOL OU  Great  result      2-3 weeks Focal OD    Risks, benefits, and alternatives to treatment discussed in detail with the patient.  The patient voiced understanding and wished to proceed with the procedure    Injection Procedure Note:  Diagnosis: CRVO with ME OD    Patient Identified and Time Out complete  Topical Proparacaine and Betadine.  Inject Avastin OD at 6:00 @ 3.5-4mm posterior to limbus  Post Operative Dx: Same  Complications: None  Follow up as above.

## 2018-10-11 NOTE — PATIENT INSTRUCTIONS
Fluorescein Angiography     A fluorescein angiogram of the retina   Fluorescein angiography is an eye test. It is done to look at the back of your eye, including:  · The blood vessels in your eye  · The layer of tissue at the back of your eye (the retina)  · The center of your retina (the macula)  · The optic nerve  This test can diagnose diseases found in these areas. It can also diagnose other conditions that affect these areas. To do this test, a dye called fluorescein is shot (injected) into your arm. The dye goes into your bloodstream and up into the blood vessels in your eyes. A special camera is then used to take images (angiograms) of your eyes.  Getting ready for your test  Tell your healthcare provider if you:  · Are pregnant or think you may be pregnant  · Are breastfeeding  · Have a history of severe allergic reactions, including to X-ray dye or other medicines  · Have kidney problems  Tell your provider about any medicines you are taking. You may need to stop taking all or some of these before the test. This includes:  · All prescription medicines  · Over-the-counter medicines such as aspirin or ibuprofen  · Street drugs  · Herbs, vitamins, and other supplements  You should arrange for an adult family member or friend to drive you home after your test. Your vision will be blurry for up to 12 hours.  Follow any other instructions from your healthcare provider.  During your test  · You are given eye drops to enlarge (dilate) your pupils.  · You then sit in front of a special camera. You place your chin on the chin rest and look into the camera.  · Images are taken of your eyes, one eye at a time.  · Fluorescein dye is then injected into your arm. The lights in the room are turned off. You may have mild nausea. You may have a warm feeling in your arm or upper body. Tell your provider if your skin feels itchy or if you are having trouble breathing. If so, you could be having an allergic reaction to the  dye.  · More pictures of your eyes are taken over 15 to 30 minutes. The camera shines a bright light into your eyes. Try to keep your head still and your eyes open.  · When enough images have been taken, the test is over.  After your test  Your vision will be blurry for up to 4 to 12 hours. This is because of your dilated pupils. Your eye will be more sensitive to light for up to 12 hours. You may want to wear sunglasses during this time. Do not drive if your vision is very blurry. You may also find it uncomfortable to read. Your skin may look yellow for a few hours. This is from the dye. Your urine will be bright yellow or orange for 24 to 48 hours after the test.     Risks and possible complications  All procedures have some risks. Possible risks of fluorescein angiography include:  · Upset stomach (nausea) and vomiting  · Leaking dye around the injection site that causes pain and swelling  · Metallic taste in your mouth  · Infection at injection site  · Allergic reaction to the dye  · Dry mouth or too much saliva  · Faster heart rate  · Sweating  · Lower back pain   Date Last Reviewed: 5/30/2015  © 6653-1410 [employer]. [address] [place] [address]. All rights reserved. This information is not intended as a substitute for professional medical care. Always follow your healthcare professional's instructions.      .POST INTRAVITREAL INJECTION PATIENT INSTRUCTIONS   Below are some guidelines for what to expect after your treatment and additional care instructions.   * you may experience mild discomfort in your eye after the treatment. Your eye usually feels better within 24 to 48 hours after the procedure.   * you have been given drops that numb the surface of your eye.   DO NOT rub or touch your eye, DO NOT wear contacts until numbness goes away.   * you may experience small spots that appear in your field of vision, these are usually caused by an air bubble or from the medication. It  taked a few hours or days for these to go away.   * use of sunglasses will help reduce light sensitivity and glare.   * DO NOT swim or put sink water ( tap water ) or swin for at least 24 hours after the injection   * If you have a gritty, burning, irritating or stinging feeling in the injected eye. This may be a result of the antiseptic used. Use artifical tears or eye lubricant ( from over- the- counter from your local pharmacy ). If you have some at home already please check the expiration date, so not to use anything contaminated in your eye. A cool pack over your eye brow above the injected eye may also relieve discomfort.   Call us right away or go to the Emergency Department if you have a dramatic decrease in vision or extreme pain in the eye.   [facility] [phone]

## 2018-11-08 ENCOUNTER — OFFICE VISIT (OUTPATIENT)
Dept: OPHTHALMOLOGY | Facility: CLINIC | Age: 83
End: 2018-11-08
Payer: MEDICARE

## 2018-11-08 VITALS — DIASTOLIC BLOOD PRESSURE: 77 MMHG | HEART RATE: 62 BPM | SYSTOLIC BLOOD PRESSURE: 155 MMHG

## 2018-11-08 DIAGNOSIS — H34.8110 CENTRAL RETINAL VEIN OCCLUSION WITH MACULAR EDEMA OF RIGHT EYE: Primary | ICD-10-CM

## 2018-11-08 PROCEDURE — 67210 TREATMENT OF RETINAL LESION: CPT | Mod: PBBFAC,PO,RT | Performed by: OPHTHALMOLOGY

## 2018-11-08 PROCEDURE — 99999 PR PBB SHADOW E&M-EST. PATIENT-LVL III: CPT | Mod: PBBFAC,,, | Performed by: OPHTHALMOLOGY

## 2018-11-08 PROCEDURE — 99213 OFFICE O/P EST LOW 20 MIN: CPT | Mod: PBBFAC,PO | Performed by: OPHTHALMOLOGY

## 2018-11-08 PROCEDURE — 92014 COMPRE OPH EXAM EST PT 1/>: CPT | Mod: 57,S$PBB,, | Performed by: OPHTHALMOLOGY

## 2018-11-08 PROCEDURE — 67210 TREATMENT OF RETINAL LESION: CPT | Mod: S$PBB,RT,, | Performed by: OPHTHALMOLOGY

## 2018-11-08 NOTE — PROGRESS NOTES
HPI     Eye Problem      Additional comments: laser              Comments     DLS 10/11/18- Patient here for Focal laser OD    ATs prn OU          ATs prn OU        Prior FP - OD - swollen ONH with heme  OS - ONH normal - with small cup    OCT - CME OD increased  OS No ME    FA - central MA's OD with leakage  Non leaking MA's OS      A/P    1. CRVO OD with CME  VMT component  S/p Avastin OD x 15    12/15 - increase fluid at 8 weeks - resumed 5-6 week tx  8/16 - no CME, try observation  11/16 only small CME - continue to watch  5/18 - increased CME  6/18 - CME improving  9/18 - slight increase at 7-8 weeks    Get approved for ozurdex    Plan Focal OD Today    2.. AION OD  HTN with disc at risk  May get some vision improvement as disc swelling subsides, but pt aware of poor prognosis      3. HTN Ret OU  BP control    4. PCIOL OU  Great  result      3 months OCT    Risks, benefits, and alternatives to treatment discussed in detail with the patient.  The patient voiced understanding and wished to proceed with the procedure    Laser Procedure Note  Dx: RVO with DME OD  Laser: Focal OD  Argon  Spot: 100  Power: 70  Dur: 0.1  #:   18  Complications: None  F/U as above

## 2018-11-09 NOTE — TELEPHONE ENCOUNTER
Andreia,  request for RF, but I have not seen him in 3 years.  Please set up and annual exam, and if he needs this I can RF it after the appt is made  sharri   See nursing notes.  MAZIN Sun

## 2019-01-18 ENCOUNTER — HOSPITAL ENCOUNTER (EMERGENCY)
Facility: HOSPITAL | Age: 84
Discharge: HOME | End: 2019-01-18
Attending: EMERGENCY MEDICINE
Payer: MEDICARE

## 2019-01-18 VITALS
HEIGHT: 72 IN | OXYGEN SATURATION: 95 % | TEMPERATURE: 98 F | RESPIRATION RATE: 16 BRPM | DIASTOLIC BLOOD PRESSURE: 87 MMHG | BODY MASS INDEX: 27.77 KG/M2 | WEIGHT: 205 LBS | SYSTOLIC BLOOD PRESSURE: 183 MMHG | HEART RATE: 70 BPM

## 2019-01-18 DIAGNOSIS — R42 DIZZINESS: ICD-10-CM

## 2019-01-18 LAB
ALBUMIN SERPL BCP-MCNC: 4.2 G/DL
ALP SERPL-CCNC: 70 U/L
ALT SERPL W/O P-5'-P-CCNC: 15 U/L
ANION GAP SERPL CALC-SCNC: 8 MMOL/L
AST SERPL-CCNC: 16 U/L
BASOPHILS # BLD AUTO: 0.04 K/UL
BASOPHILS NFR BLD: 0.5 %
BILIRUB SERPL-MCNC: 1.2 MG/DL
BILIRUB UR QL STRIP: NEGATIVE
BUN SERPL-MCNC: 12 MG/DL
CALCIUM SERPL-MCNC: 10.1 MG/DL
CHLORIDE SERPL-SCNC: 102 MMOL/L
CLARITY UR REFRACT.AUTO: CLEAR
CO2 SERPL-SCNC: 26 MMOL/L
COLOR UR AUTO: YELLOW
CREAT SERPL-MCNC: 1.1 MG/DL
DIFFERENTIAL METHOD: ABNORMAL
EOSINOPHIL # BLD AUTO: 0.3 K/UL
EOSINOPHIL NFR BLD: 3.8 %
ERYTHROCYTE [DISTWIDTH] IN BLOOD BY AUTOMATED COUNT: 12.5 %
EST. GFR  (AFRICAN AMERICAN): >60 ML/MIN/1.73 M^2
EST. GFR  (NON AFRICAN AMERICAN): >60 ML/MIN/1.73 M^2
GLUCOSE SERPL-MCNC: 118 MG/DL
GLUCOSE UR QL STRIP: NEGATIVE
HCT VFR BLD AUTO: 42.8 %
HGB BLD-MCNC: 14.7 G/DL
HGB UR QL STRIP: NEGATIVE
IMM GRANULOCYTES # BLD AUTO: 0.07 K/UL
IMM GRANULOCYTES NFR BLD AUTO: 0.8 %
KETONES UR QL STRIP: NEGATIVE
LEUKOCYTE ESTERASE UR QL STRIP: NEGATIVE
LYMPHOCYTES # BLD AUTO: 1.8 K/UL
LYMPHOCYTES NFR BLD: 21.4 %
MCH RBC QN AUTO: 32.6 PG
MCHC RBC AUTO-ENTMCNC: 34.3 G/DL
MCV RBC AUTO: 95 FL
MONOCYTES # BLD AUTO: 0.4 K/UL
MONOCYTES NFR BLD: 5.1 %
NEUTROPHILS # BLD AUTO: 5.9 K/UL
NEUTROPHILS NFR BLD: 68.4 %
NITRITE UR QL STRIP: NEGATIVE
NRBC BLD-RTO: 0 /100 WBC
PH UR STRIP: 8 [PH] (ref 5–8)
PLATELET # BLD AUTO: 172 K/UL
PMV BLD AUTO: 10.3 FL
POTASSIUM SERPL-SCNC: 4.2 MMOL/L
PROT SERPL-MCNC: 7.8 G/DL
PROT UR QL STRIP: NEGATIVE
RBC # BLD AUTO: 4.51 M/UL
SODIUM SERPL-SCNC: 136 MMOL/L
SP GR UR STRIP: 1.01 (ref 1–1.03)
TROPONIN I SERPL DL<=0.01 NG/ML-MCNC: <0.006 NG/ML
URN SPEC COLLECT METH UR: NORMAL
WBC # BLD AUTO: 8.58 K/UL

## 2019-01-18 PROCEDURE — 81003 URINALYSIS AUTO W/O SCOPE: CPT

## 2019-01-18 PROCEDURE — 93010 ELECTROCARDIOGRAM REPORT: CPT | Mod: ,,, | Performed by: INTERNAL MEDICINE

## 2019-01-18 PROCEDURE — 99284 PR EMERGENCY DEPT VISIT,LEVEL IV: ICD-10-PCS | Mod: ,,, | Performed by: EMERGENCY MEDICINE

## 2019-01-18 PROCEDURE — 85025 COMPLETE CBC W/AUTO DIFF WBC: CPT

## 2019-01-18 PROCEDURE — 99284 EMERGENCY DEPT VISIT MOD MDM: CPT | Mod: ,,, | Performed by: EMERGENCY MEDICINE

## 2019-01-18 PROCEDURE — 25000003 PHARM REV CODE 250: Performed by: EMERGENCY MEDICINE

## 2019-01-18 PROCEDURE — 93010 EKG 12-LEAD: ICD-10-PCS | Mod: ,,, | Performed by: INTERNAL MEDICINE

## 2019-01-18 PROCEDURE — 80053 COMPREHEN METABOLIC PANEL: CPT

## 2019-01-18 PROCEDURE — 93005 ELECTROCARDIOGRAM TRACING: CPT

## 2019-01-18 PROCEDURE — 99285 EMERGENCY DEPT VISIT HI MDM: CPT

## 2019-01-18 PROCEDURE — 84484 ASSAY OF TROPONIN QUANT: CPT

## 2019-01-18 RX ORDER — DUTASTERIDE 0.5 MG/1
0.5 CAPSULE, LIQUID FILLED ORAL
Status: COMPLETED | OUTPATIENT
Start: 2019-01-18 | End: 2019-01-18

## 2019-01-18 RX ORDER — LOSARTAN POTASSIUM AND HYDROCHLOROTHIAZIDE 12.5; 5 MG/1; MG/1
1 TABLET ORAL
Status: COMPLETED | OUTPATIENT
Start: 2019-01-18 | End: 2019-01-18

## 2019-01-18 RX ORDER — LORAZEPAM 1 MG/1
1 TABLET ORAL
Status: COMPLETED | OUTPATIENT
Start: 2019-01-18 | End: 2019-01-18

## 2019-01-18 RX ADMIN — LOSARTAN POTASSIUM AND HYDROCHLOROTHIAZIDE 1 TABLET: 12.5; 5 TABLET ORAL at 07:01

## 2019-01-18 RX ADMIN — LORAZEPAM 1 MG: 1 TABLET ORAL at 08:01

## 2019-01-18 RX ADMIN — DUTASTERIDE 0.5 MG: 0.5 CAPSULE, LIQUID FILLED ORAL at 07:01

## 2019-01-18 NOTE — ED PROVIDER NOTES
Encounter Date: 1/18/2019    SCRIBE #1 NOTE: I, Eliza Marquez, am scribing for, and in the presence of,  Jak Moss MD. I have scribed the following portions of the note - Other sections scribed: HPI ROS PE.       History     Chief Complaint   Patient presents with    Dizziness     hx parkinsonism, today at 2pm was at [facility] and got dizzy, feeling normal now     Time patient was seen by the provider: 5:43 PM      The patient is a 84 y.o. male with co-morbidities including: Parkinson's, CAD, HTN, chronic systolic CHF who presents to the ED with a complaint of dizziness. Per patient's spouse, they were at [facility] when patient complained of dizziness. Unknown duration of dizziness. Patient has had past episodes of intermittent dizziness. Today, patient's spouse was away from their table for a few minutes when she noticed he spilled his coffee and looked pale. Denies fall. At baseline, patient is ambulatory but slow. Upon arrival to ED, patient is asymptomatic. Denies abdominal pain or any other associated symptoms. Use of baby aspirin.       The history is provided by the patient and the spouse.     Review of patient's allergies indicates:   Allergen Reactions    Pcn [penicillins] Shortness Of Breath     Difficult breathing, Happened years ago    Sulfa (sulfonamide antibiotics) Rash     Past Medical History:   Diagnosis Date    ALLERGIC RHINITIS     Asthma in adult     BPH (benign prostatic hypertrophy)     Cataract     Chronic systolic CHF (congestive heart failure) 10/11    resolved    Clostridium difficile colitis     Coronary artery disease 12/1/11    non obstructive    GERD (gastroesophageal reflux disease)     Heart attack     HTN (hypertension)     Hyperlipidemia     Hypertensive retinopathy of both eyes 3/5/2015    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 nose and back (NMSC), excised by     Squamous cell carcinoma 11/2014    right mid cheek, MOHS    Squamous cell carcinoma 11/2014    left mid cheek, MOHS     Past Surgical History:   Procedure Laterality Date    CARDIAC CATHETERIZATION  12/1/11    non obstructive    CATARACT EXTRACTION W/  INTRAOCULAR LENS IMPLANT Left 06/12/2018    Dr. Richmond    CIRCUMCISION, PRIMARY      COLONOSCOPY N/A 3/21/2014    Performed by Johnnie Madison MD at [facility] ENDO (4TH FLR)    HERNIA REPAIR      INSERTION, INTRAOCULAR LENS Left 6/12/2018    Performed by Jose Richmond MD at [facility] OR 1ST FLR    INSERTION, INTRAOCULAR LENS PROSTHESIS Right 6/26/2018    Performed by Jose Richmond MD at [facility] OR 1ST FLR    PHACOEMULSIFICATION, CATARACT Right 6/26/2018    Performed by Jose Richmond MD at [facility] OR 1ST FLR    PHACOEMULSIFICATION, CATARACT Left 6/12/2018    Performed by Jose [facility] at [facility] OR 1ST FLR    TONSILLECTOMY, ADENOIDECTOMY      TOTAL KNEE ARTHROPLASTY       Family History   Problem Relation Age of Onset    Stroke Mother     Diabetes Maternal Aunt     No Known Problems Father     No Known Problems Sister     No Known Problems Brother     No Known Problems Maternal Uncle     No Known Problems Paternal Aunt     No Known Problems Paternal Uncle     No Known Problems Maternal Grandmother     No Known Problems Maternal Grandfather     No Known Problems Paternal Grandmother     No Known Problems Paternal Grandfather     No Known Problems Daughter     No Known Problems Son     Melanoma Neg Hx     Amblyopia Neg Hx     Blindness Neg Hx     Cancer Neg Hx     Cataracts Neg Hx     Glaucoma Neg Hx     Hypertension Neg Hx     Macular degeneration Neg Hx     Retinal detachment Neg Hx     Strabismus Neg Hx      Thyroid disease Neg Hx     Tremor Neg Hx     Parkinsonism Neg Hx      Social History     Tobacco Use    Smoking status: Never Smoker    Smokeless tobacco: Never Used   Substance Use Topics    Alcohol use: No    Drug use: No     Review of Systems   Constitutional: Negative for fever.   HENT: Negative for sore throat.    Respiratory: Negative for shortness of breath.    Cardiovascular: Negative for chest pain.   Gastrointestinal: Negative for nausea.   Genitourinary: Negative for dysuria.   Musculoskeletal: Negative for back pain.   Skin: Negative for rash.   Neurological: Positive for dizziness. Negative for weakness.   Hematological: Does not bruise/bleed easily.       Physical Exam     Initial Vitals [01/18/19 1630]   BP Pulse Resp Temp SpO2   (!) 195/90 71 18 97.6 °F (36.4 °C) 96 %      MAP       --         Physical Exam    Constitutional: He appears well-developed and well-nourished. No distress.   HENT:   Head: Normocephalic and atraumatic.   Eyes: EOM are normal. Pupils are equal, round, and reactive to light.   Neck: Normal range of motion. Neck supple.   Cardiovascular: Normal rate, regular rhythm and normal heart sounds.   Pulmonary/Chest: Breath sounds normal. No respiratory distress. He has no wheezes. He has no rhonchi. He has no rales.   Abdominal: Soft. He exhibits no distension.   Reducible ventral hernia   Musculoskeletal: Normal range of motion. He exhibits no edema or tenderness.   Neurological: He is alert and oriented to person, place, and time.   Parkinson's tremors but no focal neuro deficit   Skin: Skin is warm.         ED Course   Procedures  Labs Reviewed   CBC W/ AUTO DIFFERENTIAL - Abnormal; Notable for the following components:       Result Value    RBC 4.51 (*)     MCH 32.6 (*)     Immature Granulocytes 0.8 (*)     Immature Grans (Abs) 0.07 (*)     All other components within normal limits   COMPREHENSIVE METABOLIC PANEL - Abnormal; Notable for the following components:    Glucose  118 (*)     Total Bilirubin 1.2 (*)     All other components within normal limits   TROPONIN I   URINALYSIS, REFLEX TO URINE CULTURE    Narrative:     Preferred Collection Type->Urine, Clean Catch     EKG Readings: (Independently Interpreted)   Initial Reading: No STEMI. Rhythm: Normal Sinus Rhythm. Ectopy: PVCs. ST Segments: Non-Specific ST Segment Depression.   Left bundle branch block with an occasional premature ventricular complex no changes from previous EKGs       Imaging Results          CT Head Without Contrast (Final result)  Result time 01/18/19 21:34:55    Final result by Davey Rowe MD (01/18/19 21:34:55)                 Impression:      No acute large vascular territory infarct or intracranial hemorrhage identified.    Age-related senescent changes as above.      Electronically signed by: Davey Rowe MD  Date:    01/18/2019  Time:    21:34             Narrative:    EXAMINATION:  CT HEAD WITHOUT CONTRAST    CLINICAL HISTORY:  Dizziness;    TECHNIQUE:  Low dose axial CT images obtained throughout the head without intravenous contrast. Sagittal and coronal reconstructions were performed.    COMPARISON:  None.    FINDINGS:  Intracranial compartment:    Age-appropriate generalized cerebral volume loss.  The ventricles are midline without distortion by mass effect or evidence of acute hydrocephalus.  No extra-axial blood or fluid collections.    Brain appears normally formed.  Mild degree supratentorial white matter chronic small vessel ischemic change.  No parenchymal mass, hemorrhage, edema or major vascular distribution infarct.    Skull/extracranial contents (limited evaluation): No fracture. Partially imaged retention cyst versus polyp within the anterior right maxillary sinus.  Mastoid air cells are clear.  Probable prior surgery to the bilateral ocular lenses.                               X-Ray Chest AP Portable (Final result)  Result time 01/18/19 18:22:13    Final result by Joanne Norwood,  MD (01/18/19 18:22:13)                 Impression:      No radiographic evidence for acute cardiopulmonary disease.      Electronically signed by: Joanne Norwood MD  Date:    01/18/2019  Time:    18:22             Narrative:    EXAMINATION:  XR CHEST AP PORTABLE    CLINICAL HISTORY:  dizziness;.    TECHNIQUE:  Single frontal portable view of the chest was performed.    COMPARISON:  10/12/2016    FINDINGS:  Support devices: None    The lungs are clear, with normal appearance of pulmonary vasculature and no pleural effusion or pneumothorax.    The cardiac silhouette is normal in size. The hilar and mediastinal contours are unremarkable.    The osseous and soft tissue structures are within normal limits.                                 Medical Decision Making:   History:   Old Medical Records: I decided to obtain old medical records.  Independently Interpreted Test(s):   I have ordered and independently interpreted EKG Reading(s) - see prior notes  Clinical Tests:   Medical Tests: Ordered and Reviewed            Scribe Attestation:   Scribe #1: I performed the above scribed service and the documentation accurately describes the services I performed. I attest to the accuracy of the note.    Attending Attestation:             Attending ED Notes:   I, Dr. Jak Pelayo, personally performed the services described in this documentation. All medical record entries made by the scribe were at my direction and in my presence.  I have reviewed the chart and agree that the record reflects my personal performance and is accurate and complete. Jak Pelayo MD.  5:58 PM 01/18/2019      No evidence of acute neurological deficits patient does have Parkinson's on laboratory work was unremarkable no evidence of infection initial be discharged home follow up with primary care physician continue home medications             Clinical Impression:   The encounter diagnosis was Dizziness.      Disposition:   Disposition:  Discharged  Condition: Stable                        Jak Moss MD  01/18/19 5007

## 2019-01-19 NOTE — ED NOTES
LOC: The patient is awake, alert and aware of environment with an appropriate affect, the patient is speaking appropriately. Suffers from dementia  APPEARANCE: Patient resting comfortably and in no acute distress, patient is clean and well groomed.  SKIN: The skin is warm and dry, patient has normal skin turgor and moist mucus membranes, skin intact, no breakdown or brusing noted.  MUSKULOSKELETAL: Patient moving all extremities well, no obvious swelling or deformities noted.  RESPIRATORY: Airway is open and patent, respirations are spontaneous, patient has a normal effort and rate. Breath sounds with expiratory wheezes  CARDIAC: Normal heart sounds. No peripheral edema.  ABDOMEN: Soft and non tender to palpation, no distention noted. Bowel sounds present.

## 2019-01-19 NOTE — ED NOTES
Report received from Hortensia JENKINS RN. Care assumed. Pt resting comfortably and independently repositioned in stretcher with bed locked in lowest position for safety. NAD and patient states he feels a little better. Respirations even and unlabored and visible chest rise noted. Patient offered bathroom assistance and denies need at this time. Pt instructed to call if assistance is needed.. No needs at this time. Will continue to monitor. Call light within reach. Family at bedside.

## 2019-01-19 NOTE — ED NOTES
Pt is becoming aggitated and wants to go home. Wife asking for something to calm pt due to pt's dementia. Dr. Moss notified and aware

## 2019-01-21 ENCOUNTER — TELEPHONE (OUTPATIENT)
Dept: INTERNAL MEDICINE | Facility: CLINIC | Age: 84
End: 2019-01-21

## 2019-01-21 NOTE — TELEPHONE ENCOUNTER
Patient has an appointment tomorrow 1/21 to follow up from ED. Blood pressure will be checked then.

## 2019-01-22 ENCOUNTER — OFFICE VISIT (OUTPATIENT)
Dept: INTERNAL MEDICINE | Facility: CLINIC | Age: 84
End: 2019-01-22
Payer: MEDICARE

## 2019-01-22 VITALS
DIASTOLIC BLOOD PRESSURE: 82 MMHG | WEIGHT: 213.38 LBS | HEIGHT: 71 IN | OXYGEN SATURATION: 95 % | BODY MASS INDEX: 29.87 KG/M2 | HEART RATE: 68 BPM | TEMPERATURE: 98 F | SYSTOLIC BLOOD PRESSURE: 140 MMHG

## 2019-01-22 DIAGNOSIS — R06.2 WHEEZING: ICD-10-CM

## 2019-01-22 DIAGNOSIS — I10 HYPERTENSION, UNSPECIFIED TYPE: Primary | ICD-10-CM

## 2019-01-22 PROCEDURE — 99214 PR OFFICE/OUTPT VISIT, EST, LEVL IV, 30-39 MIN: ICD-10-PCS | Mod: S$PBB,,, | Performed by: NURSE PRACTITIONER

## 2019-01-22 PROCEDURE — 99999 PR PBB SHADOW E&M-EST. PATIENT-LVL V: CPT | Mod: PBBFAC,,, | Performed by: NURSE PRACTITIONER

## 2019-01-22 PROCEDURE — 99215 OFFICE O/P EST HI 40 MIN: CPT | Mod: PBBFAC | Performed by: NURSE PRACTITIONER

## 2019-01-22 PROCEDURE — 99999 PR PBB SHADOW E&M-EST. PATIENT-LVL V: ICD-10-PCS | Mod: PBBFAC,,, | Performed by: NURSE PRACTITIONER

## 2019-01-22 PROCEDURE — 99214 OFFICE O/P EST MOD 30 MIN: CPT | Mod: S$PBB,,, | Performed by: NURSE PRACTITIONER

## 2019-01-22 RX ORDER — ALBUTEROL SULFATE 0.63 MG/3ML
0.63 SOLUTION RESPIRATORY (INHALATION) EVERY 6 HOURS PRN
Qty: 1 BOX | Refills: 0 | Status: SHIPPED | OUTPATIENT
Start: 2019-01-22 | End: 2019-04-22 | Stop reason: SDUPTHER

## 2019-01-22 RX ORDER — LOSARTAN POTASSIUM 25 MG/1
25 TABLET ORAL DAILY
Qty: 90 TABLET | Refills: 3 | Status: SHIPPED | OUTPATIENT
Start: 2019-01-22 | End: 2019-07-18 | Stop reason: RX

## 2019-01-22 NOTE — PROGRESS NOTES
Subjective:       Patient ID: Dennis Rees is a 84 y.o. male.    Chief Complaint: Follow-up (ED visit 1/18/19)    Disclaimer: This note has been generated using voice-recognition software. There may be typographical errors that have been missed during proof-reading  Pt of Dr Ramírez here with his wife Zaida for ER follow-up.  Patient went to the emergency with dizziness on 01/18/2019 with complaint of dizziness.  Labs and CT were normal.  Wife states that the dizziness comes and goes episodes are brief.  Episodes do not seem to occur with movement.  Wife admits that patient does not drink much water.  Wife states that she has been given the blood pressure medications at night.  Wife states that patient seems to get more anxious lately.      Review of Systems   Constitutional: Negative for activity change and appetite change.   Respiratory: Negative for cough and shortness of breath.    Cardiovascular: Negative for chest pain, palpitations and leg swelling.         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 nose and back (NMSC), excised by     Squamous cell carcinoma 11/2014    right mid cheek, MOHS    Squamous cell carcinoma 11/2014    left mid cheek, MOHS     Past Surgical History:   Procedure  Laterality Date    CARDIAC CATHETERIZATION  12/1/11    non obstructive    CATARACT EXTRACTION W/  INTRAOCULAR LENS IMPLANT Left 06/12/2018    Dr. Richmond    CIRCUMCISION, PRIMARY      COLONOSCOPY N/A 3/21/2014    Performed by [facility] at [facility] ENDO (4TH FLR)    HERNIA REPAIR      INSERTION, INTRAOCULAR LENS Left 6/12/2018    Performed by Jose Richmond MD at [facility] OR 1ST FLR    INSERTION, INTRAOCULAR LENS PROSTHESIS Right 6/26/2018    Performed by [facility] at [facility] OR 1ST FLR    PHACOEMULSIFICATION, CATARACT Right 6/26/2018    Performed by Jose Richmond MD at [facility] OR 1ST FLR    PHACOEMULSIFICATION, CATARACT Left 6/12/2018    Performed by Jose [facility] at [facility] OR 1ST FLR    TONSILLECTOMY, ADENOIDECTOMY      TOTAL KNEE ARTHROPLASTY       Social History     Social History Narrative    Not on file     Family History   Problem Relation Age of Onset    Stroke Mother     Diabetes Maternal Aunt     No Known Problems Father     No Known Problems Sister     No Known Problems Brother     No Known Problems Maternal Uncle     No Known Problems Paternal Aunt     No Known Problems Paternal Uncle     No Known Problems Maternal Grandmother     No Known Problems Maternal Grandfather     No Known Problems Paternal Grandmother     No Known Problems Paternal Grandfather     No Known Problems Daughter     No Known Problems Son     Melanoma Neg Hx     Amblyopia Neg Hx     Blindness Neg Hx     Cancer Neg Hx     Cataracts Neg Hx     Glaucoma Neg Hx     Hypertension Neg Hx     Macular degeneration Neg Hx     Retinal detachment Neg Hx     Strabismus Neg Hx     Thyroid disease Neg Hx     Tremor Neg Hx     Parkinsonism Neg Hx      Outpatient Encounter Medications as of 1/22/2019   Medication Sig Dispense Refill    alclomethasone (ACLOVATE) 0.05 % cream AAA on penis BID as needed for redness. May use up to a week straight at a time. 45 g 1     aspirin (ECOTRIN) 81 MG EC tablet Take 81 mg by mouth nightly.       atorvastatin (LIPITOR) 40 MG tablet Take 1 tablet (40 mg total) by mouth nightly. 90 tablet 3    carvedilol (COREG) 3.125 MG tablet Take 1 tablet (3.125 mg total) by mouth 2 (two) times daily with meals. 180 tablet 3    cetirizine (ZYRTEC) 10 MG tablet Take 10 mg by mouth every morning.       ciclopirox (PENLAC) 8 % Soln Apply daily to affected nail. Must remove with rubbing alcohol once weekly and restart 1 Bottle 5    dutasteride (AVODART) 0.5 mg capsule Take 1 capsule (0.5 mg total) by mouth once daily. (Patient taking differently: Take 0.5 mg by mouth every morning. Take 1 capsule (0.5 mg total) by mouth once daily.) 30 capsule 11    fluticasone (FLONASE) 50 mcg/actuation nasal spray INHALE ONE PUFF IN EACH NOSTRIL EVERY DAY 16 g 3    FLUZONE QUAD [id number] 60 mcg (15 mcg x 4)/0.5 mL Susp INJECT AS DIRECTED  0    losartan (COZAAR) 50 MG tablet TAKE ONE TABLET BY MOUTH EVERY DAY 90 tablet 3    losartan (COZAAR) 50 MG tablet Take 1 tablet (50 mg total) by mouth once daily. 90 tablet 3    omeprazole (PRILOSEC) 40 MG capsule Take 1 capsule (40 mg total) by mouth 2 (two) times daily before meals. 180 capsule 3    PROAIR HFA 90 mcg/actuation inhaler INHALE TWO PUFFS EVERY 6 HOURS AS NEEDED FOR WHEEZING 8.5 g 6    QVAR 40 mcg/actuation Aero INHALE TWO PUFFS TWICE DAILY 8.7 g 3    QVAR REDIHALER 40 mcg/actuation HFAB INHALE TWO puffs BY MOUTH TWICE DAILY 10.6 g 3    albuterol (ACCUNEB) 0.63 mg/3 mL Nebu Take 3 mLs (0.63 mg total) by nebulization every 6 (six) hours as needed. Rescue 1 Box 0    clobetasol (TEMOVATE) 0.05 % cream Apply topically once. Apply once a day. (Patient taking differently: Apply topically twice a week. Apply to tip of Urethra 1-2 times per week) 30 g 5    losartan (COZAAR) 25 MG tablet Take 1 tablet (25 mg total) by mouth once daily. 90 tablet 3     No facility-administered encounter medications on file as of  "1/22/2019.      Last 3 sets of Vitals  Vitals - 1 value per visit 11/8/2018 1/18/2019 1/22/2019   SYSTOLIC 155 183 140   DIASTOLIC 77 87 82   PULSE 62 70 68   TEMPERATURE - 98.2 98.3   RESPIRATIONS - 16 -   SPO2 - 95 95   Weight (lb) - 205 213.41   Weight (kg) - 92.987 96.8   HEIGHT - 6' 0" 5' 11"   BODY MASS INDEX - 27.8 29.76   VISIT REPORT - - -   Pain Score  0 - -   Some recent data might be hidden         Objective:      Physical Exam   Constitutional: He appears well-developed and well-nourished. No distress.   HENT:   Head: Normocephalic and atraumatic.   Right Ear: External ear normal.   Left Ear: External ear normal.   Nose: Nose normal.   Mouth/Throat: Oropharynx is clear and moist. No oropharyngeal exudate.   Eyes: EOM are normal. Pupils are equal, round, and reactive to light.   Neck: Normal range of motion. Neck supple.   Cardiovascular: Normal rate and regular rhythm.   Pulmonary/Chest: Effort normal. He has wheezes.   Musculoskeletal: He exhibits no edema.   Neurological: He is alert.   Walks with slow short gait  Unable to reproduce dizziness     Skin: Skin is warm and dry. Capillary refill takes less than 2 seconds. No rash noted. He is not diaphoretic. No erythema. No pallor.   Psychiatric: He has a normal mood and affect. His behavior is normal.   Nursing note and vitals reviewed.          Lab Results   Component Value Date    WBC 8.58 01/18/2019    RBC 4.51 (L) 01/18/2019    HGB 14.7 01/18/2019    HCT 42.8 01/18/2019    MCV 95 01/18/2019    MCH 32.6 (H) 01/18/2019    MCHC 34.3 01/18/2019    RDW 12.5 01/18/2019     01/18/2019    MPV 10.3 01/18/2019    GRAN 5.9 01/18/2019    GRAN 68.4 01/18/2019    LYMPH 1.8 01/18/2019    LYMPH 21.4 01/18/2019    MONO 0.4 01/18/2019    MONO 5.1 01/18/2019    EOS 0.3 01/18/2019    BASO 0.04 01/18/2019    EOSINOPHIL 3.8 01/18/2019    BASOPHIL 0.5 01/18/2019     Lab Results   Component Value Date    WBC 8.58 01/18/2019    HGB 14.7 01/18/2019    HCT 42.8 " 01/18/2019     01/18/2019    CHOL 121 06/01/2018    TRIG 96 06/01/2018    HDL 40 06/01/2018    ALT 15 01/18/2019    AST 16 01/18/2019     01/18/2019    K 4.2 01/18/2019     01/18/2019    CREATININE 1.1 01/18/2019    BUN 12 01/18/2019    CO2 26 01/18/2019    TSH 2.360 10/17/2016    PSA 1.4 05/20/2015    INR 1.0 11/30/2011       Assessment:       1. Hypertension, unspecified type    2. Wheezing        Plan:       Dizziness could be 2/2 mild dehydration versus HTN.  Will add 25 mg of Cozaar and have wife give meds in am instead of at bedtime  Call with BP readings once a week.  Wait 2 hrs after taking meds to check BP      Dennis was seen today for follow-up.    Diagnoses and all orders for this visit:    Hypertension, unspecified type  -     losartan (COZAAR) 25 MG tablet; Take 1 tablet (25 mg total) by mouth once daily.    Wheezing  -     NEBULIZER FOR HOME USE  -     albuterol (ACCUNEB) 0.63 mg/3 mL Nebu; Take 3 mLs (0.63 mg total) by nebulization every 6 (six) hours as needed. Rescue      Patient Instructions   Take an additional 25 mg of Cozaar with the 50 mg of Cozaar to make a total of 75 mg./  Take in the morning and wait about 2 hours then check blood pressure    Call me with Mr. Rees's blood pressure readings once a week    Increase fluid intake

## 2019-01-22 NOTE — PATIENT INSTRUCTIONS
Take an additional 25 mg of Cozaar with the 50 mg of Cozaar to make a total of 75 mg./  Take in the morning and wait about 2 hours then check blood pressure    Call me with Mr. Rees's blood pressure readings once a week    Increase fluid intake

## 2019-01-25 ENCOUNTER — TELEPHONE (OUTPATIENT)
Dept: INTERNAL MEDICINE | Facility: CLINIC | Age: 84
End: 2019-01-25

## 2019-01-25 NOTE — TELEPHONE ENCOUNTER
----- Message from Leida Mckenna sent at 1/25/2019  2:16 PM CST -----  Contact: Pt's Wife Clarisse Cell# [phone]  Patient's wife is calling in regards to letting you know that reading of her husbands blood pressure. She would like a call back please to discuss this with you.

## 2019-01-28 NOTE — TELEPHONE ENCOUNTER
Spoke with patient wife. She reported BP has improved. Home readings averaging 120s-140s/70s. He is taking increased dose of Cozaar and tolerating well. Wife states he is still a bit anxious, she plans to schedule f/u with Dr. Ramírez in the coming weeks. Advised to continue current meds, BP WNL, and schedule PCP f/u prn.    Breonna Apple PA-C

## 2019-02-08 ENCOUNTER — OFFICE VISIT (OUTPATIENT)
Dept: INTERNAL MEDICINE | Facility: CLINIC | Age: 84
End: 2019-02-08
Payer: MEDICARE

## 2019-02-08 VITALS
BODY MASS INDEX: 29.72 KG/M2 | HEIGHT: 71 IN | HEART RATE: 68 BPM | DIASTOLIC BLOOD PRESSURE: 90 MMHG | SYSTOLIC BLOOD PRESSURE: 158 MMHG | OXYGEN SATURATION: 98 % | WEIGHT: 212.31 LBS

## 2019-02-08 DIAGNOSIS — I50.22 CHRONIC SYSTOLIC CHF (CONGESTIVE HEART FAILURE): ICD-10-CM

## 2019-02-08 DIAGNOSIS — G20.C PARKINSONISM, UNSPECIFIED PARKINSONISM TYPE: ICD-10-CM

## 2019-02-08 DIAGNOSIS — F41.9 ANXIETY: ICD-10-CM

## 2019-02-08 DIAGNOSIS — E78.5 HYPERLIPIDEMIA, UNSPECIFIED HYPERLIPIDEMIA TYPE: ICD-10-CM

## 2019-02-08 DIAGNOSIS — Z00.00 ANNUAL PHYSICAL EXAM: Primary | ICD-10-CM

## 2019-02-08 DIAGNOSIS — I10 BENIGN ESSENTIAL HTN: ICD-10-CM

## 2019-02-08 PROCEDURE — 99214 PR OFFICE/OUTPT VISIT, EST, LEVL IV, 30-39 MIN: ICD-10-PCS | Mod: S$PBB,,, | Performed by: FAMILY MEDICINE

## 2019-02-08 PROCEDURE — 99999 PR PBB SHADOW E&M-EST. PATIENT-LVL V: ICD-10-PCS | Mod: PBBFAC,,, | Performed by: FAMILY MEDICINE

## 2019-02-08 PROCEDURE — 99214 OFFICE O/P EST MOD 30 MIN: CPT | Mod: S$PBB,,, | Performed by: FAMILY MEDICINE

## 2019-02-08 PROCEDURE — 99999 PR PBB SHADOW E&M-EST. PATIENT-LVL V: CPT | Mod: PBBFAC,,, | Performed by: FAMILY MEDICINE

## 2019-02-08 PROCEDURE — 99215 OFFICE O/P EST HI 40 MIN: CPT | Mod: PBBFAC | Performed by: FAMILY MEDICINE

## 2019-02-08 RX ORDER — CITALOPRAM 10 MG/1
10 TABLET ORAL DAILY
Qty: 30 TABLET | Refills: 11 | Status: SHIPPED | OUTPATIENT
Start: 2019-02-08 | End: 2020-01-15 | Stop reason: SDUPTHER

## 2019-02-08 NOTE — PROGRESS NOTES
Subjective:       Patient ID: Dennis Rees is a 84 y.o. male.    Chief Complaint: Follow-up (urgent care f/u) and Anxiety    Patient is here for follow-up of their chronic diseases, annual exam (last seen 4 years ago) and increasing anxiety. He sees lots of specialists and no longer wants to see doctors much. He is repeatedly saying today polly the wants to go home asap to take care of his dog. He has not seen neurology about his parkinson's since 2016, and agrees to set up an appt. He has recent blood work from an ER visit. He had the pneumovax-23 in 2002 (documented in legacy chart) and wife ways he had the prevnar-13 at [facility] and she will try to track down the date on it for our records. He go the flu shot this season    He is increasingly anxious mila about doctor visits. Wife reports that BP sare good at hoem when not anxiouys, higher when anxious. Agrees to try a medication as long as it does not sedate him and it is low dose      Review of Systems   Constitutional: Negative for chills and fatigue.   HENT: Negative for ear pain.    Eyes: Negative for pain.   Respiratory: Negative for chest tightness.    Cardiovascular: Negative for chest pain.   Gastrointestinal: Negative for abdominal pain.   Genitourinary: Negative for flank pain.   Musculoskeletal: Negative for gait problem.   Neurological: Negative for syncope.   Psychiatric/Behavioral: Negative for behavioral problems. The patient is nervous/anxious.         Sheech is halting perhaps c/w Parkinson's       Objective:      Physical Exam   Constitutional: He appears well-developed.   HENT:   Head: Normocephalic and atraumatic.   Eyes: EOM are normal.   Neck: Neck supple.   Cardiovascular: Normal rate.   Pulmonary/Chest: Breath sounds normal. No respiratory distress. He has no wheezes. He has no rales.   Abdominal: Soft.   Musculoskeletal: He exhibits no edema.   Neurological: He is alert.   Skin: Skin is dry.   Psychiatric:   Anxious, halting speech      "  Assessment:       1. Annual physical exam    2. Chronic systolic CHF (congestive heart failure)    3. Hyperlipidemia, unspecified hyperlipidemia type    4. Benign essential HTN    5. Parkinsonism, unspecified Parkinsonism type    6. Anxiety        Plan:       Dennis was seen today for follow-up and anxiety.    Diagnoses and all orders for this visit:    Annual physical exam    Chronic systolic CHF (congestive heart failure)  Hyperlipidemia, unspecified hyperlipidemia type  -followed by cardiology    Benign essential HTN  BP (!) 158/90 (BP Location: Right arm, Patient Position: Sitting, BP Method: Large (Manual))   Pulse 68   Ht 5' 11" (1.803 m)   Wt 96.3 kg (212 lb 4.9 oz)   SpO2 98%   BMI 29.61 kg/m²   BPs at [facility] reviewed, they are meeting goals of <130/80 when not anxious, are higher like today or worse when anxious  Radha carbajal ein BP meds, instead treat anxiety    Parkinsonism, unspecified Parkinsonism type  -     Ambulatory consult to Neurology    Anxiety  -     citalopram (CELEXA) 10 MG tablet; Take 1 tablet (10 mg total) by mouth once daily.  - f/u prn - they would liekt o f/u via telephone or my [facility], rather than office visits          "

## 2019-02-10 ENCOUNTER — HOSPITAL ENCOUNTER (EMERGENCY)
Facility: HOSPITAL | Age: 84
Discharge: HOME | End: 2019-02-10
Attending: EMERGENCY MEDICINE
Payer: MEDICARE

## 2019-02-10 VITALS
TEMPERATURE: 98 F | WEIGHT: 211.63 LBS | OXYGEN SATURATION: 98 % | RESPIRATION RATE: 18 BRPM | HEIGHT: 71 IN | DIASTOLIC BLOOD PRESSURE: 77 MMHG | HEART RATE: 74 BPM | SYSTOLIC BLOOD PRESSURE: 175 MMHG | BODY MASS INDEX: 29.63 KG/M2

## 2019-02-10 DIAGNOSIS — I95.1 ORTHOSTATIC HYPOTENSION: Primary | ICD-10-CM

## 2019-02-10 DIAGNOSIS — R55 SYNCOPE AND COLLAPSE: ICD-10-CM

## 2019-02-10 LAB
ALBUMIN SERPL BCP-MCNC: 4 G/DL
ALP SERPL-CCNC: 69 U/L
ALT SERPL W/O P-5'-P-CCNC: 16 U/L
ANION GAP SERPL CALC-SCNC: 7 MMOL/L
AST SERPL-CCNC: 18 U/L
BASOPHILS # BLD AUTO: 0.03 K/UL
BASOPHILS NFR BLD: 0.4 %
BILIRUB SERPL-MCNC: 1.1 MG/DL
BNP SERPL-MCNC: 105 PG/ML
BUN SERPL-MCNC: 10 MG/DL
CALCIUM SERPL-MCNC: 9.7 MG/DL
CHLORIDE SERPL-SCNC: 102 MMOL/L
CO2 SERPL-SCNC: 27 MMOL/L
CREAT SERPL-MCNC: 0.9 MG/DL
DIFFERENTIAL METHOD: ABNORMAL
EOSINOPHIL # BLD AUTO: 0.2 K/UL
EOSINOPHIL NFR BLD: 3.3 %
ERYTHROCYTE [DISTWIDTH] IN BLOOD BY AUTOMATED COUNT: 12.5 %
EST. GFR  (AFRICAN AMERICAN): >60 ML/MIN/1.73 M^2
EST. GFR  (NON AFRICAN AMERICAN): >60 ML/MIN/1.73 M^2
GLUCOSE SERPL-MCNC: 103 MG/DL
HCT VFR BLD AUTO: 42.1 %
HGB BLD-MCNC: 14.4 G/DL
IMM GRANULOCYTES # BLD AUTO: 0.03 K/UL
IMM GRANULOCYTES NFR BLD AUTO: 0.4 %
LYMPHOCYTES # BLD AUTO: 2.1 K/UL
LYMPHOCYTES NFR BLD: 28.9 %
MCH RBC QN AUTO: 33 PG
MCHC RBC AUTO-ENTMCNC: 34.2 G/DL
MCV RBC AUTO: 97 FL
MONOCYTES # BLD AUTO: 0.5 K/UL
MONOCYTES NFR BLD: 6.5 %
NEUTROPHILS # BLD AUTO: 4.5 K/UL
NEUTROPHILS NFR BLD: 60.5 %
NRBC BLD-RTO: 0 /100 WBC
PLATELET # BLD AUTO: 164 K/UL
PMV BLD AUTO: 10.4 FL
POTASSIUM SERPL-SCNC: 4.4 MMOL/L
PROT SERPL-MCNC: 7.3 G/DL
RBC # BLD AUTO: 4.36 M/UL
SODIUM SERPL-SCNC: 136 MMOL/L
TROPONIN I SERPL DL<=0.01 NG/ML-MCNC: <0.006 NG/ML
WBC # BLD AUTO: 7.36 K/UL

## 2019-02-10 PROCEDURE — 84484 ASSAY OF TROPONIN QUANT: CPT

## 2019-02-10 PROCEDURE — 99285 PR EMERGENCY DEPT VISIT,LEVEL V: ICD-10-PCS | Mod: ,,, | Performed by: EMERGENCY MEDICINE

## 2019-02-10 PROCEDURE — 93010 EKG 12-LEAD: ICD-10-PCS | Mod: ,,, | Performed by: INTERNAL MEDICINE

## 2019-02-10 PROCEDURE — 83880 ASSAY OF NATRIURETIC PEPTIDE: CPT

## 2019-02-10 PROCEDURE — 93010 ELECTROCARDIOGRAM REPORT: CPT | Mod: ,,, | Performed by: INTERNAL MEDICINE

## 2019-02-10 PROCEDURE — 85025 COMPLETE CBC W/AUTO DIFF WBC: CPT

## 2019-02-10 PROCEDURE — 25000003 PHARM REV CODE 250: Performed by: EMERGENCY MEDICINE

## 2019-02-10 PROCEDURE — 93005 ELECTROCARDIOGRAM TRACING: CPT

## 2019-02-10 PROCEDURE — 99285 EMERGENCY DEPT VISIT HI MDM: CPT | Mod: ,,, | Performed by: EMERGENCY MEDICINE

## 2019-02-10 PROCEDURE — 99284 EMERGENCY DEPT VISIT MOD MDM: CPT

## 2019-02-10 PROCEDURE — 80053 COMPREHEN METABOLIC PANEL: CPT

## 2019-02-10 RX ORDER — ASPIRIN 325 MG
325 TABLET ORAL
Status: COMPLETED | OUTPATIENT
Start: 2019-02-10 | End: 2019-02-10

## 2019-02-10 RX ADMIN — ASPIRIN 325 MG ORAL TABLET 325 MG: 325 PILL ORAL at 01:02

## 2019-02-10 RX ADMIN — SODIUM CHLORIDE 1000 ML: 0.9 INJECTION, SOLUTION INTRAVENOUS at 01:02

## 2019-02-10 NOTE — ED NOTES
Patient identifiers verified and correct for Dennis Rees.   LOC: The patient is awake, alert and aware of environment with an appropriate affect, the patient is oriented x 3 and speaking appropriately.   APPEARANCE: Patient appears comfortable and in no acute distress, patient is clean and well groomed.  SKIN: The skin is warm and dry, color consistent with ethnicity, patient has normal skin turgor and moist mucus membranes, skin intact, no breakdown or bruising noted.   MUSCULOSKELETAL: Patient moving all extremities spontaneously, no swelling noted.  RESPIRATORY: Airway is open and patent, respirations are spontaneous, patient has a normal effort and rate, no accessory muscle use noted, pt placed on continuous pulse ox with O2 sats noted at 98% on room air.  CARDIAC: Pt placed on cardiac monitor. Patient has a normal rate and regular rhythm, no edema noted, capillary refill < 3 seconds.   GASTRO: Soft and non tender to palpation, no distention noted, normoactive bowel sounds present in all four quadrants. Pt states bowel movements have been regular.  : Pt denies any pain or frequency with urination.  NEURO: Pt opens eyes spontaneously, behavior appropriate to situation, follows commands, facial expression symmetrical, bilateral hand grasp equal and even, purposeful motor response noted, normal sensation in all extremities when touched with a finger.

## 2019-02-10 NOTE — ED PROVIDER NOTES
Encounter Date: 2/10/2019    SCRIBE #1 NOTE: I, Sulema Womack, am scribing for, and in the presence of,  Dr. Pavon. I have scribed the entire note.       History     Chief Complaint   Patient presents with    Loss of Consciousness     Patient at home with syncope    Hypotension     En Route to Hospital, Patient received Fluid with increased BP     Time patient was seen by the provider: 1:10 PM      Mr. Rees is a 84 y.o. male with co-morbidities including: CHF, CAD, HLD, HTN, SCC, and Parkinson's disease, who presents to the ED with a complaint of an LOC and hypotension this afternoon. Patient was at [employer] this morning when he experienced a syncopal episode while sitting. Wife notes that he was unresponsive and diaphoretic for a brief period of time prior to the syncopal episode, but he neither closed his eyes nor was he tremulous during the episode. Patient was unresponsive for 10-12 minutes after the episode and Patient had a systolic BP of 60/40 and glucose of 128 upon EMS arrival. He received fluid in route to ED. Patient is unable to recall the syncopal episode at this time. Denies active CP, weakness, nausea, abd pain, fevers or chills. Wife notes that the patient had a dizziness episode 3 weeks ago and was hypertensive while in the ED. PCP later increased his Losartan from 50 mg to 75 mg.       The history is provided by the patient, the spouse and medical records.     Review of patient's allergies indicates:   Allergen Reactions    Pcn [penicillins] Shortness Of Breath     Difficult breathing, Happened years ago    Sulfa (sulfonamide antibiotics) Rash     Past Medical History:   Diagnosis Date    ALLERGIC RHINITIS     Asthma in adult     BPH (benign prostatic hypertrophy)     Cataract     Chronic systolic CHF (congestive heart failure) 10/11    resolved    Clostridium difficile colitis     Coronary artery disease 12/1/11    non obstructive    GERD (gastroesophageal reflux disease)     Heart  attack     HTN (hypertension)     Hyperlipidemia     Hypertensive retinopathy of both eyes 3/5/2015    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 nose and back (NMSC), excised by     Squamous cell carcinoma 11/2014    right mid cheek, MOHS    Squamous cell carcinoma 11/2014    left mid cheek, MOHS     Past Surgical History:   Procedure Laterality Date    CARDIAC CATHETERIZATION  12/1/11    non obstructive    CATARACT EXTRACTION W/  INTRAOCULAR LENS IMPLANT Left 06/12/2018    Dr. Richmond    CIRCUMCISION, PRIMARY      COLONOSCOPY N/A 3/21/2014    Performed by Johnnie Madison MD at [facility] (4TH FLR)    HERNIA REPAIR      INSERTION, INTRAOCULAR LENS Left 6/12/2018    Performed by Jose Richmond MD at [facility] OR 1ST FLR    INSERTION, INTRAOCULAR LENS PROSTHESIS Right 6/26/2018    Performed by Jose Richmond MD at [facility] 1ST FLR    PHACOEMULSIFICATION, CATARACT Right 6/26/2018    Performed by Jose Richmond MD at [facility] 1ST FLR    PHACOEMULSIFICATION, CATARACT Left 6/12/2018    Performed by Jose Richmond MD at [facility] 1ST FLR    TONSILLECTOMY, ADENOIDECTOMY      TOTAL KNEE ARTHROPLASTY       Family History   Problem Relation Age of Onset    Stroke Mother     Diabetes Maternal Aunt     No Known Problems Father     No Known Problems Sister     No Known Problems Brother     No Known Problems Maternal Uncle     No Known Problems Paternal Aunt     No Known Problems Paternal Uncle     No Known Problems Maternal Grandmother     No Known Problems Maternal Grandfather     No Known Problems Paternal Grandmother     No Known Problems Paternal Grandfather     No Known Problems Daughter     No  Known Problems Son     Melanoma Neg Hx     Amblyopia Neg Hx     Blindness Neg Hx     Cancer Neg Hx     Cataracts Neg Hx     Glaucoma Neg Hx     Hypertension Neg Hx     Macular degeneration Neg Hx     Retinal detachment Neg Hx     Strabismus Neg Hx     Thyroid disease Neg Hx     Tremor Neg Hx     Parkinsonism Neg Hx      Social History     Tobacco Use    Smoking status: Never Smoker    Smokeless tobacco: Never Used   Substance Use Topics    Alcohol use: No    Drug use: No     Review of Systems   Constitutional: Negative for chills and fever.   HENT: Negative for nosebleeds.    Eyes: Negative for visual disturbance.   Respiratory: Negative for wheezing.    Cardiovascular: Negative for chest pain.   Gastrointestinal: Negative for abdominal pain and nausea.   Musculoskeletal: Negative for gait problem.   Skin: Negative for wound.   Neurological: Positive for syncope. Negative for weakness.   Psychiatric/Behavioral: Negative for confusion.       Physical Exam     Initial Vitals [02/10/19 1258]   BP Pulse Resp Temp SpO2   (!) 157/76 (!) 57 16 97.8 °F (36.6 °C) 98 %      MAP       --         Physical Exam    Nursing note and vitals reviewed.  Constitutional: He appears well-developed and well-nourished. No distress.   HENT:   Head: Normocephalic and atraumatic.   Mouth/Throat: Mucous membranes are dry.   Neck: Normal range of motion. Neck supple. No JVD present.   Cardiovascular: Normal rate, regular rhythm, normal heart sounds and intact distal pulses.   Pulmonary/Chest: Breath sounds normal. No respiratory distress. He has no wheezes. He has no rhonchi. He has no rales.   Abdominal: Soft. He exhibits no distension. There is no tenderness.   Musculoskeletal: Normal range of motion.   No peripheral edema   Lymphadenopathy:     He has no cervical adenopathy.   Neurological: He is alert and oriented to person, place, and time. He has normal strength. No cranial nerve deficit or sensory deficit.   Skin: Skin  is warm and dry.   Psychiatric:   Flat, blunt affect         ED Course   Procedures  Labs Reviewed   CBC W/ AUTO DIFFERENTIAL - Abnormal; Notable for the following components:       Result Value    RBC 4.36 (*)     MCH 33.0 (*)     All other components within normal limits   COMPREHENSIVE METABOLIC PANEL - Abnormal; Notable for the following components:    Total Bilirubin 1.1 (*)     Anion Gap 7 (*)     All other components within normal limits   B-TYPE NATRIURETIC PEPTIDE - Abnormal; Notable for the following components:     (*)     All other components within normal limits   TROPONIN I     EKG Readings: (Independently Interpreted)   Rhythm: Sinus Bradycardia. Heart Rate: 54. Clinical Impression: with LBBB   No ischemic changes.        Imaging Results          X-Ray Chest AP Portable (Final result)  Result time 02/10/19 13:50:51    Final result by Cordell Mueller MD (02/10/19 13:50:51)                 Impression:      As above.      Electronically signed by: Cordell Mueller MD  Date:    02/10/2019  Time:    13:50             Narrative:    EXAMINATION:  XR CHEST AP PORTABLE    CLINICAL HISTORY:  Chest Pain;    TECHNIQUE:  Single frontal view of the chest was performed.    COMPARISON:  01/18/2019    FINDINGS:  No consolidation, pleural effusion or pneumothorax.  Cardiomediastinal silhouette is unremarkable.                                 Medical Decision Making:   History:   Old Medical Records: I decided to obtain old medical records.  Initial Assessment:   Emergent evaluation of 85 y/o male with a syncopal episode. Found to be hypotensive, 60 systolic in field by EMS.   Differential Diagnosis:   Orthostatic hypotension, cardiac ischemia, cardiac arrhythmia, less likely CVA/TIA, new onset seizure   Independently Interpreted Test(s):   I have ordered and independently interpreted EKG Reading(s) - see prior notes  Clinical Tests:   Lab Tests: Ordered and Reviewed  Radiological Study: Ordered and  Reviewed  Medical Tests: Ordered and Reviewed  ED Management:  -Labs  -EKG  -cardiac monitor   -orthostatics   -IV fluids       2:07 PM  Labs reviewed with no leukocytosis. H&H is baseline. CMP within normal limits. Troponin and BNP negative. Orthostatics postiive. Patient with IV fluids. Will continue to monitor.     2:52 PM  Patient reports significant improvement after IV fluids. Repeat orthostatics negative after IVF. Denies dizziness. I think the patient's symptoms are likely secondary to recent BP medication change and hypovolemia. Patient will be d/c to follow-up with PCP for possible further adjustments.             Scribe Attestation:   Scribe #1: I performed the above scribed service and the documentation accurately describes the services I performed. I attest to the accuracy of the note.    Attending Attestation:           Physician Attestation for Scribe:      Comments: I, Dr. Camila Pavon, personally performed the services described in this documentation. All medical record entries made by the scribe were at my direction and in my presence.  I have reviewed the chart and agree that the record reflects my personal performance and is accurate and complete. Camila Pavon MD.                 Clinical Impression:   The primary encounter diagnosis was Orthostatic hypotension. A diagnosis of Syncope and collapse was also pertinent to this visit.      Disposition:   Disposition: Discharged  Condition: Stable                        Camila Pavon MD  02/10/19 1946

## 2019-02-10 NOTE — ED TRIAGE NOTES
Per family, pt was at [employer] today and became syncopal. Pt denies head trauma/LOC. Pt has history of Parkinson's. Pt denies any complaints at this time.

## 2019-02-10 NOTE — DISCHARGE INSTRUCTIONS
- follow up with PCP for further evaluation  - may need BP meds re-adjusted  - return to ED if symptoms get worse    Our goal in the emergency department is to always give you outstanding care and exceptional service. You may receive a survey by mail or e-mail in the next week regarding your experience in our ED. We would greatly appreciate your completing and returning the survey. Your feedback provides us with a way to recognize our staff who give very good care and it helps us learn how to improve when your experience was below our aspiration of excellence.

## 2019-02-14 ENCOUNTER — TELEPHONE (OUTPATIENT)
Dept: INTERNAL MEDICINE | Facility: CLINIC | Age: 84
End: 2019-02-14

## 2019-02-14 NOTE — TELEPHONE ENCOUNTER
----- Message from Shoshana Stock sent at 2/13/2019 10:09 AM CST -----  Contact: wife/Clarisse/[phone]  Please call wife about some information about her .

## 2019-03-08 ENCOUNTER — TELEPHONE (OUTPATIENT)
Dept: OPTOMETRY | Facility: CLINIC | Age: 84
End: 2019-03-08

## 2019-03-13 DIAGNOSIS — J45.901 ASTHMA EXACERBATION: ICD-10-CM

## 2019-03-13 DIAGNOSIS — J45.40 ASTHMA IN ADULT, MODERATE PERSISTENT, UNCOMPLICATED: ICD-10-CM

## 2019-03-14 DIAGNOSIS — J45.901 ASTHMA EXACERBATION: ICD-10-CM

## 2019-03-14 DIAGNOSIS — J45.40 ASTHMA IN ADULT, MODERATE PERSISTENT, UNCOMPLICATED: ICD-10-CM

## 2019-03-14 RX ORDER — ALBUTEROL SULFATE 90 UG/1
AEROSOL, METERED RESPIRATORY (INHALATION)
Qty: 8.5 G | Refills: 0 | Status: SHIPPED | OUTPATIENT
Start: 2019-03-14 | End: 2020-01-17 | Stop reason: SDUPTHER

## 2019-03-14 RX ORDER — ALBUTEROL SULFATE 90 UG/1
AEROSOL, METERED RESPIRATORY (INHALATION)
Qty: 8.5 G | Refills: 6 | Status: SHIPPED | OUTPATIENT
Start: 2019-03-14 | End: 2019-03-14 | Stop reason: SDUPTHER

## 2019-03-14 NOTE — TELEPHONE ENCOUNTER
"----- Message from Blanka Gonzalez sent at 3/14/2019 10:15 AM CDT -----  Contact: [facility] pharmacy/[phone]  RX request - refill or new RX.  Is this a refill or new RX:  refill  RX name and strength: PROAIR HFA 90 mcg/actuation inhaler  Directions:   Is this a 30 day or 90 day RX:    Local pharmacy or mail order pharmacy:  Local pharmacy  Pharmacy name and phone # (DON'T enter "on file" or "in chart"): [facility] & [place], [place] - [address] [phone] (Phone)  [phone] (Fax)  Comments:          "

## 2019-04-22 DIAGNOSIS — R06.2 WHEEZING: ICD-10-CM

## 2019-04-23 RX ORDER — ALBUTEROL SULFATE 0.63 MG/3ML
SOLUTION RESPIRATORY (INHALATION)
Qty: 90 ML | Refills: 3 | Status: SHIPPED | OUTPATIENT
Start: 2019-04-23 | End: 2019-05-03 | Stop reason: SDUPTHER

## 2019-04-26 ENCOUNTER — TELEPHONE (OUTPATIENT)
Dept: INTERNAL MEDICINE | Facility: CLINIC | Age: 84
End: 2019-04-26

## 2019-04-26 NOTE — TELEPHONE ENCOUNTER
----- Message from Angelica Manuel sent at 4/26/2019 10:45 AM CDT -----  Contact: [employer] [phone]  Prior Authorization Needed    Medication: albuterol (ACCUNEB) 0.63 mg/3 mL [facility]    Pharmacy Info: [facility] [place], [place] - [address] Barrera does not cover this medication. Please call plan at [phone] to initiate prior authorization or call/fax pharmacy to change medication. Patient ID#[id number] Group [id number]    Note chart when prior authorization has been submitted.    Please notify pharmacy when prior authorization has been approved.    Thank You    Please advise

## 2019-04-29 NOTE — TELEPHONE ENCOUNTER
I called the number provided in the message. His insurance med provider is [employer]/ [employer]21 [phone]. Fax form received to be completed and faxed back for a decision to be made about coverage.  Forms place on the physician's desk.

## 2019-04-30 ENCOUNTER — TELEPHONE (OUTPATIENT)
Dept: INTERNAL MEDICINE | Facility: CLINIC | Age: 84
End: 2019-04-30

## 2019-04-30 ENCOUNTER — PES CALL (OUTPATIENT)
Dept: ADMINISTRATIVE | Facility: CLINIC | Age: 84
End: 2019-04-30

## 2019-04-30 NOTE — TELEPHONE ENCOUNTER
[facility] sent a letter denying coverage on Albuterol Sulfate 0.063 mg/3ml for home use. Forms place on the physician's desk.

## 2019-05-03 ENCOUNTER — TELEPHONE (OUTPATIENT)
Dept: INTERNAL MEDICINE | Facility: CLINIC | Age: 84
End: 2019-05-03

## 2019-05-03 DIAGNOSIS — R06.2 WHEEZING: ICD-10-CM

## 2019-05-03 RX ORDER — ALBUTEROL SULFATE 0.63 MG/3ML
0.63 SOLUTION RESPIRATORY (INHALATION) EVERY 6 HOURS PRN
Qty: 90 ML | Refills: 3 | Status: SHIPPED | OUTPATIENT
Start: 2019-05-03 | End: 2019-05-06 | Stop reason: SDUPTHER

## 2019-05-03 NOTE — TELEPHONE ENCOUNTER
Please call pt  The albuterol vials for [email or web address].verneller were denied by insurance because pharmacy filed under part D and they need to file under part B  I will reorder but to our pharmacy in this building to see if they can get it approved

## 2019-05-06 ENCOUNTER — TELEPHONE (OUTPATIENT)
Dept: INTERNAL MEDICINE | Facility: CLINIC | Age: 84
End: 2019-05-06

## 2019-05-06 DIAGNOSIS — R06.2 WHEEZING: ICD-10-CM

## 2019-05-06 RX ORDER — ALBUTEROL SULFATE 0.63 MG/3ML
0.63 SOLUTION RESPIRATORY (INHALATION) EVERY 6 HOURS PRN
Qty: 90 ML | Refills: 3 | Status: SHIPPED | OUTPATIENT
Start: 2019-05-06

## 2019-05-06 NOTE — TELEPHONE ENCOUNTER
----- Message from Jie Carlisle sent at 5/3/2019  2:38 PM CDT -----  A prescription was sent over for albuterol nebulizer solution but it is covered under his Medicare Part B coverage and we are unable to bill Medicare Part B here at [facility]. It would have to be sent to a pharmacy that can bill Part B such as [facility] or [facility].

## 2019-06-05 DIAGNOSIS — N40.0 BENIGN NON-NODULAR PROSTATIC HYPERPLASIA WITHOUT LOWER URINARY TRACT SYMPTOMS: ICD-10-CM

## 2019-06-06 RX ORDER — DUTASTERIDE 0.5 MG/1
CAPSULE, LIQUID FILLED ORAL
Qty: 30 CAPSULE | Refills: 11 | Status: SHIPPED | OUTPATIENT
Start: 2019-06-06 | End: 2020-03-02 | Stop reason: SDUPTHER

## 2019-07-08 DIAGNOSIS — J30.89 ENVIRONMENTAL AND SEASONAL ALLERGIES: ICD-10-CM

## 2019-07-08 DIAGNOSIS — I95.1 DELAYED ORTHOSTATIC HYPOTENSION: ICD-10-CM

## 2019-07-08 RX ORDER — FLUTICASONE PROPIONATE 50 MCG
1 SPRAY, SUSPENSION (ML) NASAL DAILY
Qty: 16 G | Refills: 3 | Status: SHIPPED | OUTPATIENT
Start: 2019-07-08 | End: 2020-03-02 | Stop reason: SDUPTHER

## 2019-07-08 RX ORDER — CARVEDILOL 3.12 MG/1
TABLET ORAL
Qty: 180 TABLET | Refills: 3 | Status: SHIPPED | OUTPATIENT
Start: 2019-07-08 | End: 2020-03-02 | Stop reason: SDUPTHER

## 2019-07-09 DIAGNOSIS — K21.9 GASTROESOPHAGEAL REFLUX DISEASE, ESOPHAGITIS PRESENCE NOT SPECIFIED: ICD-10-CM

## 2019-07-09 RX ORDER — OMEPRAZOLE 40 MG/1
40 CAPSULE, DELAYED RELEASE ORAL
Qty: 180 CAPSULE | Refills: 3 | Status: SHIPPED | OUTPATIENT
Start: 2019-07-09 | End: 2020-03-02 | Stop reason: SDUPTHER

## 2019-07-18 ENCOUNTER — TELEPHONE (OUTPATIENT)
Dept: INTERNAL MEDICINE | Facility: CLINIC | Age: 84
End: 2019-07-18

## 2019-07-18 DIAGNOSIS — I10 ESSENTIAL HYPERTENSION: Primary | ICD-10-CM

## 2019-07-18 RX ORDER — IRBESARTAN 150 MG/1
150 TABLET ORAL NIGHTLY
Qty: 90 TABLET | Refills: 3 | Status: SHIPPED | OUTPATIENT
Start: 2019-07-18 | End: 2020-03-02 | Stop reason: SDUPTHER

## 2019-07-18 NOTE — TELEPHONE ENCOUNTER
Spoke with spouse and advised her of pcp's message and she stated that the patients average BP reading has been 138/74.

## 2019-07-18 NOTE — TELEPHONE ENCOUNTER
Let them know I trevino end in an alternative for losartan 75mg (similar drug) BUT we need to get BP readings to assure that the BP is not too high, not too low in case we need to adjust med doses. Can she do home BPs twice a week and send them to us in a few weeks?    No need ot start the new medication till she runs out of losartan

## 2019-07-18 NOTE — TELEPHONE ENCOUNTER
Spoke with pharmacist and she informed me that Losartan is on a nationwide backorder for all strengths. Spouse stated that she is having a hard time getting the patient to agree to come to his doctor visits.    Spouse currently has a 2 week old bottle (almost new bottle), of the 25MG and pharmacist suggested that the patient take 3 of the 25MG tablets to equal 75MG. Spouse stated she is ok with giving the patient the 3 tablets but it will soon run out.     Please advise

## 2019-10-04 RX ORDER — BECLOMETHASONE DIPROPIONATE HFA 40 UG/1
AEROSOL, METERED RESPIRATORY (INHALATION)
Qty: 10.6 G | Refills: 3 | Status: SHIPPED | OUTPATIENT
Start: 2019-10-04 | End: 2020-06-02 | Stop reason: SDUPTHER

## 2019-12-06 DIAGNOSIS — E78.5 HYPERLIPIDEMIA, UNSPECIFIED HYPERLIPIDEMIA TYPE: ICD-10-CM

## 2019-12-09 ENCOUNTER — TELEPHONE (OUTPATIENT)
Dept: INTERNAL MEDICINE | Facility: CLINIC | Age: 84
End: 2019-12-09

## 2019-12-09 DIAGNOSIS — E78.5 HYPERLIPIDEMIA, UNSPECIFIED HYPERLIPIDEMIA TYPE: ICD-10-CM

## 2019-12-09 RX ORDER — ATORVASTATIN CALCIUM 40 MG/1
40 TABLET, FILM COATED ORAL NIGHTLY
Qty: 90 TABLET | Refills: 0 | Status: SHIPPED | OUTPATIENT
Start: 2019-12-09 | End: 2020-03-02

## 2019-12-09 RX ORDER — ATORVASTATIN CALCIUM 40 MG/1
40 TABLET, FILM COATED ORAL NIGHTLY
Qty: 90 TABLET | Refills: 3 | Status: SHIPPED | OUTPATIENT
Start: 2019-12-09 | End: 2019-12-09 | Stop reason: SDUPTHER

## 2019-12-09 NOTE — PROGRESS NOTES
Refill Authorization Note     is requesting a refill authorization.    Brief assessment and rationale for refill: APPROVE: needs labs     Medication-related problems identified: Requires labs    Medication Therapy Plan: NTBO(LIPID)                              Comments:   Requested Prescriptions   Pending Prescriptions Disp Refills    atorvastatin (LIPITOR) 40 MG tablet 90 tablet 0     Sig: Take 1 tablet (40 mg total) by mouth nightly.       Cardiovascular:  Antilipid - Statins Failed - 12/6/2019 12:47 PM        Failed - Lipid Panel completed in last 360 days     Lab Results   Component Value Date    CHOL 121 06/01/2018    HDL 40 06/01/2018    LDLCALC 61.8 (L) 06/01/2018    TRIG 96 06/01/2018             Passed - Patient is at least 18 years old        Passed - Office visit in past 12 months or future 90 days     Recent Outpatient Visits            10 months ago Annual physical exam    [facility] - Internal Medicine Jaxon Ramírez MD    10 months ago Hypertension, unspecified type    [facility] - Internal Medicine Penny Swartz, FNP-C    1 year ago Central retinal vein occlusion with macular edema of right eye    [place] - Ophthalmology ROSINA Colldao MD    1 year ago Nocturia    [facility] - Urology 4th Floor Fiona Aguirre MD    1 year ago Post-operative [facility]    [place] - Ophthalmology Jose Richmond MD                    Passed - ALT is 94 or below and within 360 days     ALT   Date Value Ref Range Status   02/10/2019 16 10 - 44 U/L Final   01/18/2019 15 10 - 44 U/L Final   06/01/2018 14 10 - 44 U/L Final              Passed - AST is 54 or below and within 360 days     AST   Date Value Ref Range Status   02/10/2019 18 10 - 40 U/L Final   01/18/2019 16 10 - 40 U/L Final   06/01/2018 17 10 - 40 U/L Final

## 2020-01-15 DIAGNOSIS — F41.9 ANXIETY: ICD-10-CM

## 2020-01-15 NOTE — TELEPHONE ENCOUNTER
Contact: Spouse/Clarisse [phone]  Had to reschedule appt for 03/02/2020 but will run out of Rx citalopram (CELEXA) 10 MG tablet. Wants to know if you will refill it in Feb. So they will have enough to last until the appt.    [facility] - [place], [place] - [address]    Please call and advise.    Thank You

## 2020-01-16 RX ORDER — CITALOPRAM 10 MG/1
10 TABLET ORAL DAILY
Qty: 30 TABLET | Refills: 11 | Status: SHIPPED | OUTPATIENT
Start: 2020-01-16 | End: 2020-03-02 | Stop reason: SDUPTHER

## 2020-01-17 DIAGNOSIS — J45.901 ASTHMA EXACERBATION: ICD-10-CM

## 2020-01-17 DIAGNOSIS — J45.40 ASTHMA IN ADULT, MODERATE PERSISTENT, UNCOMPLICATED: ICD-10-CM

## 2020-01-20 RX ORDER — ALBUTEROL SULFATE 90 UG/1
AEROSOL, METERED RESPIRATORY (INHALATION)
Qty: 3 INHALER | Refills: 0 | Status: SHIPPED | OUTPATIENT
Start: 2020-01-20 | End: 2021-02-08 | Stop reason: SDUPTHER

## 2020-01-20 NOTE — PROGRESS NOTES
Refill Authorization Note     is requesting a refill authorization.    Brief assessment and rationale for refill: APPROVE: prr          Medication Therapy Plan: BP elevated at recent visit; FOVS 2/20; Approve 3 more                              Comments:     Requested Prescriptions   Signed Prescriptions Disp Refills    albuterol (PROAIR HFA) 90 mcg/actuation inhaler 3 Inhaler 0     Sig: INHALE TWO PUFFS EVERY 6 HOURS AS NEEDED FOR WHEEZING       Pulmonology:  [facility] Agonists Failed - 1/17/2020 10:08 AM        Failed - Patient has asthma or COPD and there is a controller medication on the active medication list        Failed - Last BP in normal range within 360 days     BP Readings from Last 3 Encounters:   02/10/19 (!) 175/77   02/08/19 (!) 158/90   01/22/19 (!) 140/82              Failed - If patient with asthma requests more than 1 inhaler every 3 months, assess for uncontrolled symptoms and/or notify provider.        Passed - Patient is at least 18 years old        Passed - Last Heart Rate in normal range within 360 days     Pulse Readings from Last 3 Encounters:   02/10/19 74   02/08/19 68   01/22/19 68             Passed - Office visit in past 12 months or future 90 days     Recent Outpatient Visits            11 months ago Annual physical exam    [facility] Jaxon Ramírez MD    12 months ago Hypertension, unspecified type    [facility] - Internal Medicine Penny Swartz, FNP-C    1 year ago Central retinal vein occlusion with macular edema of right eye    [place] - Ophthalmology ROSINA Collado MD    1 year ago Nocturia    [facility] - Urology 4th Floor Fiona Aguirre MD    1 year ago Post-operative [facility]    [place] - Ophthalmology Jose Richmond MD          Future Appointments              In 1 month MD [facility], [facility]

## 2020-03-02 ENCOUNTER — OFFICE VISIT (OUTPATIENT)
Dept: INTERNAL MEDICINE | Facility: CLINIC | Age: 85
End: 2020-03-02
Payer: MEDICARE

## 2020-03-02 VITALS
BODY MASS INDEX: 29.08 KG/M2 | DIASTOLIC BLOOD PRESSURE: 70 MMHG | HEART RATE: 67 BPM | WEIGHT: 207.69 LBS | HEIGHT: 71 IN | SYSTOLIC BLOOD PRESSURE: 124 MMHG | OXYGEN SATURATION: 96 %

## 2020-03-02 DIAGNOSIS — I10 ESSENTIAL HYPERTENSION: ICD-10-CM

## 2020-03-02 DIAGNOSIS — I10 BENIGN ESSENTIAL HTN: ICD-10-CM

## 2020-03-02 DIAGNOSIS — K21.9 GASTROESOPHAGEAL REFLUX DISEASE, ESOPHAGITIS PRESENCE NOT SPECIFIED: ICD-10-CM

## 2020-03-02 DIAGNOSIS — I95.1 DELAYED ORTHOSTATIC HYPOTENSION: ICD-10-CM

## 2020-03-02 DIAGNOSIS — Z00.00 ANNUAL PHYSICAL EXAM: Primary | ICD-10-CM

## 2020-03-02 DIAGNOSIS — I50.22 CHRONIC SYSTOLIC CHF (CONGESTIVE HEART FAILURE): ICD-10-CM

## 2020-03-02 DIAGNOSIS — E78.5 HYPERLIPIDEMIA, UNSPECIFIED HYPERLIPIDEMIA TYPE: ICD-10-CM

## 2020-03-02 DIAGNOSIS — F41.9 ANXIETY: ICD-10-CM

## 2020-03-02 DIAGNOSIS — J30.89 ENVIRONMENTAL AND SEASONAL ALLERGIES: ICD-10-CM

## 2020-03-02 DIAGNOSIS — N40.0 BENIGN NON-NODULAR PROSTATIC HYPERPLASIA WITHOUT LOWER URINARY TRACT SYMPTOMS: ICD-10-CM

## 2020-03-02 DIAGNOSIS — G20.C PARKINSONISM, UNSPECIFIED PARKINSONISM TYPE: ICD-10-CM

## 2020-03-02 LAB
BACTERIA #/AREA URNS AUTO: NORMAL /HPF
BILIRUB UR QL STRIP: NEGATIVE
CLARITY UR REFRACT.AUTO: CLEAR
COLOR UR AUTO: YELLOW
GLUCOSE UR QL STRIP: NEGATIVE
HGB UR QL STRIP: NEGATIVE
KETONES UR QL STRIP: NEGATIVE
LEUKOCYTE ESTERASE UR QL STRIP: NEGATIVE
MICROSCOPIC COMMENT: NORMAL
NITRITE UR QL STRIP: NEGATIVE
PH UR STRIP: 5 [PH] (ref 5–8)
PROT UR QL STRIP: NEGATIVE
RBC #/AREA URNS AUTO: 1 /HPF (ref 0–4)
SP GR UR STRIP: 1.02 (ref 1–1.03)
SQUAMOUS #/AREA URNS AUTO: 1 /HPF
URN SPEC COLLECT METH UR: NORMAL
WBC #/AREA URNS AUTO: 3 /HPF (ref 0–5)

## 2020-03-02 PROCEDURE — 99214 OFFICE O/P EST MOD 30 MIN: CPT | Mod: PBBFAC | Performed by: FAMILY MEDICINE

## 2020-03-02 PROCEDURE — 99999 PR PBB SHADOW E&M-EST. PATIENT-LVL IV: CPT | Mod: PBBFAC,,, | Performed by: FAMILY MEDICINE

## 2020-03-02 PROCEDURE — 81001 URINALYSIS AUTO W/SCOPE: CPT

## 2020-03-02 PROCEDURE — 99397 PER PM REEVAL EST PAT 65+ YR: CPT | Mod: S$PBB,,, | Performed by: FAMILY MEDICINE

## 2020-03-02 PROCEDURE — 99397 PR PREVENTIVE VISIT,EST,65 & OVER: ICD-10-PCS | Mod: S$PBB,,, | Performed by: FAMILY MEDICINE

## 2020-03-02 PROCEDURE — 99999 PR PBB SHADOW E&M-EST. PATIENT-LVL IV: ICD-10-PCS | Mod: PBBFAC,,, | Performed by: FAMILY MEDICINE

## 2020-03-02 RX ORDER — OMEPRAZOLE 40 MG/1
40 CAPSULE, DELAYED RELEASE ORAL
Qty: 180 CAPSULE | Refills: 3 | Status: SHIPPED | OUTPATIENT
Start: 2020-03-02 | End: 2021-07-12

## 2020-03-02 RX ORDER — CITALOPRAM 20 MG/1
20 TABLET, FILM COATED ORAL DAILY
Qty: 90 TABLET | Refills: 3 | Status: SHIPPED | OUTPATIENT
Start: 2020-03-02 | End: 2021-07-26 | Stop reason: SDUPTHER

## 2020-03-02 RX ORDER — CARVEDILOL 3.12 MG/1
TABLET ORAL
Qty: 180 TABLET | Refills: 3 | Status: SHIPPED | OUTPATIENT
Start: 2020-03-02

## 2020-03-02 RX ORDER — ATORVASTATIN CALCIUM 20 MG/1
20 TABLET, FILM COATED ORAL NIGHTLY
Qty: 90 TABLET | Refills: 3 | Status: SHIPPED | OUTPATIENT
Start: 2020-03-02 | End: 2021-05-24

## 2020-03-02 RX ORDER — IRBESARTAN 150 MG/1
150 TABLET ORAL NIGHTLY
Qty: 90 TABLET | Refills: 3 | Status: SHIPPED | OUTPATIENT
Start: 2020-03-02 | End: 2021-03-02

## 2020-03-02 RX ORDER — DUTASTERIDE 0.5 MG/1
CAPSULE, LIQUID FILLED ORAL
Qty: 90 CAPSULE | Refills: 3 | Status: SHIPPED | OUTPATIENT
Start: 2020-03-02 | End: 2021-02-19 | Stop reason: SDUPTHER

## 2020-03-02 RX ORDER — FLUTICASONE PROPIONATE 50 MCG
1 SPRAY, SUSPENSION (ML) NASAL DAILY
Qty: 16 G | Refills: 3 | Status: SHIPPED | OUTPATIENT
Start: 2020-03-02 | End: 2020-11-30 | Stop reason: SDUPTHER

## 2020-03-02 NOTE — PROGRESS NOTES
Subjective:       Patient ID: Dennis Rees is a 85 y.o. male.    Chief Complaint: Annual Exam    Patient is here for follow-up of their chronic diseases, annual exam  Has not seen neurologist in a few years, needs to for Parkinson's  Anxiety a bit worse per wife, will increase celxa  Memory and dementia worsening    Review of Systems   Constitutional: Negative for chills and fatigue.   HENT: Negative for ear pain.    Eyes: Negative for pain.   Respiratory: Negative for chest tightness.    Cardiovascular: Negative for chest pain.   Gastrointestinal: Negative for abdominal pain.   Genitourinary: Negative for flank pain.   Musculoskeletal: Negative for gait problem.   Neurological: Negative for syncope.   Psychiatric/Behavioral: Positive for confusion. Negative for behavioral problems.       Objective:      Physical Exam   Constitutional: He appears well-developed.   HENT:   Head: Normocephalic and atraumatic.   Eyes: EOM are normal.   Neck: Neck supple.   Cardiovascular: Normal rate.   Pulmonary/Chest: Breath sounds normal. No respiratory distress. He has no wheezes. He has no rales.   Abdominal: Soft.   Musculoskeletal: He exhibits no edema.   Neurological:   Asked how long his father lived till, he says he is still alive. His fwife of 46 years corrected him saying he  of an MVA prior to their marraige   Skin: Skin is dry.   Psychiatric: His behavior is normal.       Assessment:       1. Annual physical exam    2. Benign essential HTN    3. Hyperlipidemia, unspecified hyperlipidemia type    4. Parkinsonism, unspecified Parkinsonism type    5. Chronic systolic CHF (congestive heart failure)    6. Gastroesophageal reflux disease, esophagitis presence not specified    7. Essential hypertension    8. Delayed orthostatic hypotension    9. Benign non-nodular prostatic hyperplasia without lower urinary tract symptoms    10. Anxiety    11. Environmental and seasonal allergies        Plan:       Dennis was seen today  "for annual exam.    Diagnoses and all orders for this visit:    Annual physical exam  -     CBC auto differential; Future  -     Lipid panel; Future  -     TSH; Future  -     Comprehensive metabolic panel; Future  -     Urinalysis, Reflex to Urine Culture Urine, Clean Catch    Benign essential HTN  /70 (BP Location: Left arm, Patient Position: Sitting, BP Method: Large (Manual))   Pulse 67   Ht 5' 11" (1.803 m)   Wt 94.2 kg (207 lb 10.8 oz)   SpO2 96%   BMI 28.96 kg/m²    Essential hypertension  -     irbesartan (AVAPRO) 150 MG tablet; Take 1 tablet (150 mg total) by mouth every evening.  -controlled, stable, no change in meds   -     CBC auto differential; Future  -     Lipid panel; Future  -     TSH; Future  -     Comprehensive metabolic panel; Future  -     Urinalysis, Reflex to Urine Culture Urine, Clean Catch    Hyperlipidemia, unspecified hyperlipidemia type  -     Lipid panel; Future  -     atorvastatin (LIPITOR) 20 MG tablet; Take 1 tablet (20 mg total) by mouth nightly. (had bene taking half of a 40mg, so this is no change)    Parkinsonism, unspecified Parkinsonism type  -     Ambulatory referral/consult to Neurology; Future    Chronic systolic CHF (congestive heart failure)  -stable, on coreg    Gastroesophageal reflux disease, esophagitis presence not specified  -     omeprazole (PRILOSEC) 40 MG capsule; Take 1 capsule (40 mg total) by mouth 2 (two) times daily before meals.    Delayed orthostatic hypotension  -     carvediloL (COREG) 3.125 MG tablet; Take 1 tablet (3.125 mg total) by mouth 2 (two) times daily with meals.    Benign non-nodular prostatic hyperplasia without lower urinary tract symptoms  -     dutasteride (AVODART) 0.5 mg capsule; Take 1 capsule (0.5 mg total) by mouth once daily.    Anxiety  -     citalopram (CELEXA) 20 MG tablet; Take 1 tablet (20 mg total) by mouth once daily. (increased to day form 10mg)    Environmental and seasonal allergies  -     fluticasone propionate " (FLONASE) 50 mcg/actuation nasal spray; 1 spray (50 mcg total) by Each Nostril route once daily.    Addendum:  Results review note to pt/family:  Very slight drift into underactive thyroid (subclinical hypothyroidism), but just barely over the border into it. Not enough to start a medication yet. Will recheck at next visit in a year, or could do so sooner in 3-6 months. I would ordinarily do the 3-6 months but I know he hates coming into the office even for just a blood test.  The MCV being high may mean he is not getting enough vitamin B-12/folate, so a supplement of these daily may be a good idea.

## 2020-03-02 NOTE — PATIENT INSTRUCTIONS
Wt Readings from Last 9 Encounters:   03/02/20 94.2 kg (207 lb 10.8 oz)   02/10/19 96 kg (211 lb 10.3 oz)   02/08/19 96.3 kg (212 lb 4.9 oz)   01/22/19 96.8 kg (213 lb 6.5 oz)   01/18/19 93 kg (205 lb)   09/28/18 95.7 kg (211 lb)   06/28/18 98.8 kg (217 lb 13 oz)   06/26/18 97.5 kg (215 lb)   06/12/18 97.5 kg (215 lb)

## 2020-03-03 PROBLEM — E03.8 SUBCLINICAL HYPOTHYROIDISM: Status: ACTIVE | Noted: 2020-03-03

## 2020-06-02 NOTE — TELEPHONE ENCOUNTER
----- Message from Chary Washington sent at 6/2/2020 12:07 PM CDT -----  Contact: [facility]istant [phone]  Type: Rx    Name of medication(s): QVAR REDIHALER 40 mcg/actuation HFAB    Is this a refill? New rx? refill    Who prescribed medication? Dr Ramírez    Pharmacy Name, Phone, & Location: [facility], [place] - [address] [place] [phone]

## 2020-06-05 ENCOUNTER — TELEPHONE (OUTPATIENT)
Dept: INTERNAL MEDICINE | Facility: CLINIC | Age: 85
End: 2020-06-05

## 2020-06-05 DIAGNOSIS — J45.30 MILD PERSISTENT ASTHMA IN ADULT WITHOUT COMPLICATION: Primary | ICD-10-CM

## 2020-06-05 NOTE — TELEPHONE ENCOUNTER
QVAR inhaler not covered by his insurance with low likelihood of PA approval    Epic recommends using budesonide instead.  WIll send to [facility] to assure PA is taken care of.  Plan 90d supply 3 RF  Once approve pt can transfer script elsewhere if desired

## 2020-06-15 ENCOUNTER — TELEPHONE (OUTPATIENT)
Dept: NEUROLOGY | Facility: CLINIC | Age: 85
End: 2020-06-15

## 2020-06-15 NOTE — TELEPHONE ENCOUNTER
----- Message from Lin Hahn sent at 6/15/2020  4:07 PM CDT -----  Name of Who is Calling: Clarisse (spouse)    What is the request in detail: Would like to speak with staff in regards to possibly rescheduling 6/22 appointment. Please contact to further discuss and advise      Can the clinic reply by [facility]: no    What Number to Call Back [facility]: [phone]                   [phone]

## 2020-06-16 ENCOUNTER — TELEPHONE (OUTPATIENT)
Dept: NEUROLOGY | Facility: CLINIC | Age: 85
End: 2020-06-16

## 2020-07-28 ENCOUNTER — TELEPHONE (OUTPATIENT)
Dept: NEUROLOGY | Facility: CLINIC | Age: 85
End: 2020-07-28

## 2020-07-28 NOTE — TELEPHONE ENCOUNTER
"Spoke with Mrs. wellington she was informed there have been changes to Dr. Archibald schedule friday morning are in person appt, the afternoon is now for virtual appts. Mrs. Wellington verbalized understanding stating "okay we can convert the appt". Instructions for virtual appt given via telephone.   "

## 2020-08-03 ENCOUNTER — TELEPHONE (OUTPATIENT)
Dept: NEUROLOGY | Facility: CLINIC | Age: 85
End: 2020-08-03

## 2020-08-03 NOTE — TELEPHONE ENCOUNTER
----- Message from Mati Bender sent at 8/3/2020  1:34 PM CDT -----  Contact: Mrs. Holliday (wife) @ [phone]  Mrs. Holliday is asking to speak w/ Gely regarding appt on 08/07/20

## 2020-08-06 ENCOUNTER — PATIENT OUTREACH (OUTPATIENT)
Dept: ADMINISTRATIVE | Facility: OTHER | Age: 85
End: 2020-08-06

## 2020-08-07 ENCOUNTER — OFFICE VISIT (OUTPATIENT)
Dept: NEUROLOGY | Facility: CLINIC | Age: 85
End: 2020-08-07
Payer: MEDICARE

## 2020-08-07 DIAGNOSIS — G20.C PARKINSONISM, UNSPECIFIED PARKINSONISM TYPE: ICD-10-CM

## 2020-08-07 DIAGNOSIS — F03.90 DEMENTIA WITHOUT BEHAVIORAL DISTURBANCE, UNSPECIFIED DEMENTIA TYPE: ICD-10-CM

## 2020-08-07 PROCEDURE — 99205 PR OFFICE/OUTPT VISIT, NEW, LEVL V, 60-74 MIN: ICD-10-PCS | Mod: 95,,, | Performed by: PSYCHIATRY & NEUROLOGY

## 2020-08-07 PROCEDURE — 99205 OFFICE O/P NEW HI 60 MIN: CPT | Mod: 95,,, | Performed by: PSYCHIATRY & NEUROLOGY

## 2020-08-07 RX ORDER — CARBIDOPA AND LEVODOPA 25; 100 MG/1; MG/1
1 TABLET ORAL 3 TIMES DAILY
Qty: 90 TABLET | Refills: 11 | Status: SHIPPED | OUTPATIENT
Start: 2020-08-07 | End: 2021-08-07

## 2020-08-07 NOTE — ASSESSMENT & PLAN NOTE
Dementia within the context of PDism. Will examine to screen for PD-plus symptoms. Some fam hx of dementia to suggest genetic component. 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  After PDism addressed, may start aricept.

## 2020-08-07 NOTE — PROGRESS NOTES
"The patient location is: home  The chief complaint leading to consultation is: Pdism  Visit type: audiovisual  Total time spent with patient: 40mins  Each patient to whom he or she provides medical services by telemedicine is:  (1) informed of the relationship between the physician and patient and the respective role of any other health care provider with respect to management of the patient; and (2) notified that he or she may decline to receive medical services by telemedicine and may withdraw from such care at any time.    Notes: below    MOVEMENT DISORDERS CLINIC NEW VIDEO CONSULT NOTE    PCP/Referring Provider: Jaxon Ramírez MD  [address] [place] [address]  Date of Service: 8/7/2020    Chief Complaint: Pdism    HPI: Dennis Rees is a L HANDED 85 y.o. male with a medical issues significant for CHF, HTN, CAD, kidney dz, hypothyroidism, coming for PD eval. Notes since before 2016 he noted a L hand tremor, which progressed. Within a year he started having a slowed shuffling gait. Has required a walker on and off. Family helps him walk around as he "doesn't know how to use" the walker. Can walk 10 feet by himself moderate shuffle. Several falls in the last year. Note he saw Patricia Zamorano who suggested carbidopa/levodopa 25/100mg 1 tab PO TID. He refused and has not been on medications for PD.    Family reports lately he's has progressive paucity of speech and less interactive. Memory is progressively worse. Most of the day he remains sedentary and minimally interactive.   Lost all ADLs -fully dependent on family  No hallucinations  Mood swings once a week    No fam hx PD  Sisters with dementia in  70's    PD Review of Symptoms:  Cognitive slowing: see above  Orthostasis: at times  Falls: 3  Freezing: yes  -RBD: yes    Review of Systems:   Review of Systems   Constitutional: Negative for fever.   HENT: Negative for congestion.    Eyes: Negative for double vision.   Respiratory: Negative for " cough and shortness of breath.    Cardiovascular: Negative for chest pain and leg swelling.   Gastrointestinal: Negative for nausea.   Genitourinary: Negative for dysuria.   Musculoskeletal: Positive for falls.   Skin: Negative for rash.   Neurological: Positive for tremors and speech change. Negative for headaches.   Psychiatric/Behavioral: Positive for memory loss. Negative for depression and hallucinations.         Current Medications:  Outpatient Encounter Medications as of 8/7/2020   Medication Sig Dispense Refill    albuterol (ACCUNEB) 0.63 mg/3 mL Nebu Take 3 mLs (0.63 mg total) by nebulization every 6 (six) hours as needed. Rescue 90 mL 3    albuterol (PROAIR HFA) 90 mcg/actuation inhaler INHALE TWO PUFFS EVERY 6 HOURS AS NEEDED FOR WHEEZING 3 Inhaler 0    alclomethasone (ACLOVATE) 0.05 % cream AAA on penis BID as needed for redness. May use up to a week straight at a time. 45 g 1    aspirin (ECOTRIN) 81 MG EC tablet Take 81 mg by mouth nightly.       atorvastatin (LIPITOR) 20 MG tablet Take 1 tablet (20 mg total) by mouth nightly. 90 tablet 3    beclomethasone dipropionate 40 mcg/actuation HFAB Inhale 1 puff into the lungs 2 (two) times a day. 10.6 g 3    carbidopa-levodopa  mg (SINEMET)  mg per tablet Take 1 tablet by mouth 3 (three) times daily. 90 tablet 11    carvediloL (COREG) 3.125 MG tablet Take 1 tablet (3.125 mg total) by mouth 2 (two) times daily with meals. 180 tablet 3    cetirizine (ZYRTEC) 10 MG tablet Take 10 mg by mouth every morning.       ciclopirox (PENLAC) 8 % Soln Apply daily to affected nail. Must remove with rubbing alcohol once weekly and restart 1 Bottle 5    citalopram (CELEXA) 20 MG tablet Take 1 tablet (20 mg total) by mouth once daily. 90 tablet 3    dutasteride (AVODART) 0.5 mg capsule Take 1 capsule (0.5 mg total) by mouth once daily. 90 capsule 3    fluticasone propionate (FLONASE) 50 mcg/actuation nasal spray 1 spray (50 mcg total) by Each Nostril  route once daily. 16 g 3    FLUZONE QUAD 7462-2427 60 mcg (15 mcg x 4)/0.5 mL Susp INJECT AS DIRECTED  0    irbesartan (AVAPRO) 150 MG tablet Take 1 tablet (150 mg total) by mouth every evening. 90 tablet 3    omeprazole (PRILOSEC) 40 MG capsule Take 1 capsule (40 mg total) by mouth 2 (two) times daily before meals. 180 capsule 3    ondansetron (ZOFRAN) 4 MG tablet Take 1 tablet (4 mg total) by mouth every 6 (six) hours. 12 tablet 0     No facility-administered encounter medications on file as of 8/7/2020.        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)    Asthma in adult    GERD (gastroesophageal reflux disease)    Cystoid macular edema, right eye    Delayed orthostatic hypotension    Orthostatic lightheadedness    Hypertensive retinopathy of both eyes    Central retinal vein occlusion with macular edema of right eye    NS (nuclear sclerosis), right    Refractive error    Senile nuclear sclerosis    Nocturia    Parkinsonism    Anxiety    Subclinical hypothyroidism    Dementia       Past Surgical History:  Past Surgical History:   Procedure Laterality Date    CARDIAC CATHETERIZATION  12/1/11    non obstructive    CATARACT EXTRACTION W/  INTRAOCULAR LENS IMPLANT Left 06/12/2018    Dr. Richmond    CIRCUMCISION, PRIMARY      HERNIA REPAIR      INTRAOCULAR PROSTHESES INSERTION Left 6/12/2018    Procedure: INSERTION, INTRAOCULAR LENS;  Surgeon: Jose Richmond MD;  Location: [facility] OR [address] [place] [address];  Service: Ophthalmology;  Laterality: Left;    INTRAOCULAR PROSTHESES INSERTION Right 6/26/2018    Procedure: INSERTION, INTRAOCULAR LENS PROSTHESIS;  Surgeon: Jose Richmond MD;  Location: [facility] OR [address] [place] [address];  Service:  Ophthalmology;  Laterality: Right;    PHACOEMULSIFICATION OF CATARACT Left 6/12/2018    Procedure: PHACOEMULSIFICATION, CATARACT;  Surgeon: Jose Richmond MD;  Location: [address];  Service: Ophthalmology;  Laterality: Left;    PHACOEMULSIFICATION OF CATARACT Right 6/26/2018    Procedure: PHACOEMULSIFICATION, CATARACT;  Surgeon: Jose Richmond MD;  Location: [address];  Service: Ophthalmology;  Laterality: Right;    TONSILLECTOMY, ADENOIDECTOMY      TOTAL KNEE ARTHROPLASTY         Current Living Situation: home    Social:  Social History     Socioeconomic History    Marital status:      Spouse name: Not on file    Number of children: Not on file    Years of education: Not on file    Highest education level: Not on file   Occupational History     Employer: OTHER   Social Needs    Financial resource strain: Not on file    Food insecurity     Worry: Not on file     Inability: Not on file    Transportation needs     Medical: Not on file     Non-medical: Not on file   Tobacco Use    Smoking status: Never Smoker    Smokeless tobacco: Never Used   Substance and Sexual Activity    Alcohol use: No    Drug use: No    Sexual activity: Not on file   Lifestyle    Physical activity     Days per week: Not on file     Minutes per session: Not on file    Stress: Not on file   Relationships    Social connections     Talks on phone: Not on file     Gets together: Not on file     Attends Alevism service: Not on file     Active member of club or organization: Not on file     Attends meetings of clubs or organizations: Not on file     Relationship status: Not on file   Other Topics Concern    Not on file   Social History Narrative    Not on file       Family History:  Family History   Problem Relation Age of Onset    Stroke Mother     Diabetes Maternal Aunt     No Known Problems Father     No Known Problems Sister     No Known Problems Brother     No Known Problems Maternal  Uncle     No Known Problems Paternal Aunt     No Known Problems Paternal Uncle     No Known Problems Maternal Grandmother     No Known Problems Maternal Grandfather     No Known Problems Paternal Grandmother     No Known Problems Paternal Grandfather     No Known Problems Daughter     No Known Problems Son     Melanoma Neg Hx     Amblyopia Neg Hx     Blindness Neg Hx     Cancer Neg Hx     Cataracts Neg Hx     Glaucoma Neg Hx     Hypertension Neg Hx     Macular degeneration Neg Hx     Retinal detachment Neg Hx     Strabismus Neg Hx     Thyroid disease Neg Hx     Tremor Neg Hx     Parkinsonism Neg Hx        PHYSICAL:  There were no vitals taken for this visit.    Physical Exam  Constitutional: Well-developed, well-nourished, appears stated age  Eyes: No scleral icterus  ENT: Moist oral mucosa  Cardiovascular: No lower extremity edema   Respiratory: No labored breathing   Skin: No rash   Hematologic: No bruising  Other: GI/ deferred   · Mental status: Vacant stare, minimally responsive to video camera- no speech spontaneously    · Cranial nerves:            · CN II: Pupils mid-position and equal, not tested light or accommodation  · CN III, IV, VI: Extraocular movements full, no nystagmus visualized  · CN V: Not tested   · CN VII: Face strong and symmetric bilaterally   · CN VIII: Hearing intact to voice and conversation   · CN IX, X: Palate raises midline and symmetric   · CN XI: Strong shoulder shrug B/L  · CN XII: Tongue appears midline   · Motor: Normal bulk by appearance, no drift   · Sensory: Not tested    · Gait: Not tested  · Deep tendon reflexes: Not tested  · Movement/Coordination                    No hypophonic speech.                     No facial masking.  No bradykinesia.                   No tremor with rest, posture, kinesis, or intention.                     No other dystonia, chorea, athetosis, myoclonus, or tics visualized.    Bradykinesia  ? Finger taps Finger flicks TERA  Heel taps   Left 2+ - - -   Right 3+ - - -       No tremor visualized    Laboratory Data:  NA    Imaging:  NA    Assessment//Plan:   Problem List Items Addressed This Visit        Neuro    Parkinsonism    Current Assessment & Plan     Profound PDism, untreated since at least 2016, now with dementia. Unclear if Dementia with PDism or PDD. Would suggeste an inperson visit as he is minimally interactive on video visit, and difficult to assess.  Suggested slow start and 2 weeks increase to carbidopa/levodopa 25/100mg 1 tab PO TID. TO stop if nausea, hallucinations, or dizziness.         Dementia    Current Assessment & Plan     Dementia within the context of PDism. Will examine to screen for PD-plus symptoms. Some fam hx of dementia to suggest genetic component. 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  After PDism addressed, may start aricept.                 Roya Archibald MD, MS [facility]  Department of Neurology  Movement Disorders

## 2020-08-07 NOTE — ASSESSMENT & PLAN NOTE
Profound PDism, untreated since at least 2016, now with dementia. Unclear if Dementia with PDism or PDD. Would suggeste an inperson visit as he is minimally interactive on video visit, and difficult to assess.  Suggested slow start and 2 weeks increase to carbidopa/levodopa 25/100mg 1 tab PO TID. TO stop if nausea, hallucinations, or dizziness.

## 2020-08-07 NOTE — LETTER
August 7, 2020      Jaxon Ramírez MD  [address]  [facility] [address]           [facility] - Neurology  [address]  [facility] [address]  Phone: [phone]  Fax: [phone]          Patient: Dennis Rees   MR Number: [id number]   YOB: 1935   Date of Visit: 8/7/2020       Dear Dr. Jaxon Ramírez:    Thank you for referring Dennis Rees to me for evaluation. Attached you will find relevant portions of my assessment and plan of care.    If you have questions, please do not hesitate to call me. I look forward to following Dennis Rees along with you.    Sincerely,    Roya Archibald MD    Enclosure  CC:  No Recipients    If you would like to receive this communication electronically, please contact [email or web address] or [phone] to request more information on [facility] access.    For providers and/or their staff who would like to refer a patient to [facility], please contact us through our one-stop-shop provider referral line, [facility], at [phone].    If you feel you have received this communication in error or would no longer like to receive these types of communications, please e-mail [email or web address]

## 2020-08-13 ENCOUNTER — TELEPHONE (OUTPATIENT)
Dept: NEUROLOGY | Facility: CLINIC | Age: 85
End: 2020-08-13

## 2020-08-13 NOTE — TELEPHONE ENCOUNTER
----- Message from Jailyn Quick sent at 8/13/2020  3:49 PM CDT -----  Regarding: ADVICE  Contact: Wife - Zaida  WIFE - ZAIDA MACARIO pt is having some side affects from the medication that was just prescribe ask for a call      Contact info  [phone] (home)

## 2020-08-13 NOTE — TELEPHONE ENCOUNTER
"Spoke with Mrs. Rees, she states "she could not get Mr. Rees to take Carbidopa-Levodopa  mg morning dosage, Mrs. Rees she had to crush medication up and put it apple sauce for the Mr. Rees to take the medication, the past few days and this morning Mr. Rees has been agitated, pushing and shoving while Mrs. Rees cleaned him, wants to know can Carbidopa-Levodopa cause this reaction". Mrs. Rees informed a message will be forward to Dr. Archibald for review. Mrs. Rees verbalized understanding.  "

## 2020-08-14 ENCOUNTER — TELEPHONE (OUTPATIENT)
Dept: NEUROLOGY | Facility: CLINIC | Age: 85
End: 2020-08-14

## 2020-08-14 RX ORDER — DONEPEZIL HYDROCHLORIDE 5 MG/1
10 TABLET, FILM COATED ORAL NIGHTLY
Qty: 60 TABLET | Refills: 11 | Status: SHIPPED | OUTPATIENT
Start: 2020-08-14 | End: 2021-08-11 | Stop reason: SDUPTHER

## 2020-08-14 NOTE — TELEPHONE ENCOUNTER
Talked to wife who notes he seemed aggravated and combative after carbidopa/levodopa 25/100mg 1/2 tabs    Suggested Aricept 5mg QHS for 1 week  Next 10mg QHS for 1 week    Next restart carbidopa/levodopa 25/100mg 1 tab PO TID

## 2020-08-18 ENCOUNTER — TELEPHONE (OUTPATIENT)
Dept: NEUROLOGY | Facility: CLINIC | Age: 85
End: 2020-08-18

## 2020-08-18 ENCOUNTER — TELEPHONE (OUTPATIENT)
Dept: INTERNAL MEDICINE | Facility: CLINIC | Age: 85
End: 2020-08-18

## 2020-08-18 DIAGNOSIS — F03.90 DEMENTIA WITHOUT BEHAVIORAL DISTURBANCE, UNSPECIFIED DEMENTIA TYPE: ICD-10-CM

## 2020-08-18 DIAGNOSIS — G20.C PARKINSONISM, UNSPECIFIED PARKINSONISM TYPE: Primary | ICD-10-CM

## 2020-08-18 NOTE — TELEPHONE ENCOUNTER
"Spoke with Mrs. Rees, she states "Mr. Rees had an agitation reaction to the Carbidopa-Levodopa  mg last week, medication was discontinued for now,  Mr. Rees was given Donepezil 5 mg, advised by Dr. Archibald to take 10 mg for one week, after the first week increase to 20 mg twice a day for two weeks continuing the Donepezil 50 mg along with Carbidopa-Leveodopa  mg, Mrs. Rees informed by the pharmacist going up on the dosage will cut the pills from in half for the month, needing to clarified the directions". Mrs. Rees informed a message will be forward to Dr. Archibald for review. Mrs. Rees verbalized understanding  "

## 2020-08-18 NOTE — TELEPHONE ENCOUNTER
----- Message from Debra Toribio sent at 8/18/2020 10:09 AM CDT -----  Contact: Clarisse (wife) @ [phone]  Pts wife has questions concerning pts new prescription for donepeziL (ARICEPT) 5 MG tablet.  Pls call to clarify.

## 2020-08-18 NOTE — TELEPHONE ENCOUNTER
Wife is having problems managing her  at home. She states that he has dementia and Parkinson's disease. It has been very difficult to mange her  at home. He fights with her to get bathed and to take his medication. She is asking to have Home health to possibly evaluate and treat and obtain resources to mange him at home.

## 2020-08-19 ENCOUNTER — TELEPHONE (OUTPATIENT)
Dept: UROLOGY | Facility: CLINIC | Age: 85
End: 2020-08-19

## 2020-08-19 NOTE — TELEPHONE ENCOUNTER
----- Message from Mya Hamilton RN sent at 8/18/2020  7:41 PM CDT -----  Regarding: FW: Plan of care  Contact: [phone]    ----- Message -----  From: Mary Song  Sent: 8/18/2020   9:46 AM CDT  To: [facility] Staff  Subject: Plan of care                                     Calling in to speak with nurse regarding plan of care and how to proceed with care. Patient requesting orders for clean collect urine for a possible UTI. Please call and advise

## 2020-08-31 ENCOUNTER — OFFICE VISIT (OUTPATIENT)
Dept: UROLOGY | Facility: CLINIC | Age: 85
End: 2020-08-31
Payer: MEDICARE

## 2020-08-31 VITALS — SYSTOLIC BLOOD PRESSURE: 129 MMHG | DIASTOLIC BLOOD PRESSURE: 74 MMHG | HEART RATE: 75 BPM

## 2020-08-31 DIAGNOSIS — G20.A1 PARKINSON'S DISEASE: ICD-10-CM

## 2020-08-31 DIAGNOSIS — N39.41 URGE INCONTINENCE: Primary | ICD-10-CM

## 2020-08-31 DIAGNOSIS — F02.818 DEMENTIA ASSOCIATED WITH OTHER UNDERLYING DISEASE WITH BEHAVIORAL DISTURBANCE: ICD-10-CM

## 2020-08-31 PROCEDURE — 99213 OFFICE O/P EST LOW 20 MIN: CPT | Mod: PBBFAC | Performed by: UROLOGY

## 2020-08-31 PROCEDURE — 99214 OFFICE O/P EST MOD 30 MIN: CPT | Mod: S$PBB,,, | Performed by: UROLOGY

## 2020-08-31 PROCEDURE — 99999 PR PBB SHADOW E&M-EST. PATIENT-LVL III: ICD-10-PCS | Mod: PBBFAC,,, | Performed by: UROLOGY

## 2020-08-31 PROCEDURE — 51798 US URINE CAPACITY MEASURE: CPT | Mod: PBBFAC | Performed by: UROLOGY

## 2020-08-31 PROCEDURE — 99214 PR OFFICE/OUTPT VISIT, EST, LEVL IV, 30-39 MIN: ICD-10-PCS | Mod: S$PBB,,, | Performed by: UROLOGY

## 2020-08-31 PROCEDURE — 99999 PR PBB SHADOW E&M-EST. PATIENT-LVL III: CPT | Mod: PBBFAC,,, | Performed by: UROLOGY

## 2020-08-31 NOTE — PROGRESS NOTES
CHIEF COMPLAINT:    Mr. Rees is a 85 y.o. male presenting for a follow up visit for LUTS in a patient with history of Parkinson's disease.    PRESENTING ILLNESS:    Dennis Rees is a 85 y.o. male who returns accompanied by his wife.  She passed me a note before the appointment indicating that he has had a marked change in his behavior.  In the past, she has been able to help clean him and assist him with toileting and now he resists help from her.  He is not as verbal as he used to be and the shuffling gait of Parkinson's disease is much more pronounced.  They were unable to get home health but it is clear from her note that she needs help in bathing him and managing his daily needs, and would benefit from respite care.  He has become resistant to going to doctor's appointments.  He is presently on dutesteride which she has to put in apple sauce or pudding as he dislikes taking his meds.  (and it cannot be crushed)  He uses a pull up and it generally gets changed 2-3 times a day and is soaked.      REVIEW OF SYSTEMS:    Review of Systems   Unable to perform ROS: Patient nonverbal (patient is minimally verbal)       PATIENT HISTORY:    Past Medical History:   Diagnosis Date    ALLERGIC RHINITIS     Asthma in adult     BPH (benign prostatic hypertrophy)     Cataract     Chronic systolic CHF (congestive heart failure) 10/11    resolved    Clostridium difficile colitis     Coronary artery disease 12/1/11    non obstructive    GERD (gastroesophageal reflux disease)     Heart attack     HTN (hypertension)     Hyperlipidemia     Hypertensive retinopathy of both eyes 3/5/2015    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 nose and back (NMSC), excised  by     Squamous cell carcinoma 11/2014    right mid cheek, MOHS    Squamous cell carcinoma 11/2014    left mid cheek, MOHS       Past Surgical History:   Procedure Laterality Date    CARDIAC CATHETERIZATION  12/1/11    non obstructive    CATARACT EXTRACTION W/  INTRAOCULAR LENS IMPLANT Left 06/12/2018    Dr. Richmond    CIRCUMCISION, PRIMARY      HERNIA REPAIR      INTRAOCULAR PROSTHESES INSERTION Left 6/12/2018    Procedure: INSERTION, INTRAOCULAR LENS;  Surgeon: Jose Richmond MD;  Location: [facility] [address] [place] [address];  Service: Ophthalmology;  Laterality: Left;    INTRAOCULAR PROSTHESES INSERTION Right 6/26/2018    Procedure: INSERTION, INTRAOCULAR LENS PROSTHESIS;  Surgeon: Jose Richmond MD;  Location: [facility] [address] [place] [address];  Service: Ophthalmology;  Laterality: Right;    PHACOEMULSIFICATION OF CATARACT Left 6/12/2018    Procedure: PHACOEMULSIFICATION, CATARACT;  Surgeon: Jose Richmond MD;  Location: [facility] [address] [place] [address];  Service: Ophthalmology;  Laterality: Left;    PHACOEMULSIFICATION OF CATARACT Right 6/26/2018    Procedure: PHACOEMULSIFICATION, CATARACT;  Surgeon: Jose Richmond MD;  Location: [facility] [address] [place] [address];  Service: Ophthalmology;  Laterality: Right;    TONSILLECTOMY, ADENOIDECTOMY      TOTAL KNEE ARTHROPLASTY         Family History   Problem Relation Age of Onset    Stroke Mother     Diabetes Maternal Aunt      Socioeconomic History    Marital status:    Tobacco Use    Smoking status: Never Smoker    Smokeless tobacco: Never Used   Substance and Sexual Activity    Alcohol use: No    Drug use: No    Sexual activity: Not on file       Allergies:  Pcn [penicillins] and Sulfa (sulfonamide antibiotics)    Medications:  Outpatient Encounter Medications as of 8/31/2020   Medication Sig Dispense Refill    albuterol (ACCUNEB) 0.63 mg/3 mL Nebu Take 3 mLs (0.63 mg total) by nebulization every 6 (six) hours as needed. Rescue 90 mL 3    albuterol (PROAIR HFA) 90  mcg/actuation inhaler INHALE TWO PUFFS EVERY 6 HOURS AS NEEDED FOR WHEEZING 3 Inhaler 0    alclomethasone (ACLOVATE) 0.05 % cream AAA on penis BID as needed for redness. May use up to a week straight at a time. 45 g 1    aspirin (ECOTRIN) 81 MG EC tablet Take 81 mg by mouth nightly.       atorvastatin (LIPITOR) 20 MG tablet Take 1 tablet (20 mg total) by mouth nightly. 90 tablet 3    beclomethasone dipropionate 40 mcg/actuation HFAB Inhale 1 puff into the lungs 2 (two) times a day. 10.6 g 3    carvediloL (COREG) 3.125 MG tablet Take 1 tablet (3.125 mg total) by mouth 2 (two) times daily with meals. 180 tablet 3    cetirizine (ZYRTEC) 10 MG tablet Take 10 mg by mouth every morning.       ciclopirox (PENLAC) 8 % Soln Apply daily to affected nail. Must remove with rubbing alcohol once weekly and restart 1 Bottle 5    citalopram (CELEXA) 20 MG tablet Take 1 tablet (20 mg total) by mouth once daily. 90 tablet 3    donepeziL (ARICEPT) 5 MG tablet Take 2 tablets (10 mg total) by mouth every evening. 60 tablet 11    dutasteride (AVODART) 0.5 mg capsule Take 1 capsule (0.5 mg total) by mouth once daily. 90 capsule 3    fluticasone propionate (FLONASE) 50 mcg/actuation nasal spray 1 spray (50 mcg total) by Each Nostril route once daily. 16 g 3    irbesartan (AVAPRO) 150 MG tablet Take 1 tablet (150 mg total) by mouth every evening. 90 tablet 3    omeprazole (PRILOSEC) 40 MG capsule Take 1 capsule (40 mg total) by mouth 2 (two) times daily before meals. 180 capsule 3    carbidopa-levodopa  mg (SINEMET)  mg per tablet Take 1 tablet by mouth 3 (three) times daily. (Patient not taking: Reported on 8/31/2020) 90 tablet 11    FLUZONE QUAD 5313-2175 60 mcg (15 mcg x 4)/0.5 mL Susp INJECT AS DIRECTED  0    [DISCONTINUED] ondansetron (ZOFRAN) 4 MG tablet Take 1 tablet (4 mg total) by mouth every 6 (six) hours. 12 tablet 0     No facility-administered encounter medications on file as of 8/31/2020.           PHYSICAL EXAMINATION:    The patient generally appears in good health, is minmally interactive, and is in no apparent distress.    Skin:  Around the genitalia is erythematous but it is not macerated.     Mental: minimally responsive verbally, awake, stares     Neuro: his gait has become markedly abnormal with the shuffling gait of Parkinsons disease    HEENT: Normal. No evidence of lymphadenopathy.    Chest:  normal inspiratory effort.    Abdomen: Soft, non-tender. No masses or organomegaly. Bladder is not palpable. No evidence of flank discomfort. No evidence of inguinal hernia.    Extremities: No clubbing, cyanosis, or edema    Scrotum showed no rashes or lesions. Testicles showed no masses or tenderness.  Epididymis showed no masses or tenderness.  Penis was circumcised. No meatal stenosis. No penile discharge.  No inguinal hernias.  No inguinal lymphadenopathy.    REGINO:  40 g prostate without nodularity.  Normal rectal tone, no anal masses.   Random bladder volume is 368 ml by bladder scan    LABS:    Lab Results   Component Value Date    BUN 14 05/29/2020    CREATININE 1.0 05/29/2020     Lab Results   Component Value Date    PSA 1.4 05/20/2015    PSA 1.2 05/09/2014    PSA 1.29 03/20/2013         IMPRESSION:    Encounter Diagnoses   Name Primary?    Urge incontinence Yes    Parkinson's disease     Dementia associated with other underlying disease with behavioral disturbance        PLAN:    1.  Recommended using mike's butt paste to protect the skin  2.  He has had a noteable change in his behavior, and it may be appropriate for him to be on hospice.  His wife certainly would benefit from respite services.  Messaged Dr. Ramírez and Dr. Archibald.  3.  dutesteride cannot be crushed as it is irritating to the skin.  Finasteride is also not supposed to be crushed.  Recommended pudding or apple sauce.   4.  Continue dutesteride.

## 2020-09-01 ENCOUNTER — TELEPHONE (OUTPATIENT)
Dept: NEUROLOGY | Facility: CLINIC | Age: 85
End: 2020-09-01

## 2020-09-01 ENCOUNTER — TELEPHONE (OUTPATIENT)
Dept: INTERNAL MEDICINE | Facility: CLINIC | Age: 85
End: 2020-09-01

## 2020-09-01 DIAGNOSIS — G20.A1 PARKINSON'S DISEASE: ICD-10-CM

## 2020-09-01 DIAGNOSIS — F02.818 DEMENTIA ASSOCIATED WITH OTHER UNDERLYING DISEASE WITH BEHAVIORAL DISTURBANCE: Primary | ICD-10-CM

## 2020-09-01 NOTE — TELEPHONE ENCOUNTER
----- Message from Fiona Aguirre MD sent at 8/31/2020  5:58 PM CDT -----  Dear Dr.s Ramírez and Cristela,    I saw Mr. Rees today and it has been 2 years since I last saw him in the office.  I was surprised to see the degree of decline. His wife passed me a note and outlined how difficult it is to manage him at home because he has become quite uncooperative when it comes to accepting aid for his hygiene and for complying with 's visits.  Apparently she tried to get home health services but was told by one of the agencies he did not meet criteria.     I was wondering about Hospice for him since he carries the diagnosis of dementia with his Parkinsons.  I informed her that he would likely need an in office visit to better assess his needs and I would leave it up to either of you for this.  It is clear that she needs the help and would benefit from respite services.     It is heart breaking to see Mr. Rees's decline.      Fiona Aguirre MD

## 2020-09-01 NOTE — TELEPHONE ENCOUNTER
----- Message from Debra Toribio sent at 9/1/2020  1:53 PM CDT -----  Contact: Clarisse (wife) @ [phone]  Calling with an update on pts condition.  Pls call.

## 2020-09-01 NOTE — TELEPHONE ENCOUNTER
"Spoke with Mrs. Rees she called to give an update, she states "Mr. Rees started the 3rd week of Donepezil 5 mg today, still agitated, pushing and elbowing, Mr. Rees sometimes calm down when his son is there, can be aggressive, to start back Carbidopa-Levodopa next week, can be enrage sometimes, strictly with care, especially at night". Mrs. Rees informed a message will be forward to Dr. Archibald for review.   "

## 2020-09-01 NOTE — TELEPHONE ENCOUNTER
Please call pt's wife.  Let her know I heard from Dr. Aguirre and she wondered if hospice might be appropriate. I believe he would qualify for it under the Parkinson's Dz, and it would provide some home assistance, though I am not certain how much. If you are interested, I can place a hospice referral,a dn then the hospice nurse will contact you and come for a visit to further evaluate. If you have a specific hospice service you prefer, let me know. Otherwise will place the referral to one of the local hospice services.   sharri      ===  Fiona,  Thanks for your suggestion. I'll reach out to Ms Rees and off the hospice alternative. If they are willing, I'll put in the referral for it.  Jesus  ===View-only below this line===  ----- Message -----  From: Fiona Aguirre MD  Sent: 8/31/2020   5:58 PM CDT  To: Jaxon Ramírez MD, Roya Archibald MD    Dear Dr.s Ramírez and Cristela,    I saw Mr. Rees today and it has been 2 years since I last saw him in the office.  I was surprised to see the degree of decline. His wife passed me a note and outlined how difficult it is to manage him at home because he has become quite uncooperative when it comes to accepting aid for his hygiene and for complying with 's visits.  Apparently she tried to get home health services but was told by one of the agencies he did not meet criteria.     I was wondering about Hospice for him since he carries the diagnosis of dementia with his Parkinsons.  I informed her that he would likely need an in office visit to better assess his needs and I would leave it up to either of you for this.  It is clear that she needs the help and would benefit from respite services.     It is heart breaking to see Mr. Rees's decline.      Fiona Aguirre MD

## 2020-09-01 NOTE — TELEPHONE ENCOUNTER
----- Message from Debra Toribio sent at 9/1/2020  2:36 PM CDT -----  Contact: Clarisse (wife) @ [phone]  Pt says she is returning Gely's call.      [address] 
[phone] Patient: Leslie Aranda MRN: [id number] You are allergic to the following No active allergies Recent Documentation Smoking Status Former Smoker Emergency Contacts Name Discharge Info Relation Home Work Mobile Susie Harrison  Daughter [21] [phone] About your hospitalization You were admitted on:  June 22, 2017 You last received care in the:  [facility] You were discharged on:  June 22, 2017 Unit phone number:  [phone] Why you were hospitalized Your primary diagnosis was:  Not on File Providers Seen During Your Hospitalizations Provider Role Specialty Primary office phone Manju Farmer MD Attending Provider Gastroenterology [phone] Your Primary Care Physician (PCP) Primary Care Physician Office Phone Office Fax Stephy Henriquez [address] [place] [address] Follow-up Information None Current Discharge Medication List  
  
ASK your doctor about these medications Dose & Instructions Dispensing Information Comments Morning Noon Evening Bedtime  
 aspirin delayed-release 81 mg tablet Your last dose was: Your next dose is:    
   
   
 Dose:  81 mg Take 1 Tab by mouth daily. Refills:  0 COREG 12.5 mg tablet Generic drug:  carvedilol Your last dose was: Your next dose is:    
   
   
 Dose:  12.5 mg Take 12.5 mg by mouth two (2) times daily (with meals). Refills:  0 HYDROcodone-acetaminophen  mg tablet Commonly known as:  Hung Vicco Your last dose was: Your next dose is:    
   
   
 Dose:  1 Tab Take 1 Tab by mouth every eight (8) hours as needed for Pain. Max Daily Amount: 3 Tabs. Quantity:  90 Tab Refills:  0 lisinopril 20 mg tablet Commonly known as:  Etta Feil Your last dose was: Your next dose is:    
   
   
 Dose:  10 mg Take 10 mg by mouth two (2) times a day. 20 mg tablet- Pt states that he takes one half tablet twice daily Refills:  0  
     
   
   
   
  
 nitroglycerin 0.4 mg SL tablet Commonly known as:  NITROSTAT Your last dose was: Your next dose is:    
   
   
 Dose:  0.4 mg  
1 Tab by SubLINGual route every five (5) minutes as needed for Chest Pain. Quantity:  2 Bottle Refills:  4  
     
   
   
   
  
 pravastatin 20 mg tablet Commonly known as:  PRAVACHOL Your last dose was: Your next dose is:    
   
   
 Dose:  20 mg Take 1 Tab by mouth nightly. Quantity:  30 Tab Refills:  11 Discharge Instructions Gastrointestinal Esophagogastroduodenoscopy (EGD) - Upper Exam Discharge Instructions 1. Call Dr. Novoa Degree at [phone] for any problems or questions. 2. Contact the doctor's office for follow up appointment as directed. 3. Medication may cause drowsiness for several hours, therefore, do not drive or                  operate machinery for remainder of the day. 4. No alcohol today. 5. Ordinarily, you may resume regular diet and activity after exam unless otherwise              specified by your physician. 6. For mild soreness in your throat you may use Cepacol throat lozenges or warm               salt-water gargles as needed. Any additional instructions: Instructions given to Radha Martha and other family members. Instructions given by:  Derrick Councilman, RN Gastrointestinal Colonoscopy/Flexible Sigmoidoscopy - Lower Exam Discharge Instructions 1. Because of air put into your colon during exam, you may experience some abdominal distension, relieved by the passage of gas, for several hours. 2. Contact your physician if you have any of the following: a. Excessive amount of bleeding  large amount when having a bowel movement. Occasional specks of blood with bowel movement would not be unusual. 
b. Severe abdominal pain 
c. Fever or Chills Any additional instructions:  Repeat colon in 5 years Instructions given to Dani Mensah and other family members. Instructions given by:  Britta Reddy RN Discharge Orders None Introducing [facility] SERVICES! Peg Hemp introduces Reeher patient portal. Now you can access parts of your medical record, email your doctor's office, and request medication refills online. 1. In your internet browser, go to [email or web address] 2. Click on the First Time User? Click Here link in the Sign In box. You will see the New Member Sign Up page. 3. Enter your Reeher Access Code exactly as it appears below. You will not need to use this code after youve completed the sign-up process. If you do not sign up before the expiration date, you must request a new code. · Reeher Access Code: [email or web address] Expires: 9/10/2017 12:26 PM 
 
4. Enter the last four digits of your Social Security Number (xxxx) and Date of Birth (mm/dd/yyyy) as indicated and click Submit. You will be taken to the next sign-up page. 5. Create a Reeher ID. This will be your Reeher login ID and cannot be changed, so think of one that is secure and easy to remember. 6. Create a Reeher password. You can change your password at any time. 7. Enter your Password Reset Question and Answer. This can be used at a later time if you forget your password. 8. Enter your e-mail address. You will receive e-mail notification when new information is available in [address] 9. Click Sign Up. You can now view and download portions of your medical record. 10. Click the Download Summary menu link to download a portable copy of your medical information.  
 
If you have questions, please visit the Frequently Asked Questions section of the [employer]. Remember, MyChart is NOT to be used for urgent needs. For medical emergencies, dial 911. Now available from your iPhone and Android! General Information Please provide this summary of care documentation to your next provider. Patient Signature:  ____________________________________________________________ Date:  ____________________________________________________________  
  
Sadie Brought Provider Signature:  ____________________________________________________________ Date:  ____________________________________________________________

## 2020-09-02 ENCOUNTER — TELEPHONE (OUTPATIENT)
Dept: INTERNAL MEDICINE | Facility: CLINIC | Age: 85
End: 2020-09-02

## 2020-09-02 NOTE — ADDENDUM NOTE
Addended by: JOSIE PARK on: 9/2/2020 01:58 PM     Modules accepted: Orders    
Patient states no history

## 2020-09-02 NOTE — TELEPHONE ENCOUNTER
Pt's wife was contacted and notified of the message from the PCP. Pt's wife seemed very relieved just hearing there might be some assistance available to them. She definitely wants the Hospice service.

## 2020-09-02 NOTE — TELEPHONE ENCOUNTER
----- Message from Zoie Renner sent at 9/2/2020  4:06 PM CDT -----  Contact: [facility] [phone]  Spouse is requesting hospice care for her . Would need a consult order, lab results, and demographic sheet. Please fax to [phone].    Please call and advise.    Thank You

## 2020-09-02 NOTE — TELEPHONE ENCOUNTER
Please call [facility] and give them my verbal order for hospice.   Ask them what they would like to get form you over the phone (e.g., wife's phone #) and what they want faxed over. My hospice order is in Epic, you can find it by search for hospice and then print it out. I will not be back till the 11th so you may need to have my coverage sign for me to get it faxed over there. They often want the last few notes from me, and perhaps also urology and neurology    Normally they will send out a hospice nurse for intake evaluation and determination of services needs.   They usually ask who do I want to do the ongoing  physician orders for hospice, myself or the hospice medical director - the latter is OK with me

## 2020-09-02 NOTE — TELEPHONE ENCOUNTER
Yokasta,  See my prior note about patrick lafleur et al  sharri    ===  ----- Message from Zoie Renner sent at 9/2/2020  4:06 PM CDT -----  Contact: [facility] [phone]  Spouse is requesting hospice care for her . Would need a consult order, lab results, and demographic sheet. Please fax to [phone].     Please call and advise.     Thank You

## 2020-09-03 RX ORDER — QUETIAPINE FUMARATE 25 MG/1
25 TABLET, FILM COATED ORAL NIGHTLY
Qty: 30 TABLET | Refills: 11 | Status: SHIPPED | OUTPATIENT
Start: 2020-09-03 | End: 2021-09-03

## 2020-09-03 NOTE — TELEPHONE ENCOUNTER
Called to re-iterate instructions  Aricept 5mg for 1 week  Aricept 10mg for 1 week  Then start carbidopa/levodopa 25/100mg 1/2 tab PO TID     Would also suggest in addition of seroquel 25mg QHS

## 2020-09-03 NOTE — TELEPHONE ENCOUNTER
----- Message from Zoie Renner sent at 9/3/2020 11:51 AM CDT -----  Contact: [facility] [phone]  Spouse is requesting hospice care for her . Would need a consult order, lab results, and demographic sheet. Please fax to [phone]. Please resend all the needed documents asap today. States that they didn't receive it.    Please call and advise.    Thank You

## 2020-09-03 NOTE — TELEPHONE ENCOUNTER
"Spoke with Mrs. Rees, she was informed per Dr. Archibald to give Mr. Rees 10 mg of Donepezil before starting Carbidopa-Levodopa. Mrs. Rees verbalized understanding stating "in previous instructions she were to do different dosages of Donepezil in 3 weeks and hold Carbidopa-Levodopa, she will like clarification on new instructions versus the old instructions". Mrs. Rees informed a message will be forward to Dr. Archibald for review.   "

## 2020-09-04 ENCOUNTER — TELEPHONE (OUTPATIENT)
Dept: INTERNAL MEDICINE | Facility: CLINIC | Age: 85
End: 2020-09-04

## 2020-09-04 NOTE — TELEPHONE ENCOUNTER
----- Message from Debra Villegas sent at 9/4/2020  1:55 PM CDT -----  Regarding: CTI  Contact: Lundsha Donohueirene Cartwright [phone]  CTI - was faxed Last night.  Checking on status of form

## 2020-09-09 ENCOUNTER — TELEPHONE (OUTPATIENT)
Dept: INTERNAL MEDICINE | Facility: CLINIC | Age: 85
End: 2020-09-09

## 2020-09-09 NOTE — TELEPHONE ENCOUNTER
----- Message from Amber Galdamez sent at 9/9/2020 10:47 AM CDT -----  Contact: Adria Cartwright [phone]  Chay was calling to confirm status on form. Please advise

## 2020-09-10 NOTE — TELEPHONE ENCOUNTER
Called for Chay in response to request for a form. I spoke to Hospice staff and was told he maybe looking for a (CTI) Certificate of illness. I will check the area and get back to them.

## 2020-09-11 ENCOUNTER — TELEPHONE (OUTPATIENT)
Dept: ADMINISTRATIVE | Facility: OTHER | Age: 85
End: 2020-09-11

## 2020-09-11 NOTE — TELEPHONE ENCOUNTER
The admit specialist with [facility] reports that pt is on their service. Pt's wife, Clarisse, confirms that pt is receiving hospice services and reports that a [facility] nurse is currently at her home.

## 2020-10-01 ENCOUNTER — PES CALL (OUTPATIENT)
Dept: ADMINISTRATIVE | Facility: CLINIC | Age: 85
End: 2020-10-01

## 2020-11-09 ENCOUNTER — PATIENT OUTREACH (OUTPATIENT)
Dept: ADMINISTRATIVE | Facility: OTHER | Age: 85
End: 2020-11-09

## 2020-11-30 DIAGNOSIS — J30.89 ENVIRONMENTAL AND SEASONAL ALLERGIES: ICD-10-CM

## 2020-11-30 RX ORDER — FLUTICASONE PROPIONATE 50 MCG
1 SPRAY, SUSPENSION (ML) NASAL DAILY
Qty: 48 G | Refills: 0 | Status: SHIPPED | OUTPATIENT
Start: 2020-11-30

## 2020-11-30 NOTE — PROGRESS NOTES
Refill Authorization Note   Dennis Rees is requesting a refill authorization.  Brief assessment and rationale for refill: Approve     Medication Therapy Plan: CDMR; Labs due 3/21; Defer care gaps to future encounter    Medication reconciliation completed: No   Comments:   Orders Placed This Encounter    fluticasone propionate (FLONASE) 50 mcg/actuation nasal spray      Requested Prescriptions   Signed Prescriptions Disp Refills    fluticasone propionate (FLONASE) 50 mcg/actuation nasal spray 48 g 0     Si spray (50 mcg total) by Each Nostril route once daily.       Ear, Nose, and Throat: Nasal Preparations - Corticosteroids Passed - 2020 11:11 AM        Passed - Patient is at least 18 years old        Passed - Office visit in past 12 months or future 90 days     Recent Outpatient Visits            3 months ago Urge incontinence    [facility] [address] Fiona Aguirre MD    3 months ago Parkinsonism, unspecified Parkinsonism type    [facility] [address] Roya Archibald MD    9 months ago Annual physical exam    [facility] Jaxon Ramírez MD    1 year ago Annual physical exam    [facility] Jaxon Ramírez MD    1 year ago Hypertension, unspecified type    [facility] NANCY RondonP-C                        Appointments  past 12m or future 3m with PCP    Date Provider   Last Visit   3/2/2020 Jaxon Ramírez MD   Next Visit   Visit date not found Jaxon Ramírez MD   ED visits in past 90 days: 0     Note composed:4:01 PM 2020

## 2020-11-30 NOTE — TELEPHONE ENCOUNTER
Care Due:                  Date            Visit Type   Department     Provider  --------------------------------------------------------------------------------                                PHYSICAL -                              ESTABLISHED   [facility] INTERNAL  Last Visit: 03-      PATIENT      MEDICINE       JOSIE PARK  Next Visit: None Scheduled  None         None Found                                                            Last  Test          Frequency    Reason                     Performed    Due Date  --------------------------------------------------------------------------------    Office Visit  12 months..  atorvastatin, citalopram,   03- 02-                             dutasteride, irbesartan,                             omeprazole...............    Lipid Panel.  12 months..  atorvastatin.............  03- 02-    Powered by UpMo. Reference number: [id number]. 11/30/2020 11:11:53 AM   CST

## 2020-11-30 NOTE — TELEPHONE ENCOUNTER
----- Message from Laverne Hagen sent at 11/30/2020 10:41 AM CST -----  Regarding: Pt [id number]  Requesting an RX refill or new RX.  Is this a refill or new RX: Refill  RX name and strength: fluticasone propionate (FLONASE) 50 mcg/actuation nasal spray  Is this a 30 day or 90 day RX:   Pharmacy name and phone # [facility] - [place], [place] - [address] [phone] (Phone) [phone] (Fax)      Comments:

## 2021-01-22 ENCOUNTER — PATIENT MESSAGE (OUTPATIENT)
Dept: ADMINISTRATIVE | Facility: OTHER | Age: 86
End: 2021-01-22

## 2021-02-08 DIAGNOSIS — J45.901 ASTHMA EXACERBATION: ICD-10-CM

## 2021-02-08 DIAGNOSIS — J45.40 ASTHMA IN ADULT, MODERATE PERSISTENT, UNCOMPLICATED: ICD-10-CM

## 2021-02-08 DIAGNOSIS — R06.2 WHEEZING: ICD-10-CM

## 2021-02-08 RX ORDER — ALBUTEROL SULFATE 90 UG/1
AEROSOL, METERED RESPIRATORY (INHALATION)
Qty: 54 G | Refills: 3 | Status: SHIPPED | OUTPATIENT
Start: 2021-02-08

## 2021-02-10 ENCOUNTER — PES CALL (OUTPATIENT)
Dept: ADMINISTRATIVE | Facility: CLINIC | Age: 86
End: 2021-02-10

## 2021-02-10 ENCOUNTER — IMMUNIZATION (OUTPATIENT)
Dept: PRIMARY CARE CLINIC | Facility: CLINIC | Age: 86
End: 2021-02-10
Payer: MEDICARE

## 2021-02-10 DIAGNOSIS — Z23 NEED FOR VACCINATION: Primary | ICD-10-CM

## 2021-02-10 PROCEDURE — 91300 PR SARS-COV- 2 COVID-19 VACCINE, NO PRSV, 30MCG/0.3ML, IM: CPT | Mod: PBBFAC | Performed by: INTERNAL MEDICINE

## 2021-02-10 PROCEDURE — 0001A PR IMMUNIZ ADMIN, SARS-COV-2 COVID-19 VACC, 30MCG/0.3ML, 1ST DOSE: CPT | Mod: PBBFAC | Performed by: INTERNAL MEDICINE

## 2021-02-10 RX ADMIN — RNA INGREDIENT BNT-162B2 0.3 ML: 0.23 INJECTION, SUSPENSION INTRAMUSCULAR at 04:02

## 2021-02-12 ENCOUNTER — TELEPHONE (OUTPATIENT)
Dept: INTERNAL MEDICINE | Facility: CLINIC | Age: 86
End: 2021-02-12

## 2021-02-19 DIAGNOSIS — N40.0 BENIGN NON-NODULAR PROSTATIC HYPERPLASIA WITHOUT LOWER URINARY TRACT SYMPTOMS: ICD-10-CM

## 2021-02-19 RX ORDER — DUTASTERIDE 0.5 MG/1
CAPSULE, LIQUID FILLED ORAL
Qty: 90 CAPSULE | Refills: 3 | Status: CANCELLED | OUTPATIENT
Start: 2021-02-19

## 2021-02-19 RX ORDER — DUTASTERIDE 0.5 MG/1
CAPSULE, LIQUID FILLED ORAL
Qty: 90 CAPSULE | Refills: 1 | Status: SHIPPED | OUTPATIENT
Start: 2021-02-19 | End: 2021-08-10 | Stop reason: SDUPTHER

## 2021-02-22 RX ORDER — DUTASTERIDE 0.5 MG/1
CAPSULE, LIQUID FILLED ORAL
Qty: 90 CAPSULE | Refills: 3 | OUTPATIENT
Start: 2021-02-22

## 2021-03-03 ENCOUNTER — IMMUNIZATION (OUTPATIENT)
Dept: PRIMARY CARE CLINIC | Facility: CLINIC | Age: 86
End: 2021-03-03
Payer: MEDICARE

## 2021-03-03 DIAGNOSIS — Z23 NEED FOR VACCINATION: Primary | ICD-10-CM

## 2021-03-03 PROCEDURE — 91300 PR SARS-COV- 2 COVID-19 VACCINE, NO PRSV, 30MCG/0.3ML, IM: CPT | Mod: S$GLB,,, | Performed by: INTERNAL MEDICINE

## 2021-03-03 PROCEDURE — 0002A PR IMMUNIZ ADMIN, SARS-COV-2 COVID-19 VACC, 30MCG/0.3ML, 2ND DOSE: ICD-10-PCS | Mod: CV19,S$GLB,, | Performed by: INTERNAL MEDICINE

## 2021-03-03 PROCEDURE — 0002A PR IMMUNIZ ADMIN, SARS-COV-2 COVID-19 VACC, 30MCG/0.3ML, 2ND DOSE: CPT | Mod: CV19,S$GLB,, | Performed by: INTERNAL MEDICINE

## 2021-03-03 PROCEDURE — 91300 PR SARS-COV- 2 COVID-19 VACCINE, NO PRSV, 30MCG/0.3ML, IM: ICD-10-PCS | Mod: S$GLB,,, | Performed by: INTERNAL MEDICINE

## 2021-03-03 RX ADMIN — Medication 0.3 ML: at 04:03

## 2021-04-05 ENCOUNTER — PATIENT MESSAGE (OUTPATIENT)
Dept: ADMINISTRATIVE | Facility: HOSPITAL | Age: 86
End: 2021-04-05

## 2021-05-20 DIAGNOSIS — E78.5 HYPERLIPIDEMIA, UNSPECIFIED HYPERLIPIDEMIA TYPE: ICD-10-CM

## 2021-05-24 RX ORDER — ATORVASTATIN CALCIUM 20 MG/1
20 TABLET, FILM COATED ORAL NIGHTLY
Qty: 90 TABLET | Refills: 0 | Status: SHIPPED | OUTPATIENT
Start: 2021-05-24

## 2021-07-06 ENCOUNTER — PATIENT MESSAGE (OUTPATIENT)
Dept: ADMINISTRATIVE | Facility: HOSPITAL | Age: 86
End: 2021-07-06

## 2021-07-26 DIAGNOSIS — F41.9 ANXIETY: ICD-10-CM

## 2021-07-26 RX ORDER — CITALOPRAM 20 MG/1
20 TABLET, FILM COATED ORAL DAILY
Qty: 90 TABLET | Refills: 3 | Status: SHIPPED | OUTPATIENT
Start: 2021-07-26 | End: 2022-07-26

## 2021-07-28 RX ORDER — CITALOPRAM 20 MG/1
TABLET, FILM COATED ORAL
Qty: 90 TABLET | Refills: 3 | OUTPATIENT
Start: 2021-07-28

## 2021-08-05 ENCOUNTER — PATIENT OUTREACH (OUTPATIENT)
Dept: ADMINISTRATIVE | Facility: HOSPITAL | Age: 86
End: 2021-08-05

## 2021-08-10 DIAGNOSIS — N40.0 BENIGN NON-NODULAR PROSTATIC HYPERPLASIA WITHOUT LOWER URINARY TRACT SYMPTOMS: ICD-10-CM

## 2021-08-11 ENCOUNTER — TELEPHONE (OUTPATIENT)
Dept: UROLOGY | Facility: CLINIC | Age: 86
End: 2021-08-11

## 2021-08-11 DIAGNOSIS — F03.90 DEMENTIA WITHOUT BEHAVIORAL DISTURBANCE, UNSPECIFIED DEMENTIA TYPE: Primary | ICD-10-CM

## 2021-08-11 RX ORDER — DONEPEZIL HYDROCHLORIDE 10 MG/1
10 TABLET, FILM COATED ORAL NIGHTLY
Qty: 90 TABLET | Refills: 3 | Status: SHIPPED | OUTPATIENT
Start: 2021-08-11 | End: 2022-08-11

## 2021-08-11 RX ORDER — DUTASTERIDE 0.5 MG/1
CAPSULE, LIQUID FILLED ORAL
Qty: 90 CAPSULE | Refills: 0 | Status: SHIPPED | OUTPATIENT
Start: 2021-08-11

## 2021-08-23 ENCOUNTER — TELEPHONE (OUTPATIENT)
Dept: INTERNAL MEDICINE | Facility: CLINIC | Age: 86
End: 2021-08-23

## 2021-10-04 ENCOUNTER — PATIENT MESSAGE (OUTPATIENT)
Dept: ADMINISTRATIVE | Facility: HOSPITAL | Age: 86
End: 2021-10-04

## 2022-02-21 ENCOUNTER — PATIENT MESSAGE (OUTPATIENT)
Dept: NEUROLOGY | Facility: CLINIC | Age: 87
End: 2022-02-21
Payer: MEDICARE

## 2022-03-16 ENCOUNTER — PATIENT MESSAGE (OUTPATIENT)
Dept: ADMINISTRATIVE | Facility: HOSPITAL | Age: 87
End: 2022-03-16
Payer: MEDICARE

## 2022-08-04 ENCOUNTER — PATIENT OUTREACH (OUTPATIENT)
Dept: ADMINISTRATIVE | Facility: HOSPITAL | Age: 87
End: 2022-08-04
Payer: MEDICARE

## 2022-08-04 NOTE — PROGRESS NOTES
Health Maintenance Due   Topic Date Due    Shingles Vaccine (1 of 2) 01/02/2014    TETANUS VACCINE  03/22/2016    COVID-19 Vaccine (3 - Booster for Pfizer series) 08/03/2021     Chart review completed.

## 2023-04-20 NOTE — TELEPHONE ENCOUNTER
----- Message from Gonzalo Marquez sent at 6/15/2020  4:37 PM CDT -----  Contact: Clarisse (spouse ) @ [phone]  Caller returning a missed call, pls return     
Spoke with Mrs. Rees, she rescheduled Mr. Rees appt to 8/7/2020 at 2:20.    
No

## (undated) DEVICE — SOLUTION BSS PLUS

## (undated) DEVICE — SOL BETADINE 5%

## (undated) DEVICE — GARTER EYE ADULT

## (undated) DEVICE — DRAPE STERI 32 X 50

## (undated) DEVICE — HYDRODISSECTOR NUCLEUS 27GX7/8

## (undated) DEVICE — SOL WATER STRL IRR 1000ML

## (undated) DEVICE — GOWN SURGICAL X-LARGE

## (undated) DEVICE — NDL FLTR 5MCRN BLNT TIP 18GX1

## (undated) DEVICE — KIT GREY EYE

## (undated) DEVICE — Device

## (undated) DEVICE — SHEILD & GARTERS FOX METAL EYE

## (undated) DEVICE — STRIP MG FML-GLO .06 - ORDER F

## (undated) DEVICE — SHILED EYE METAL W/COVER WHITE